# Patient Record
Sex: FEMALE | Race: WHITE | NOT HISPANIC OR LATINO | Employment: UNEMPLOYED | URBAN - METROPOLITAN AREA
[De-identification: names, ages, dates, MRNs, and addresses within clinical notes are randomized per-mention and may not be internally consistent; named-entity substitution may affect disease eponyms.]

---

## 2017-06-19 ENCOUNTER — TRANSFERRED RECORDS (OUTPATIENT)
Dept: HEALTH INFORMATION MANAGEMENT | Facility: CLINIC | Age: 52
End: 2017-06-19

## 2018-09-13 ENCOUNTER — TRANSFERRED RECORDS (OUTPATIENT)
Dept: HEALTH INFORMATION MANAGEMENT | Facility: CLINIC | Age: 53
End: 2018-09-13

## 2018-09-19 ENCOUNTER — TRANSFERRED RECORDS (OUTPATIENT)
Dept: HEALTH INFORMATION MANAGEMENT | Facility: CLINIC | Age: 53
End: 2018-09-19

## 2018-09-24 ENCOUNTER — TRANSFERRED RECORDS (OUTPATIENT)
Dept: HEALTH INFORMATION MANAGEMENT | Facility: CLINIC | Age: 53
End: 2018-09-24

## 2018-09-25 NOTE — TELEPHONE ENCOUNTER
Date of appointment: 18 @ 11:00AM   Diagnosis/reason for appointment: Invasive Ductal Carcinoma, Dane Grade 1, ER/IL+  Referring provider/facility: Self Referred  Who called: Patient / OK'd per ashok from Dr. Oviedo    Recent Studies  Imagin14 Mammo, 3/1/16 Mammo, 3/3/16 Dx & Stereo Mammo, 17 Mammo, 18 Mammo, 18  Breast @ [facility] Imaging Dept  Pathology: 18 - [facility]  Labs: 18 ER/IL, HER2 FISH    Records requested from: [facility] Pathology Dept  Records received from:

## 2018-09-26 NOTE — TELEPHONE ENCOUNTER
6 images pushed to PACS from [employer] and is now resolved/viewable per Marty @ [facility] imaging dept.

## 2018-09-26 NOTE — TELEPHONE ENCOUNTER
Med recs received from [facility] (23 pages) and sent to scanning via [employer] @ 4:14PM

## 2018-09-26 NOTE — TELEPHONE ENCOUNTER
Imaging reports received from [facility] (8 pages) and sent to scanning via [employer] @ 7:54AM

## 2018-09-27 NOTE — TELEPHONE ENCOUNTER
Path slides and report received from [facility] and sent to [facility] path dept.

## 2018-09-28 ENCOUNTER — ONCOLOGY VISIT (OUTPATIENT)
Dept: ONCOLOGY | Facility: CLINIC | Age: 53
End: 2018-09-28
Attending: SURGERY
Payer: COMMERCIAL

## 2018-09-28 ENCOUNTER — TRANSFERRED RECORDS (OUTPATIENT)
Dept: HEALTH INFORMATION MANAGEMENT | Facility: CLINIC | Age: 53
End: 2018-09-28

## 2018-09-28 ENCOUNTER — PRE VISIT (OUTPATIENT)
Dept: ONCOLOGY | Facility: CLINIC | Age: 53
End: 2018-09-28

## 2018-09-28 VITALS
OXYGEN SATURATION: 98 % | HEART RATE: 93 BPM | HEIGHT: 64 IN | BODY MASS INDEX: 29.19 KG/M2 | DIASTOLIC BLOOD PRESSURE: 80 MMHG | TEMPERATURE: 97.4 F | SYSTOLIC BLOOD PRESSURE: 120 MMHG | WEIGHT: 171 LBS | RESPIRATION RATE: 16 BRPM

## 2018-09-28 DIAGNOSIS — C50.411 MALIGNANT NEOPLASM OF UPPER-OUTER QUADRANT OF RIGHT BREAST IN FEMALE, ESTROGEN RECEPTOR POSITIVE (H): Primary | ICD-10-CM

## 2018-09-28 DIAGNOSIS — Z17.0 MALIGNANT NEOPLASM OF UPPER-OUTER QUADRANT OF RIGHT BREAST IN FEMALE, ESTROGEN RECEPTOR POSITIVE (H): Primary | ICD-10-CM

## 2018-09-28 RX ORDER — LEVOTHYROXINE SODIUM 100 UG/1
100 TABLET ORAL EVERY MORNING
COMMUNITY
Start: 2018-08-10 | End: 2022-10-24

## 2018-09-28 RX ORDER — FLUTICASONE PROPIONATE 50 MCG
SPRAY, SUSPENSION (ML) NASAL
COMMUNITY
Start: 2015-11-02

## 2018-09-28 RX ORDER — IBUPROFEN 200 MG
200 TABLET ORAL
COMMUNITY

## 2018-09-28 RX ORDER — ALBUTEROL SULFATE 90 UG/1
2-4 AEROSOL, METERED RESPIRATORY (INHALATION)
COMMUNITY
Start: 2017-04-07

## 2018-09-28 RX ORDER — DIPHENHYDRAMINE HCL 25 MG
TABLET ORAL
COMMUNITY
End: 2019-05-01

## 2018-09-28 RX ORDER — HYDROXYZINE HYDROCHLORIDE 10 MG/5ML
4 SYRUP ORAL
COMMUNITY

## 2018-09-28 ASSESSMENT — PAIN SCALES - GENERAL: PAINLEVEL: NO PAIN (0)

## 2018-09-28 NOTE — LETTER
2018      RE: Gabriella Villarreal  [address]  [place] [address]     Sep 28, 2018    MIKAL CHAPIN FREDRICK   [facility] [address]  [place] [facility] [id number]    RE: Gabriella Villarreal  (: 1965)    Dear Dr. Mikal CHAPIN Fredrick:    Your patient was seen for evaluation in my office.  Please find a copy of my notes for your record and review.  If you have any further questions, please feel free to contact my office.   Thank you for your kind referral.    Sincerely,   Kalina Oviedo MD MSc formerly [facility] FACS    ---     NEW CONSULTATION  Sep 28, 2018    Gabriella Villarreal is a 53 year old woman who presents with a right  breast complaint.  She was self-referred.    HPI:    She noted no masses in either breast, axilla, or neck. She denies any nipple discharge or nipple inversion.    Imaging showed a 1.2 x 1.2 x 0.8 cm mass at 9:00 in the RIGHT breast.    A biopsy was performed and a clip was placed.  It showed invasive ductal carcinoma, grade 2, ER+ WI+ HER2/sirena equivocal.      She has a bilateral breast MRI ordered that will take place this evening.    BREAST-SPECIFIC HISTORY:  Prior breast biopsies: Yes, left breast in 2016 - benign  Prior breast surgeries: No  Prior radiation history: No  Hormone replacement therapy: No  Bra size: 36D  Dominant hand: Left    GYN HISTORY:    Age at 1st pregnancy: 31  Age at menarche: 14  Breastfeeding history: Yes  Menopausal? Perimenopausal - currently on OCP; L oophorectomy for benign cyst ()    FAMILY HISTORY:  Breast ca: No  Ovarian ca: Yes, paternal cousin (dx 35), maternal aunt (dx 64)  Pancreatic ca: No  Gastric ca: No  Melanoma: No  Colon ca: No  Other cancer: Yes, MGF w lung ca (smoker)    Past Medical History:   Diagnosis Date     Chronic cough      Hypothyroidism        Past Surgical History:   Procedure Laterality Date     CHOLECYSTECTOMY       L salpingooophorectomy       No GA issues    Current Outpatient Prescriptions   Medication Sig Dispense Refill      "albuterol (PROAIR HFA/PROVENTIL HFA/VENTOLIN HFA) 108 (90 Base) MCG/ACT inhaler Inhale 2-4 puffs into the lungs       fluticasone (FLONASE) 50 MCG/ACT spray INSTILL 2 SPRAYS INTO BOTH NOSTRILS DAILY.       chlorpheniramine (CHLOR-TRIMETON) 4 MG tablet        diphenhydrAMINE (BENADRYL) 25 MG tablet        ibuprofen (ADVIL/MOTRIN) 200 MG tablet Take 200 mg by mouth       levothyroxine (SYNTHROID/LEVOTHROID) 100 MCG tablet            No Known Allergies     SOCIAL HISTORY:  Smokes: No  EtOH: Yes, 2 drinks per week  Illicit drugs: No    She works as a  for [employer].    ROS:  Back pain: No  Headache: Yes - mild attributes to stress, not nocturnal, spontaneously resolves  Abdominal pain: No  Unexpected weight loss: No  Easy bruising/bleeding: No    /80 (BP Location: Right arm, Patient Position: Chair, Cuff Size: Adult Large)  Pulse 93  Temp 97.4  F (36.3  C) (Oral)  Resp 16  Ht 1.626 m (5' 4\")  Wt 77.6 kg (171 lb)  SpO2 98%  BMI 29.35 kg/m2   Physical Exam   Constitutional: She is well-developed, well-nourished, and in no distress.   Pulmonary/Chest: Effort normal. No respiratory distress. Right breast exhibits no inverted nipple, no mass, no nipple discharge, no skin change and no tenderness. Left breast exhibits no inverted nipple, no mass, no nipple discharge, no skin change and no tenderness.       Patient was examined in both upright and supine positions.   Lymphadenopathy:     She has no cervical adenopathy.        Right cervical: No superficial cervical, no deep cervical and no posterior cervical adenopathy present.       Left cervical: No superficial cervical, no deep cervical and no posterior cervical adenopathy present.     She has no axillary adenopathy.        Right axillary: No pectoral and no lateral adenopathy present.        Left axillary: No pectoral and no lateral adenopathy present.       Right: No supraclavicular adenopathy present.        Left: No supraclavicular " adenopathy present.   No lymphedema in bilateral upper extremities.    Skin: Skin is warm and dry.        INVESTIGATIONS:    Diagnostic Mammogram & Ultrasound from [facility] (9/19/2018) showed:  Right full-field digital diagnostic mammogram performed. There are scattered areas of fibroglandular density.  Additional mammographic views including spot compression in the craniocaudal and mediolateral oblique projections were obtained. The additional images confirm presence of a spiculated density at the 9:00 position middle depth with associated architectural distortion.    Targeted right breast ultrasound was performed by the ultrasonographer and by the radiologist.  In the right breat at the 9:00 position, there is an irregularly marginated hypoechoic solid area 1.2 x 1.2 x 0.8 cm.  This corresponds to the density on mammogram and is suspicious for malignancy. No lymphadenopathy is seen.  BI-RADS 4    Screening Mammogram (9/13/2018) showed:  FINDINGS:  The breasts have scattered areas of fibroglandular density. There is an asymmetry in the right breast at the 9:00 position at middle depth. There are benign appearing calcifications.  BI-RADS 0    I personally reviewed her images with our in-house breast radiologist today. The spiculated mass measures slightly larger than the mass seen on ultrasound. In addition, on the ultrasound images, there appears to be additional hypoechoic areas immediately adjacent to the cancer, totaling an area of 3.2 cm.    Biopsy from [facility] (9/19/2018) showed:  Breast, right 9:00, zone 2, needle core biopsy  Invasive ductal carcionma, grade 1  ER positive (>75%)  RI positive (>75%)  HER2/sirena equivocal (2+ on IHC)    ASSESSMENT:  Gabriella Villarreal is a 53 year old woman with right breast cancer.    I reviewed the imaging, diagnosis, staging, and management of breast cancer with Gabriella Villarreal and her , Raghu.      It is not entirely clear the T stage of her tumor given  "the imaging review performed today.  We discussed that she has a Stage I breast cancer; the MRI tonight will further determine the size of the primary tumor (1.2 cm vs 3 cm).  We discussed that if the tumor was >2.5 cm, then she would be eligible for the I-SPY2 clinical trial here.    The cancer was HER2/sirena equivocal on IHC; the FISH has been ordered at the outside facility and the result is pending.  We reviewed that if she had a HER2/sirena amplified breast cancer, then she would be a candidate for systemic therapy (chemotherapy + targeted therapy). We discussed the advantages of neoadjuvant systemic therapy in this setting.  Gabriella Villarreal will phone her local facility to follow up on her HER2/sirena result.  If the cancer is HER2/sirena non-amplified, then the decision regarding adjuvant chemotherapy will be made after surgery.    The mainstay of treatment for resectable breast cancer is surgical resection, in the form of either breast conservation (segmental mastectomy plus radiation) or mastectomy.  We reviewed that the two strategies are equivalent in terms of overall survival.  The advantages and disadvantages of each were discussed.   Gabriella Villarreal IS a candidate for breast conservation therapy.  We discussed that this involves two necessary components: the lumpectomy (or \"segmental mastectomy\"), and 6 weeks of whole breast radiation therapy.  We discussed that the overall survival after breast conservation therapy is identical to mastectomy and that local recurrence rates are significantly higher if segmental mastectomy was performed without subsequent radiation.  We also discussed the significance of clear margins and that a subsequent procedure may be necessary to achieve this.    Because the lesion is not palpable, a wire-localized approach would be taken for breast conservation.  She would present on the day of surgery for an image-guided wire placement, followed by a surgical excision in the operating " room.  The risks of a wire-localized segmental mastectomy were discussed with the patient and family, including the risks of bleeding, wound infection, wound dehiscence, and post-operative contour change to the breast.      Alternatively, we also discussed the various types of mastectomy, including total, skin-sparing, and nipple-sparing mastectomy.  We reviewed that the nipple-sparing technique is cosmetic; sensation and contractility will likely be lost.  Gabriella Villarreal is not an ideal candidate for nipple-sparing mastectomy due to relative large size of her breasts.  The risks of a mastectomy were discussed with the patient and family, including the risks of bleeding, wound infection, wound dehiscence, skin flap/nipple necrosis, and seroma formation.    The option of having immediate versus delayed reconstruction was also discussed.   We reviewed that the advantages of immediate reconstruction includes superior cosmetics, as the skin is preserved.  However, the major disadvantage is increased postoperative risks, including skin flap ischemia and expander infection, which can potentially delay adjuvant oncologic treatments which may be needed post-surgically. Gabriella Villarreal is leaning towards breast conservation.      In addition to the surgical management of the breast, a sentinel lymph node biopsy is recommended for ryan staging of the axilla.  This is performed with the combination of the radioactive colloid and lymphazurin. The risks of a sentinel lymph node biopsy were discussed with the patient and family, including the risks of lymphedema (5-10%), bleeding, wound infection, wound dehiscence, seroma formation, and paresthesias. There is also a small risk of anaphylaxis with lymphazurin injection as part of the procedure. There is an approximately 10% false negative rate associated with sentinel lymph node biopsy as published in the literature.  The findings of the sentinel lymph node biopsy may result  in the need for further surgery (i.e. Axillary lymph node dissection) or radiation. There is a 5-10% chance of patients whose sentinel lymph nodes do not map despite dual tracer (radiocolloid and lymphazurin). Should this be the case, we discussed that I would proceed with an axillary lymph node dissection at the index procedure.  The higher risks of an axillary lymph node dissection were also reviewed, including lymphedema (20-30%), bleeding, wound infection, wound dehiscence, seroma formation, nerve injury, limited arm range of motion and paresthesias. We discussed that a drain would be placed intra-operatively should an axillary lymph node dissection be performed.     In addition, she has a significant family history of ovarian cancer.  I have recommended genetic counseling +/- testing.  The natural history of BRCA mutations and breast cancer were discussed with the patient. Should a deleterious mutation be identified, she would no longer be a good candidate for breast conservation.  We also reviewed the risk reduction benefits of a prophylactic mastectomy in this situation.     Finally, we discussed that she has an ER-positive breast cancer, and oral contraceptive pills are contraindicated. She understands and will stop taking the OCP.    All of the above was discussed with Gabriella Villarreal and all questions were answered.  She elected to proceed with genetic counseling +/- testing.  She currently prefers breast conservation, but will likely opt for bilateral mastectomy if her genetic testing demonstrates a deleterious BRCA mutation.    Total time spent with the patient was 90 minutes, of which more than half was counseling.     PLAN:  1. Breast MRI tonight to clarify T stage  2. If HER2 amplified, neoadjuvant systemic therapy  3. If cancer >2.5 cm and HER2 non-amplified, referral to medical oncology for possible I-SPY2  4. Genetic counseling +/- testing  5. Patient currently prefers breast conservation (RIGHT  wire-localized segmental mastectomy, RIGHT axillary sentinel lymph node biopsy, possible axillary lymph node dissection)  6. Stop oral contraceptive pills    Kalina Oviedo MD MSc FRCSC FACS    Division of Surgical Oncology  [facility]

## 2018-09-28 NOTE — Clinical Note
2018       RE: Gabriella Villarreal  [address] [facility] [address]     Dear Colleague,    Thank you for referring your patient, Gabriella Villarreal, to the [facility] at [facility]. Please see a copy of my visit note below.    NEW CONSULTATION  Sep 28, 2018    Gabriella Villarreal is a 53 year old woman who presents with a right  breast complaint.  She was self-referred.    HPI:    She noted no masses in either breast, axilla, or neck. She denies any nipple discharge or nipple inversion.    Imaging showed a 1.2 x 1.2 x 0.8 cm mass at 9:00 in the RIGHT breast.    A biopsy was performed and a clip was placed.  It showed invasive ductal carcinoma, grade 2, ER+ CT+ HER2/sirena equivocal.      She has a bilateral breast MRI ordered that will take place this evening.    BREAST-SPECIFIC HISTORY:  Prior breast biopsies: Yes, left breast in 2016 - benign  Prior breast surgeries: No  Prior radiation history: No  Hormone replacement therapy: No  Bra size: 36D  Dominant hand: Left    GYN HISTORY:    Age at 1st pregnancy: 31  Age at menarche: 14  Breastfeeding history: Yes  Menopausal? Perimenopausal - currently on OCP; L oophorectomy for benign cyst ()    FAMILY HISTORY:  Breast ca: No  Ovarian ca: Yes, paternal cousin (dx 35), maternal aunt (dx 64)  Pancreatic ca: No  Gastric ca: No  Melanoma: No  Colon ca: No  Other cancer: Yes, MGF w lung ca (smoker)    Past Medical History:   Diagnosis Date     Chronic cough      Hypothyroidism        Past Surgical History:   Procedure Laterality Date     CHOLECYSTECTOMY       L salpingooophorectomy       No GA issues    Current Outpatient Prescriptions   Medication Sig Dispense Refill     albuterol (PROAIR HFA/PROVENTIL HFA/VENTOLIN HFA) 108 (90 Base) MCG/ACT inhaler Inhale 2-4 puffs into the lungs       fluticasone (FLONASE) 50 MCG/ACT spray INSTILL 2 SPRAYS INTO BOTH NOSTRILS DAILY.       chlorpheniramine  "(CHLOR-TRIMETON) 4 MG tablet        diphenhydrAMINE (BENADRYL) 25 MG tablet        ibuprofen (ADVIL/MOTRIN) 200 MG tablet Take 200 mg by mouth       levothyroxine (SYNTHROID/LEVOTHROID) 100 MCG tablet            No Known Allergies     SOCIAL HISTORY:  Smokes: No  EtOH: Yes, 2 drinks per week  Illicit drugs: No    She works as a  for [employer].    ROS:  Back pain: No  Headache: Yes - mild attributes to stress, not nocturnal, spontaneously resolves  Abdominal pain: No  Unexpected weight loss: No  Easy bruising/bleeding: No    /80 (BP Location: Right arm, Patient Position: Chair, Cuff Size: Adult Large)  Pulse 93  Temp 97.4  F (36.3  C) (Oral)  Resp 16  Ht 1.626 m (5' 4\")  Wt 77.6 kg (171 lb)  SpO2 98%  BMI 29.35 kg/m2   Physical Exam   Constitutional: She is well-developed, well-nourished, and in no distress.   Pulmonary/Chest: Effort normal. No respiratory distress. Right breast exhibits no inverted nipple, no mass, no nipple discharge, no skin change and no tenderness. Left breast exhibits no inverted nipple, no mass, no nipple discharge, no skin change and no tenderness.       Patient was examined in both upright and supine positions.   Lymphadenopathy:     She has no cervical adenopathy.        Right cervical: No superficial cervical, no deep cervical and no posterior cervical adenopathy present.       Left cervical: No superficial cervical, no deep cervical and no posterior cervical adenopathy present.     She has no axillary adenopathy.        Right axillary: No pectoral and no lateral adenopathy present.        Left axillary: No pectoral and no lateral adenopathy present.       Right: No supraclavicular adenopathy present.        Left: No supraclavicular adenopathy present.   No lymphedema in bilateral upper extremities.    Skin: Skin is warm and dry.        INVESTIGATIONS:    Diagnostic Mammogram & Ultrasound from [facility] (9/19/2018) showed:  Right full-field digital " diagnostic mammogram performed. There are scattered areas of fibroglandular density.  Additional mammographic views including spot compression in the craniocaudal and mediolateral oblique projections were obtained. The additional images confirm presence of a spiculated density at the 9:00 position middle depth with associated architectural distortion.    Targeted right breast ultrasound was performed by the ultrasonographer and by the radiologist.  In the right breat at the 9:00 position, there is an irregularly marginated hypoechoic solid area 1.2 x 1.2 x 0.8 cm.  This corresponds to the density on mammogram and is suspicious for malignancy. No lymphadenopathy is seen.  BI-RADS 4    Screening Mammogram (9/13/2018) showed:  FINDINGS:  The breasts have scattered areas of fibroglandular density. There is an asymmetry in the right breast at the 9:00 position at middle depth. There are benign appearing calcifications.  BI-RADS 0    I personally reviewed her images with our in-house breast radiologist today. The spiculated mass measures slightly larger than the mass seen on ultrasound. In addition, on the ultrasound images, there appears to be additional hypoechoic areas immediately adjacent to the cancer, totaling an area of 3.2 cm.    Biopsy from [facility] (9/19/2018) showed:  Breast, right 9:00, zone 2, needle core biopsy  Invasive ductal carcionma, grade 1  ER positive (>75%)  MN positive (>75%)  HER2/sirena equivocal (2+ on IHC)    ASSESSMENT:  Gabriella Villarreal is a 53 year old woman with right breast cancer.    I reviewed the imaging, diagnosis, staging, and management of breast cancer with Gabriella Villarreal and her , Raghu.      It is not entirely clear the T stage of her tumor given the imaging review performed today.  We discussed that she has a Stage I breast cancer; the MRI tonight will further determine the size of the primary tumor (1.2 cm vs 3 cm).  We discussed that if the tumor was >2.5 cm, then  "she would be eligible for the I-SPY2 clinical trial here.    The cancer was HER2/sirena equivocal on IHC; the FISH has been ordered at the outside facility and the result is pending.  We reviewed that if she had a HER2/sirena amplified breast cancer, then she would be a candidate for systemic therapy (chemotherapy + targeted therapy). We discussed the advantages of neoadjuvant systemic therapy in this setting.  Gabriella Villarreal will phone her local facility to follow up on her HER2/sirena result.  If the cancer is HER2/sirena non-amplified, then the decision regarding adjuvant chemotherapy will be made after surgery.    The mainstay of treatment for resectable breast cancer is surgical resection, in the form of either breast conservation (segmental mastectomy plus radiation) or mastectomy.  We reviewed that the two strategies are equivalent in terms of overall survival.  The advantages and disadvantages of each were discussed.   Gabriella Villarreal IS a candidate for breast conservation therapy.  We discussed that this involves two necessary components: the lumpectomy (or \"segmental mastectomy\"), and 6 weeks of whole breast radiation therapy.  We discussed that the overall survival after breast conservation therapy is identical to mastectomy and that local recurrence rates are significantly higher if segmental mastectomy was performed without subsequent radiation.  We also discussed the significance of clear margins and that a subsequent procedure may be necessary to achieve this.    Because the lesion is not palpable, a wire-localized approach would be taken for breast conservation.  She would present on the day of surgery for an image-guided wire placement, followed by a surgical excision in the operating room.  The risks of a wire-localized segmental mastectomy were discussed with the patient and family, including the risks of bleeding, wound infection, wound dehiscence, and post-operative contour change to the breast.  "     Alternatively, we also discussed the various types of mastectomy, including total, skin-sparing, and nipple-sparing mastectomy.  We reviewed that the nipple-sparing technique is cosmetic; sensation and contractility will likely be lost.  Gabriella Villarreal is not an ideal candidate for nipple-sparing mastectomy due to relative large size of her breasts.  The risks of a mastectomy were discussed with the patient and family, including the risks of bleeding, wound infection, wound dehiscence, skin flap/nipple necrosis, and seroma formation.    The option of having immediate versus delayed reconstruction was also discussed.   We reviewed that the advantages of immediate reconstruction includes superior cosmetics, as the skin is preserved.  However, the major disadvantage is increased postoperative risks, including skin flap ischemia and expander infection, which can potentially delay adjuvant oncologic treatments which may be needed post-surgically. Gabriella Villarreal is leaning towards breast conservation.      In addition to the surgical management of the breast, a sentinel lymph node biopsy is recommended for ryan staging of the axilla.  This is performed with the combination of the radioactive colloid and lymphazurin. The risks of a sentinel lymph node biopsy were discussed with the patient and family, including the risks of lymphedema (5-10%), bleeding, wound infection, wound dehiscence, seroma formation, and paresthesias. There is also a small risk of anaphylaxis with lymphazurin injection as part of the procedure. There is an approximately 10% false negative rate associated with sentinel lymph node biopsy as published in the literature.  The findings of the sentinel lymph node biopsy may result in the need for further surgery (i.e. Axillary lymph node dissection) or radiation. There is a 5-10% chance of patients whose sentinel lymph nodes do not map despite dual tracer (radiocolloid and lymphazurin). Should this  be the case, we discussed that I would proceed with an axillary lymph node dissection at the index procedure.  The higher risks of an axillary lymph node dissection were also reviewed, including lymphedema (20-30%), bleeding, wound infection, wound dehiscence, seroma formation, nerve injury, limited arm range of motion and paresthesias. We discussed that a drain would be placed intra-operatively should an axillary lymph node dissection be performed.     In addition, she has a significant family history of ovarian cancer.  I have recommended genetic counseling +/- testing.  The natural history of BRCA mutations and breast cancer were discussed with the patient. Should a deleterious mutation be identified, she would no longer be a good candidate for breast conservation.  We also reviewed the risk reduction benefits of a prophylactic mastectomy in this situation.     Finally, we discussed that she has an ER-positive breast cancer, and oral contraceptive pills are contraindicated. She understands and will stop taking the OCP.    All of the above was discussed with Gabriella Villarreal and all questions were answered.  She elected to proceed with genetic counseling +/- testing.  She currently prefers breast conservation, but will likely opt for bilateral mastectomy if her genetic testing demonstrates a deleterious BRCA mutation.    Total time spent with the patient was 90 minutes, of which more than half was counseling.     PLAN:  1. Breast MRI tonight to clarify T stage  2. If HER2 amplified, neoadjuvant systemic therapy  3. If cancer >2.5 cm and HER2 non-amplified, referral to medical oncology for possible I-SPY2  4. Genetic counseling +/- testing  5. Patient currently prefers breast conservation (RIGHT wire-localized segmental mastectomy, RIGHT axillary sentinel lymph node biopsy, possible axillary lymph node dissection)  6. Stop oral contraceptive pills    Kalina Oviedo MD MSc [facility] FACS    Division of  Surgical Oncology  [facility]     Again, thank you for allowing me to participate in the care of your patient.      Sincerely,    Kalina Oviedo MD

## 2018-09-28 NOTE — NURSING NOTE
"Oncology Rooming Note    September 28, 2018 10:59 AM   Gabriella Villarreal is a 53 year old female who presents for:    Chief Complaint   Patient presents with     Oncology Clinic Visit     New - breast ca      Initial Vitals: /80 (BP Location: Right arm, Patient Position: Chair, Cuff Size: Adult Large)  Pulse 93  Temp 97.4  F (36.3  C) (Oral)  Resp 16  Ht 1.626 m (5' 4\")  Wt 77.6 kg (171 lb)  SpO2 98%  BMI 29.35 kg/m2 Estimated body mass index is 29.35 kg/(m^2) as calculated from the following:    Height as of this encounter: 1.626 m (5' 4\").    Weight as of this encounter: 77.6 kg (171 lb). Body surface area is 1.87 meters squared.  No Pain (0) Comment: Data Unavailable   No LMP recorded.  Allergies reviewed: Yes  Medications reviewed: Yes    Medications: no refills   Pharmacy name entered into EPIC: Data Unavailable    Clinical concerns: no new concerns      6 minutes for nursing intake (face to face time)     Pallavi Rodríguez CMA            "

## 2018-10-01 PROCEDURE — 00000346 ZZHCL STATISTIC REVIEW OUTSIDE SLIDES TC 88321: Performed by: SURGERY

## 2018-10-03 ENCOUNTER — HEALTH MAINTENANCE LETTER (OUTPATIENT)
Age: 53
End: 2018-10-03

## 2018-10-04 ENCOUNTER — TELEPHONE (OUTPATIENT)
Dept: ONCOLOGY | Facility: CLINIC | Age: 53
End: 2018-10-04

## 2018-10-04 DIAGNOSIS — C50.411 MALIGNANT NEOPLASM OF UPPER-OUTER QUADRANT OF RIGHT BREAST IN FEMALE, ESTROGEN RECEPTOR POSITIVE (H): ICD-10-CM

## 2018-10-04 DIAGNOSIS — Z17.0 MALIGNANT NEOPLASM OF UPPER-OUTER QUADRANT OF RIGHT BREAST IN FEMALE, ESTROGEN RECEPTOR POSITIVE (H): ICD-10-CM

## 2018-10-04 DIAGNOSIS — C50.911 MALIGNANT NEOPLASM OF RIGHT BREAST (H): Primary | ICD-10-CM

## 2018-10-04 RX ORDER — CEFAZOLIN SODIUM 2 G/50ML
2 SOLUTION INTRAVENOUS
Status: CANCELLED | OUTPATIENT
Start: 2018-10-04

## 2018-10-04 RX ORDER — CEFAZOLIN SODIUM 1 G/50ML
1 INJECTION, SOLUTION INTRAVENOUS SEE ADMIN INSTRUCTIONS
Status: CANCELLED | OUTPATIENT
Start: 2018-10-04

## 2018-10-04 RX ORDER — ACETAMINOPHEN 325 MG/1
975 TABLET ORAL ONCE
Status: CANCELLED | OUTPATIENT
Start: 2018-10-04 | End: 2018-10-04

## 2018-10-04 NOTE — TELEPHONE ENCOUNTER
Late entry note.  Spoke with patient this morning at her request.    Reviewed her breast MRI finding with her. Specifically, there is a second, slightly anterior and inferior mass measuring 1.5 cm that is distinct from the primary tumor.  Also reviewed that she is HER2 negative.  Both of these findings suggest that we should proceed with surgery first (rather than systemic therapy).  She expressed preference for breast conservation, as long as her genetic testing is negative.   She is meeting with genetics tomorrow. We will plan for breast conservation surgery just after 2 weeks from tomorrow, to allow for the results to come back.  If she tests positive for BRCA 1 or 2, then she would like to proceed with bilateral mastectomy.    All questions were answered and she was appreciative of the call.    Kalina Oviedo MD MSc [facility] FACS    Division of Surgical Oncology  [facility]

## 2018-10-05 ENCOUNTER — OFFICE VISIT (OUTPATIENT)
Dept: ONCOLOGY | Facility: CLINIC | Age: 53
End: 2018-10-05
Attending: GENETIC COUNSELOR, MS
Payer: COMMERCIAL

## 2018-10-05 DIAGNOSIS — Z17.0 MALIGNANT NEOPLASM OF UPPER-OUTER QUADRANT OF RIGHT BREAST IN FEMALE, ESTROGEN RECEPTOR POSITIVE (H): Primary | ICD-10-CM

## 2018-10-05 DIAGNOSIS — Z80.42 FAMILY HISTORY OF PROSTATE CANCER: ICD-10-CM

## 2018-10-05 DIAGNOSIS — C50.411 MALIGNANT NEOPLASM OF UPPER-OUTER QUADRANT OF RIGHT BREAST IN FEMALE, ESTROGEN RECEPTOR POSITIVE (H): ICD-10-CM

## 2018-10-05 DIAGNOSIS — C50.411 MALIGNANT NEOPLASM OF UPPER-OUTER QUADRANT OF RIGHT BREAST IN FEMALE, ESTROGEN RECEPTOR POSITIVE (H): Primary | ICD-10-CM

## 2018-10-05 DIAGNOSIS — Z80.41 FAMILY HISTORY OF MALIGNANT NEOPLASM OF OVARY: ICD-10-CM

## 2018-10-05 DIAGNOSIS — Z17.0 MALIGNANT NEOPLASM OF UPPER-OUTER QUADRANT OF RIGHT BREAST IN FEMALE, ESTROGEN RECEPTOR POSITIVE (H): ICD-10-CM

## 2018-10-05 LAB — MISCELLANEOUS TEST: NORMAL

## 2018-10-05 PROCEDURE — 36415 COLL VENOUS BLD VENIPUNCTURE: CPT

## 2018-10-05 PROCEDURE — 96040 ZZH GENETIC COUNSELING, EACH 30 MINUTES: CPT | Mod: ZF | Performed by: GENETIC COUNSELOR, MS

## 2018-10-05 NOTE — PROGRESS NOTES
10/5/2018    Referring Provider: Kalina Oviedo MD    Presenting Information:   I met with Gabriella Villarreal today for genetic counseling at the Cancer Risk Management Program at the [facility] to discuss her recent breast cancer diagnosis and family history of ovarian cancer.  She is here today to review this history and available genetic testing options.    Personal History:  Gabriella is a 53 year old female.  She was diagnosed with invasive ductal carcinoma of her right breast at age 53 (ER/AR positive and Her2 negative); breast conservation surgery is planned in two weeks, however the results from genetic testing will help with surgical decision making (such as bilateral mastectomy).      Gabriella has a history of left salpingo-oophorectomy due to a benign cyst, and has her right ovary, fallopian tube as well as uterus in place.  She reports being perimenopause.  She reports one prior breast biopsy which was benign.   She does not regularly do any other cancer screening at this time.  Gabriella reported no tobacco use, and no concerns regarding alcohol use or environmental exposures.    Family History: (Please see scanned pedigree for detailed family history information)    One maternal aunt was diagnosed with ovarian cancer in her 60's and passed away at age 65    Her father was diagnosed with prostate cancer in his 70's and is now 75    One paternal uncle had a history of either anal or rectal cancer at age 61 and passed away at age 65    One paternal first cousin was diagnosed with ovarian cancer at age 30 and passed away at 35.  She reportedly completed genetic testing approximately 8 years ago which was negative for the BRCA1/2 genes.     Her maternal ethnicity is [place]. Her paternal ethnicity is Polish/Tongan.  There is no known Ashkenazi Mormon ancestry on either side of her family.     Discussion:    Gabriella's recent breast cancer diagnosis and family history of ovarian cancer is suggestive of a  hereditary cancer syndrome.    We reviewed the features of sporadic, familial, and hereditary cancers.  Based on her personal and family history, Gabriella meets current National Comprehensive Cancer Network (NCCN) criteria for genetic testing of BRCA1/2.       We discussed the natural history and genetics of Hereditary Breast and Ovarian Cancer syndrome caused by mutations in the BRCA1/2 genes.  Due to the family history of ovarian cancer, we also discussed the natural history and genetics of Aleman syndrome due to mismatch repair gene mutations (MLH1, MSH2, MSH6, PMS2, EPCAM). We discussed that there are additional genes that could cause increased risk for breast and ovarian cancer.  As many of these genes present with overlapping features in a family and accurate cancer risk cannot always be established based upon the pedigree analysis alone, it would be reasonable for Gabriella to consider panel genetic testing to analyze multiple genes at once.    A detailed handout regarding hereditary breast and gynecologic cancers and the information we discussed was provided to Gabriella at the end of our appointment today and can be found in the after visit summary.  Topics included: inheritance pattern, cancer risks, cancer screening recommendations, and also risks, benefits and limitations of testing.    We reviewed genetic testing options for hereditary breast, gynecologic and cancers: actionable high/moderate risk breast and gynecologic cancer risk custom panel (CustomNext-Cancer, 19 genes, a combination of GynPlus and BRCAplus + NBN, STK11, and NF1), expanded high and moderate risk breast and gynecologic panel testing (OvaNext, 25 genes), and expanded genetic testing for hereditary breast, gynecologic, prostate and gastrointestinal cancers (CancerNext, 34 genes).  Gabriella expressed an interest in learning as much information as possible from the testing. She opted for the CancerNext panel.   Genetic testing is available  for 34 genes associated with hereditary cancer: CancerNext (APC, MERRILL, BARD1, BRCA1, BRCA2, BRIP1, BMPR1A, CDH1, CDK4, CDKN2A, CHEK2, DICER1, EPCAM, GREM1, HOXB13, MLH1, MRE11A, MSH2, MSH6, MUTYH, NBN, NF1, PALB2, PMS2, POLD1, POLE, PTEN, RAD50, RAD51C, RAD51D, SMAD4, SMARCA4, STK11, and TP53).  We discussed that many of the genes in the CancerNext panel have known risks and published management guidelines.  Some genes are associated with specific hereditary cancer syndromes: Hereditary Breast and Ovarian Cancer syndrome (BRCA1, BRCA2), Aleman syndrome (MLH1, MSH2, MSH6, PMS2, EPCAM), Familial Adenomatous Polyposis syndrome (APC), Hereditary Gastric Cancer syndrome (CDH1), Familial Atypical Multiple Mole Melanoma syndrome (CDK4, CDKN2A), Juvenile Polyposis syndrome (BMPR1A, SMAD4), Cowden syndrome (PTEN), Li Fraumeni syndrome (TP53), Peutz-Jeghers syndrome (STK11), MUTYH Associated Polyposis syndrome (MUTYH), and Neurofibromatosis type 1 (NF1).   The MERRILL, BRIP1, CHEK2, GREM1, NBN , PALB2, POLD1, POLE, RAD51C, and RAD51D genes are associated with increased cancer risk and have published management guidelines for certain cancers.    The remaining genes (BARD1, DICER1, HOXB13, MRE11A, RAD50, and SMARCA4) are associated with increased cancer risk and may allow us to make medical recommendations when mutations are identified.    Consent was obtained and genetic testing for CancerNext was sent to [facility]. Results for the following 8 genes (MERRILL, BRCA1, BRCA2, CDH1, CHEK2, PALB2, PTEN, TP53) will be reported first within 1-2 weeks for treatment decision making.  Results for the remaining genes will be available in 3-4 weeks.    Gabriella was provided with a CancerNext handout, which lists the included genes and associated cancer risks, from [facility].    Medical Management: For Gabriella, we reviewed that the information from genetic testing may determine:    surgery to treat Gabriella's active cancer  diagnosis (i.e. lumpectomy versus bilateral mastectomy, ),    additional cancer screening for which Gabriella may qualify (i.e. mammogram and breast MRI, more frequent colonoscopies, more frequent dermatologic exams, etc.),    options for risk reducing surgeries Gabriella could consider (i.e. bilateral mastectomy, surgery to remove the ovaries and/or uterus, etc.),      And possible targeted chemotherapies for Gabriella's active cancer if indicated, or if she were to develop certain cancers in the future (i.e. immunotherapy for individuals with Aleman syndrome, PARP inhibitors, etc.).     These recommendations and possible targeted chemotherapies will be discussed in detail once genetic testing is completed.     Plan:  1) Today Gabriella elected to proceed with the CancerNext gene panel offered through [facility].  2) This information should be available in 3-4 weeks.  Results for the following 8 genes (MERRILL, BRCA1, BRCA2, CDH1, CHEK2, PALB2, PTEN, and TP53) will be reported within 1-2 weeks for surgical decision making.   3)  I will call Gabriella to discuss the results of her testing once completed      Face to face time: 45 minutes    Rhea Zarate MS, [facility]  Licensed Genetic Counselor  [phone]

## 2018-10-05 NOTE — LETTER
10/5/2018       RE: Gabriella Villarreal  [address] [facility] [place] [address]     Dear Colleague,    Thank you for referring your patient, Gabriella Villarreal, to the [facility]. Please see a copy of my visit note below.    10/5/2018    Referring Provider: Kalina Oviedo MD    Presenting Information:   I met with Gabriella Villarreal today for genetic counseling at the Cancer Risk Management Program at the [facility] to discuss her recent breast cancer diagnosis and family history of ovarian cancer.  She is here today to review this history and available genetic testing options.    Personal History:  Gabriella is a 53 year old female.  She was diagnosed with invasive ductal carcinoma of her right breast at age 53 (ER/SD positive and Her2 negative); breast conservation surgery is planned in two weeks, however the results from genetic testing will help with surgical decision making (such as bilateral mastectomy).      Gabriella has a history of left salpingo-oophorectomy due to a benign cyst, and has her right ovary, fallopian tube as well as uterus in place.  She reports being perimenopause.  She reports one prior breast biopsy which was benign.   She does not regularly do any other cancer screening at this time.  Gabriella reported no tobacco use, and no concerns regarding alcohol use or environmental exposures.    Family History: (Please see scanned pedigree for detailed family history information)    One maternal aunt was diagnosed with ovarian cancer in her 60's and passed away at age 65    Her father was diagnosed with prostate cancer in his 70's and is now 75    One paternal uncle had a history of either anal or rectal cancer at age 61 and passed away at age 65    One paternal first cousin was diagnosed with ovarian cancer at age 30 and passed away at 35.  She reportedly completed genetic testing approximately 8 years ago which was negative for the BRCA1/2 genes.     Her maternal ethnicity  is [place]. Her paternal ethnicity is [place].  There is no known Ashkenazi Islam ancestry on either side of her family.     Discussion:    Gabriella's recent breast cancer diagnosis and family history of ovarian cancer is suggestive of a hereditary cancer syndrome.    We reviewed the features of sporadic, familial, and hereditary cancers.  Based on her personal and family history, Gabriella meets current National Comprehensive Cancer Network (NCCN) criteria for genetic testing of BRCA1/2.       We discussed the natural history and genetics of Hereditary Breast and Ovarian Cancer syndrome caused by mutations in the BRCA1/2 genes.  Due to the family history of ovarian cancer, we also discussed the natural history and genetics of Aleman syndrome due to mismatch repair gene mutations (MLH1, MSH2, MSH6, PMS2, EPCAM). We discussed that there are additional genes that could cause increased risk for breast and ovarian cancer.  As many of these genes present with overlapping features in a family and accurate cancer risk cannot always be established based upon the pedigree analysis alone, it would be reasonable for Gabriella to consider panel genetic testing to analyze multiple genes at once.    A detailed handout regarding hereditary breast and gynecologic cancers and the information we discussed was provided to Gabriella at the end of our appointment today and can be found in the after visit summary.  Topics included: inheritance pattern, cancer risks, cancer screening recommendations, and also risks, benefits and limitations of testing.    We reviewed genetic testing options for hereditary breast, gynecologic and cancers: actionable high/moderate risk breast and gynecologic cancer risk custom panel (CustomNext-Cancer, 19 genes, a combination of GynPlus and BRCAplus + NBN, STK11, and NF1), expanded high and moderate risk breast and gynecologic panel testing (OvaNext, 25 genes), and expanded genetic testing for hereditary  breast, gynecologic, prostate and gastrointestinal cancers (CancerNext, 34 genes).  Gabriella expressed an interest in learning as much information as possible from the testing. She opted for the CancerNext panel.   Genetic testing is available for 34 genes associated with hereditary cancer: CancerNext (APC, MERRILL, BARD1, BRCA1, BRCA2, BRIP1, BMPR1A, CDH1, CDK4, CDKN2A, CHEK2, DICER1, EPCAM, GREM1, HOXB13, MLH1, MRE11A, MSH2, MSH6, MUTYH, NBN, NF1, PALB2, PMS2, POLD1, POLE, PTEN, RAD50, RAD51C, RAD51D, SMAD4, SMARCA4, STK11, and TP53).  We discussed that many of the genes in the CancerNext panel have known risks and published management guidelines.  Some genes are associated with specific hereditary cancer syndromes: Hereditary Breast and Ovarian Cancer syndrome (BRCA1, BRCA2), Aleman syndrome (MLH1, MSH2, MSH6, PMS2, EPCAM), Familial Adenomatous Polyposis syndrome (APC), Hereditary Gastric Cancer syndrome (CDH1), Familial Atypical Multiple Mole Melanoma syndrome (CDK4, CDKN2A), Juvenile Polyposis syndrome (BMPR1A, SMAD4), Cowden syndrome (PTEN), Li Fraumeni syndrome (TP53), Peutz-Jeghers syndrome (STK11), MUTYH Associated Polyposis syndrome (MUTYH), and Neurofibromatosis type 1 (NF1).   The MERRILL, BRIP1, CHEK2, GREM1, NBN , PALB2, POLD1, POLE, RAD51C, and RAD51D genes are associated with increased cancer risk and have published management guidelines for certain cancers.    The remaining genes (BARD1, DICER1, HOXB13, MRE11A, RAD50, and SMARCA4) are associated with increased cancer risk and may allow us to make medical recommendations when mutations are identified.    Consent was obtained and genetic testing for CancerNext was sent to [facility]. Results for the following 8 genes (MERRILL, BRCA1, BRCA2, CDH1, CHEK2, PALB2, PTEN, TP53) will be reported first within 1-2 weeks for treatment decision making.  Results for the remaining genes will be available in 3-4 weeks.    Gabriella was provided with a CancerNext  handout, which lists the included genes and associated cancer risks, from [employer].    Medical Management: For Gabriella, we reviewed that the information from genetic testing may determine:    surgery to treat Gabriella's active cancer diagnosis (i.e. lumpectomy versus bilateral mastectomy, ),    additional cancer screening for which Gabriella may qualify (i.e. mammogram and breast MRI, more frequent colonoscopies, more frequent dermatologic exams, etc.),    options for risk reducing surgeries Gabriella could consider (i.e. bilateral mastectomy, surgery to remove the ovaries and/or uterus, etc.),      And possible targeted chemotherapies for Isiss active cancer if indicated, or if she were to develop certain cancers in the future (i.e. immunotherapy for individuals with Aleman syndrome, PARP inhibitors, etc.).     These recommendations and possible targeted chemotherapies will be discussed in detail once genetic testing is completed.     Plan:  1) Today Gabriella elected to proceed with the CancerNext gene panel offered through [employer].  2) This information should be available in 3-4 weeks.  Results for the following 8 genes (MERRILL, BRCA1, BRCA2, CDH1, CHEK2, PALB2, PTEN, and TP53) will be reported within 1-2 weeks for surgical decision making.   3)  I will call Gabriella to discuss the results of her testing once completed      Face to face time: 45 minutes    Rhea Zarate MS, [facility]  Licensed Genetic Counselor  [phone]

## 2018-10-05 NOTE — PATIENT INSTRUCTIONS
Assessing Cancer Risk  Only about 5-10% of cancers are thought to be due to an inherited cancer susceptibility gene.    These families often have:    Several people with the same or related types of cancer    Cancers diagnosed at a young age (before age 50)    Individuals with more than one primary cancer    Multiple generations of the family affected with cancer    Some people may be candidates for genetic testing of more than one gene.  For these families, genetic testing using a cancer panel may be offered.  These panels will test different genes known to increase the risk for breast, ovarian, uterine, and/or other cancers. All of the genes discussed below have published clinical management guidelines for individuals who are found to carry a mutation. The purpose of this handout is to serve as a brief summary of the genes analyzed by the panels used to inquire about hereditary breast and gynecologic cancer:  MERRILL, BRCA1, BRCA2, BRIP1, CDH1, CHEK2, MLH1, MSH2, MSH6, PMS2, EPCAM, PTEN, PALB2, RAD51C, RAD51D, and TP53.  ______________________________________________________________________________  Hereditary Breast and Ovarian Cancer Syndrome   (BRCA1 and BRCA2)  A single mutation in one of the copies of BRCA1 or BRCA2 increases the risk for breast and ovarian cancer, among others.  The risk for pancreatic cancer and melanoma may also be slightly increased in some families.  The chart below shows the chance that someone with a BRCA mutation would develop cancer in his or her lifetime1,2,3,4.        A person s ethnic background is also important to consider, as individuals of Ashkenazi Yazidi ancestry have a higher chance of having a BRCA gene mutation.  There are three BRCA mutations that occur more frequently in this population.    Aleman Syndrome   (MLH1, MSH2, MSH6, PMS2, and EPCAM)  Currently five genes are known to cause Aleman Syndrome: MLH1, MSH2, MSH6, PMS2, and EPCAM.  A single mutation in one of the  Aleman Syndrome genes increases the risk for colon, endometrial, ovarian, and stomach cancers.  Other cancers that occur less commonly in Aleman Syndrome include urinary tract, skin, and brain cancers.  The chart below shows the chance that a person with Aleman syndrome would develop cancer in his or her lifetime5.      *Cancer risk varies depending on Aleman syndrome gene found    Cowden Syndrome   (PTEN)  Cowden syndrome is a hereditary condition that increases the risk for breast, thyroid, endometrial, colon, and kidney cancer.  Cowden syndrome is caused by a mutation in the PTEN gene.  A single mutation in one of the copies of PTEN causes Cowden syndrome and increases cancer risk.  The chart below shows the chance that someone with a PTEN mutation would develop cancer in their lifetime6,7.  Other benign features seen in some individuals with Cowden syndrome include benign skin lesions (facial papules, keratoses, lipomas), learning disability, autism, thyroid nodules, colon polyps, and larger head size.      *One recent study found breast cancer risk to be increased to 85%    Li-Fraumeni Syndrome   (TP53)  Li-Fraumeni Syndrome (LFS) is a cancer predisposition syndrome caused by a mutation in the TP53 gene. A single mutation in one of the copies of TP53 increases the risk for multiple cancers. Individuals with LFS are at an increased risk for developing cancer at a young age. The lifetime risk for development of a LFS-associated cancer is 50% by age 30 and 90% by age 60.   Core Cancers: Sarcomas, Breast, Brain, Lung, Leukemias/Lymphomas, Adrenocortical carcinomas  Other Cancers: Gastrointestinal, Thyroid, Skin, Genitourinary    Hereditary Diffuse Gastric Cancer   (CDH1)  Currently, one gene is known to cause hereditary diffuse gastric cancer (HDGC): CDH1.  Individuals with HDGC are at increased risk for diffuse gastric cancer and lobular breast cancer. Of people diagnosed with HDGC, 30-50% have a mutation in the CDH1  gene.  This suggests there are likely other genes that may cause HDGC that have not been identified yet.      Lifetime Cancer Risks    General Population HDGC    Diffuse Gastric  <1% ~80%   Breast 12% 39-52%         Additional Genes  MERRILL  MERRILL is a moderate-risk breast cancer gene. Women who have a mutation in MERRILL can have between a 2-4 fold increased risk for breast cancer compared to the general population8. MERRILL mutations have also been associated with increased risk for pancreatic cancer, however an estimate of this cancer risk is not well understood9. Individuals who inherit two MERRILL mutations have a condition called ataxia-telangiectasia (AT).  This rare autosomal recessive condition affects the nervous system and immune system, and is associated with progressive cerebellar ataxia beginning in childhood.  Individuals with ataxia-telangiectasia often have a weakened immune system and have an increased risk for childhood cancers.    PALB2  Mutations in PALB2 have been shown to increase the risk of breast cancer up to 33-58% in some families; where individuals fall within this risk range is dependent upon family [id number]. PALB2 mutations have also been associated with increased risk for pancreatic cancer, although this risk has not been quantified yet.  Individuals who inherit two PALB2 mutations--one from their mother and one from their father--have a condition called Fanconi Anemia.  This rare autosomal recessive condition is associated with short stature, developmental delay, bone marrow failure, and increased risk for childhood cancers.    CHEK2   CHEK2 is a moderate-risk breast cancer gene.  Women who have a mutation in CHEK2 have around a 2-fold increased risk for breast cancer compared to the general population, and this risk may be higher depending upon family history.11,12,13 Mutations in CHEK2 have also been shown to increase the risk of a number of other cancers, including colon and prostate, however  these cancer risks are currently not well understood.    BRIP1, RAD51C and RAD51D  Mutations in BRIP1, RAD51C, and RAD51D have been shown to increase the risk of ovarian cancer and possibly female breast cancer as well14,15 .       Lifetime Cancer Risk    General Population BRIP1 RAD51C RAD51D   Ovarian 1-2% ~5-8% ~5-9% ~7-15%           Inheritance  All of the cancer syndromes reviewed above are inherited in an autosomal dominant pattern.  This means that if a parent has a mutation, each of his or her children will have a 50% chance of inheriting that same mutation.  Therefore, each child--male or female--would have a 50% chance of being at increased risk for developing cancer.      Image obtained from Genetics Home Reference, 2013     Mutations in some genes can occur de abdirizak, which means that a person s mutation occurred for the first time in them and was not inherited from a parent.  Now that they have the mutation, however, it can be passed on to future generations.    Genetic Testing  Genetic testing involves a blood test and will look at the genetic information in the MERRILL, BRCA1, BRCA2, BRIP1, CDH1, CHEK2, MLH1, MSH2, MSH6, PMS2, EPCAM, PTEN, PALB2, RAD51C, RAD51D, and TP53 genes for any harmful mutations that are associated with increased cancer risk.  If possible, it is recommended that the person(s) who has had cancer be tested before other family members.  That person will give us the most useful information about whether or not a specific gene is associated with the cancer in the family.    Results  There are three possible results of genetic testing:    Positive--a harmful mutation was identified in one or more of the genes    Negative--no mutation was identified in any of the genes on this panel    Variant of unknown significance--a variation in one of the genes was identified, but it is unclear how this impacts cancer risk in the family    Advantages and Disadvantages   There are advantages and  disadvantages to genetic testing.    Advantages    May clarify your cancer risk    Can help you make medical decisions    May explain the cancers in your family    May give useful information to your family members (if you share your results)    Disadvantages    Possible negative emotional impact of learning about inherited cancer risk    Uncertainty in interpreting a negative test result in some situations    Possible genetic discrimination concerns (see below)    Genetic Information Nondiscrimination Act (LASHAWN)  LASHAWN is a federal law that protects individuals from health insurance or employment discrimination based on a genetic test result alone.  Although rare, there are currently no legal discrimination protections in terms of life insurance, long term care, or disability insurances.  Visit the [employer] website to learn more.    Reducing Cancer Risk  All of the genes described above have nationally recognized cancer screening guidelines that would be recommended for individuals who test positive.  In addition to increased cancer screening, surgeries may be offered or recommended to reduce cancer risk.  Recommendations are based upon an individual s genetic test result as well as their personal and family history of cancer.    Questions to Think About Regarding Genetic Testing:    What effect will the test result have on me and my relationship with my family members if I have an inherited gene mutation?  If I don t have a gene mutation?    Should I share my test results, and how will my family react to this news, which may also affect them?    Are my children ready to learn new information that may one day affect their own health?    Hereditary Cancer Resources    FORCE: Facing Our Risk of Cancer Empowered facingourrisk.org   Bright Pink bebrightpink.org   Li-Fraumeni Syndrome Association lfsassociation.org   PTEN World PTENworld.com   No stomach for cancer, Inc.  nostomachforcancer.org   Stomach cancer relief network Scrnet.org   Collaborative Group of the Americas on Inherited Colorectal Cancer (CGA) cgaicc.com    Cancer Care cancercare.org   American Cancer Society (ACS) cancer.org   National Cancer [place] (NCI) cancer.gov     Please call us if you have any questions or concerns.   Cancer Risk Management Program [phone] ([phone])  ? Vera Lopez, MS, [facility]  [phone]  ? Denae Sandhu, MS, [facility]  [phone]  ? Rhea Zarate, MS, [facility]  [phone]  ? Isak Baez, MS, [facility]  [phone]  ? Kiesha Federico, MS, [facility] [phone]    References  1. Isabel CHAPIN, Uma PDP, Edson S, Dain SOLANO, Nunu JE, Naomi JL, Dyllan N, Aubree H, Erik O, Pineda A, Norma B, Delroy P, Uriel S, Helder DM, Randy N, Claudio E, Mally H, Quan E, Lubinski J, Gronwald J, Donny B, Tulinius H, Thorlacius S, Eerola H, Pablo H, Chiquis K, Levon OP. Average risks of breast and ovarian cancer associated with BRCA1 or BRCA2 mutations detected in case series unselected for family history: a combined analysis of 222 studies. Am J Hum Teri. 2003;72:1117-30.  2. Matidle N, Karen M, Linda G.  BRCA1 and BRCA2 Hereditary Breast and Ovarian Cancer. Gene Reviews online. 2013.  3. Reginald YC, Dolly S, Madalyn G, Lopez S. Breast cancer risk among male BRCA1 and BRCA2 mutation carriers. J Natl Cancer Inst. 2007;99:1811-4.  4. Carl BULLOCK, Ramon I, Marlo J, Chris E, Cee ER, Bharath F. Risk of breast cancer in male BRCA2 carriers. J Med Teri. 2010;47:710-1.  5. National Comprehensive Cancer Network. Clinical practice guidelines in oncology, colorectal cancer screening. Available online (registration required). 2015.  6. Mendel HAWLEY, Dick J, Radha J, Sandy LA, Delroy MS, Eng C. Lifetime cancer risks in individuals with germline PTEN mutations. Clin Cancer Res. 2012;18:400-7.  7. Lio SINGH. Cowden Syndrome: A Critical Review of the Clinical Literature. J Teri .  2009:18:13-27.  8. Noel A, Jose D, Dallas S, Tania P, Brandon T, Bobby M, Jeet B, Stoney H, Sierra R, Linus K, Humble L, Carl DG, Helder D, Al DF, Chari MR, The Breast Cancer Susceptibility Collaboration () & Cody SMART. MERRILL mutations that cause ataxia-telangiectasia are breast cancer susceptibility alleles. Nature Genetics. 2006;38:873-875  9. Fadi N , Edmundo Y, Cinthia J, Victoria L, Ashley GM , Paul ML, Gallinger S, Reid AG, Syngal S, Jaydon ML, Nicolle J , Elena R, Bobby SZ, Jasper JR, Rasheed VE, Tracy M, Vopatria B, Azul N, Matt RH, Torin KW, and Jayce AP. MERRILL mutations in patients with hereditary pancreatic cancer. Cancer Discover. 2012;2:41-46  10. Isabel DEMPSEY, et al. Breast-Cancer Risk in Families with Mutations in PALB2. NEJM. 2014; 371(6):497-506.  11. CHEK2 Breast Cancer Case-Control Consortium. CHEK2*1100delC and susceptibility to breast cancer: A collaborative analysis involving 10,860 breast cancer cases and 9,065 controls from 10 studies. Am J Hum Teri, 74 (2004), pp. 3500-2639  12. Cristhian T, Ingrid S, Kim K, et al. Spectrum of Mutations in BRCA1, BRCA2, CHEK2, and TP53 in Families at High Risk of Breast Cancer. MANJINDER. 2006;295(12):0841-2108.   13. Laurent C, Zonia D, Francine A, et al. Risk of breast cancer in women with a CHEK2 mutation with and without a family history of breast cancer. J Clin Oncol. 2011;29:7255-4703.  14. Kenrick CASTELAN, Michael E, Minnie SJ, et al. Contribution of germline mutations in the RAD51B, RAD51C, and RAD51D genes to ovarian cancer in the population. J Clin Oncol. 2015;33(26):2823-9878. Doi:10.1200/JCO.2015.61.2408.  15. Viveinne T, Gabriel HURLEY, Kayla P, et al. Mutations in BRIP1 confer high risk of ovarian cancer. Mary Grace Teri. 2011;43(11):5385-0636. doi:10.1038/ng.955.

## 2018-10-05 NOTE — MR AVS SNAPSHOT
After Visit Summary   10/5/2018    Gabriella Villarreal    MRN: [id number]           Patient Information     Date Of Birth          1965        Visit Information        Provider Department      10/5/2018 10:15 AM Rhea Zarate GC;  2 [address] CONSULT [facility]        Today's Diagnoses     Malignant neoplasm of upper-outer quadrant of right breast in female, estrogen receptor positive (H)    -  1    Family history of malignant neoplasm of ovary        Family history of prostate cancer          Care Instructions        Assessing Cancer Risk  Only about 5-10% of cancers are thought to be due to an inherited cancer susceptibility gene.    These families often have:    Several people with the same or related types of cancer    Cancers diagnosed at a young age (before age 50)    Individuals with more than one primary cancer    Multiple generations of the family affected with cancer    Some people may be candidates for genetic testing of more than one gene.  For these families, genetic testing using a cancer panel may be offered.  These panels will test different genes known to increase the risk for breast, ovarian, uterine, and/or other cancers. All of the genes discussed below have published clinical management guidelines for individuals who are found to carry a mutation. The purpose of this handout is to serve as a brief summary of the genes analyzed by the panels used to inquire about hereditary breast and gynecologic cancer:  MERRILL, BRCA1, BRCA2, BRIP1, CDH1, CHEK2, MLH1, MSH2, MSH6, PMS2, EPCAM, PTEN, PALB2, RAD51C, RAD51D, and TP53.  ______________________________________________________________________________  Hereditary Breast and Ovarian Cancer Syndrome   (BRCA1 and BRCA2)  A single mutation in one of the copies of BRCA1 or BRCA2 increases the risk for breast and ovarian cancer, among others.  The risk for pancreatic cancer and melanoma may also be slightly increased in some  families.  The chart below shows the chance that someone with a BRCA mutation would develop cancer in his or her lifetime1,2,3,4.        A person s ethnic background is also important to consider, as individuals of Ashkenazi Shinto ancestry have a higher chance of having a BRCA gene mutation.  There are three BRCA mutations that occur more frequently in this population.    Aleman Syndrome   (MLH1, MSH2, MSH6, PMS2, and EPCAM)  Currently five genes are known to cause Aleman Syndrome: MLH1, MSH2, MSH6, PMS2, and EPCAM.  A single mutation in one of the Aleman Syndrome genes increases the risk for colon, endometrial, ovarian, and stomach cancers.  Other cancers that occur less commonly in Aleman Syndrome include urinary tract, skin, and brain cancers.  The chart below shows the chance that a person with Aleman syndrome would develop cancer in his or her lifetime5.      *Cancer risk varies depending on Aleman syndrome gene found    Cowden Syndrome   (PTEN)  Cowden syndrome is a hereditary condition that increases the risk for breast, thyroid, endometrial, colon, and kidney cancer.  Cowden syndrome is caused by a mutation in the PTEN gene.  A single mutation in one of the copies of PTEN causes Cowden syndrome and increases cancer risk.  The chart below shows the chance that someone with a PTEN mutation would develop cancer in their lifetime6,7.  Other benign features seen in some individuals with Cowden syndrome include benign skin lesions (facial papules, keratoses, lipomas), learning disability, autism, thyroid nodules, colon polyps, and larger head size.      *One recent study found breast cancer risk to be increased to 85%    Li-Fraumeni Syndrome   (TP53)  Li-Fraumeni Syndrome (LFS) is a cancer predisposition syndrome caused by a mutation in the TP53 gene. A single mutation in one of the copies of TP53 increases the risk for multiple cancers. Individuals with LFS are at an increased risk for developing cancer at a young  age. The lifetime risk for development of a LFS-associated cancer is 50% by age 30 and 90% by age 60.   Core Cancers: Sarcomas, Breast, Brain, Lung, Leukemias/Lymphomas, Adrenocortical carcinomas  Other Cancers: Gastrointestinal, Thyroid, Skin, Genitourinary    Hereditary Diffuse Gastric Cancer   (CDH1)  Currently, one gene is known to cause hereditary diffuse gastric cancer (HDGC): CDH1.  Individuals with HDGC are at increased risk for diffuse gastric cancer and lobular breast cancer. Of people diagnosed with HDGC, 30-50% have a mutation in the CDH1 gene.  This suggests there are likely other genes that may cause HDGC that have not been identified yet.      Lifetime Cancer Risks    General Population HDGC    Diffuse Gastric  <1% ~80%   Breast 12% 39-52%         Additional Genes  MERRILL  MERRILL is a moderate-risk breast cancer gene. Women who have a mutation in MERRILL can have between a 2-4 fold increased risk for breast cancer compared to the general population8. MERRILL mutations have also been associated with increased risk for pancreatic cancer, however an estimate of this cancer risk is not well understood9. Individuals who inherit two MERRILL mutations have a condition called ataxia-telangiectasia (AT).  This rare autosomal recessive condition affects the nervous system and immune system, and is associated with progressive cerebellar ataxia beginning in childhood.  Individuals with ataxia-telangiectasia often have a weakened immune system and have an increased risk for childhood cancers.    PALB2  Mutations in PALB2 have been shown to increase the risk of breast cancer up to 33-58% in some families; where individuals fall within this risk range is dependent upon family [email or web address]. PALB2 mutations have also been associated with increased risk for pancreatic cancer, although this risk has not been quantified yet.  Individuals who inherit two PALB2 mutations--one from their mother and one from their father--have a condition called  Fanconi Anemia.  This rare autosomal recessive condition is associated with short stature, developmental delay, bone marrow failure, and increased risk for childhood cancers.    CHEK2   CHEK2 is a moderate-risk breast cancer gene.  Women who have a mutation in CHEK2 have around a 2-fold increased risk for breast cancer compared to the general population, and this risk may be higher depending upon family history.11,12,13 Mutations in CHEK2 have also been shown to increase the risk of a number of other cancers, including colon and prostate, however these cancer risks are currently not well understood.    BRIP1, RAD51C and RAD51D  Mutations in BRIP1, RAD51C, and RAD51D have been shown to increase the risk of ovarian cancer and possibly female breast cancer as well14,15 .       Lifetime Cancer Risk    General Population BRIP1 RAD51C RAD51D   Ovarian 1-2% ~5-8% ~5-9% ~7-15%           Inheritance  All of the cancer syndromes reviewed above are inherited in an autosomal dominant pattern.  This means that if a parent has a mutation, each of his or her children will have a 50% chance of inheriting that same mutation.  Therefore, each child--male or female--would have a 50% chance of being at increased risk for developing cancer.      Image obtained from Genetics Home Reference, 2013     Mutations in some genes can occur de abdirizak, which means that a person s mutation occurred for the first time in them and was not inherited from a parent.  Now that they have the mutation, however, it can be passed on to future generations.    Genetic Testing  Genetic testing involves a blood test and will look at the genetic information in the MERRILL, BRCA1, BRCA2, BRIP1, CDH1, CHEK2, MLH1, MSH2, MSH6, PMS2, EPCAM, PTEN, PALB2, RAD51C, RAD51D, and TP53 genes for any harmful mutations that are associated with increased cancer risk.  If possible, it is recommended that the person(s) who has had cancer be tested before other family members.  That  person will give us the most useful information about whether or not a specific gene is associated with the cancer in the family.    Results  There are three possible results of genetic testing:    Positive--a harmful mutation was identified in one or more of the genes    Negative--no mutation was identified in any of the genes on this panel    Variant of unknown significance--a variation in one of the genes was identified, but it is unclear how this impacts cancer risk in the family    Advantages and Disadvantages   There are advantages and disadvantages to genetic testing.    Advantages    May clarify your cancer risk    Can help you make medical decisions    May explain the cancers in your family    May give useful information to your family members (if you share your results)    Disadvantages    Possible negative emotional impact of learning about inherited cancer risk    Uncertainty in interpreting a negative test result in some situations    Possible genetic discrimination concerns (see below)    Genetic Information Nondiscrimination Act (LASHAWN)  LSAHAWN is a federal law that protects individuals from health insurance or employment discrimination based on a genetic test result alone.  Although rare, there are currently no legal discrimination protections in terms of life insurance, long term care, or disability insurances.  Visit the National [employer] [place] website to learn more.    Reducing Cancer Risk  All of the genes described above have nationally recognized cancer screening guidelines that would be recommended for individuals who test positive.  In addition to increased cancer screening, surgeries may be offered or recommended to reduce cancer risk.  Recommendations are based upon an individual s genetic test result as well as their personal and family history of cancer.    Questions to Think About Regarding Genetic Testing:    What effect will the test result have on me and my relationship  with my family members if I have an inherited gene mutation?  If I don t have a gene mutation?    Should I share my test results, and how will my family react to this news, which may also affect them?    Are my children ready to learn new information that may one day affect their own health?    Hereditary Cancer Resources    FORCE: Facing Our Risk of Cancer Empowered facingourrisk.org   Bright Pink bebrightpink.org   Li-Fraumeni Syndrome Association lfsassociation.org   PTEN World PTENworld.com   No stomach for cancer, Inc. nostomachforcancer.org   Stomach cancer relief network Scrnet.org   Collaborative Group of the Americas on Inherited Colorectal Cancer (CGA) cgaicc.com    Cancer Care cancercare.org   American Cancer Society (ACS) cancer.org   National Cancer [place] (NCI) cancer.gov     Please call us if you have any questions or concerns.   Cancer Risk Management Program [phone] ([phone])  ? Vera Lopez, MS, [facility]  [phone]  ? Denae Sandhu, MS, [facility]  [phone]  ? Rhea Zarate, MS, [facility]  [phone]  ? Isak Baez, MS, [facility]  [phone]  ? Kiesha Caballero, MS, [facility] [phone]    References  1. Isabel CHAPIN, Uma PDP, Edson S, Dain SOLANO, Nunu JE, Naomi JL, Dyllan N, Aubree H, Erik O, Pineda A, Norma B, Delroy P, Uriel S, Helder DM, Randy N, Claudio E, Mally H, Quan E, Fritz J, Espinoza J, Donny B, Lin H, Thorlacius S, Eerola H, Pablo H, Chiquis K, Levon OP. Average risks of breast and ovarian cancer associated with BRCA1 or BRCA2 mutations detected in case series unselected for family history: a combined analysis of 222 studies. Am J Hum Teri. 2003;72:1117-30.  2. Matilde SMART, Karen M, Linda G.  BRCA1 and BRCA2 Hereditary Breast and Ovarian Cancer. Gene Reviews online. 2013.  3. Reginald YC, Dolly S, Madalyn G, Lopez S. Breast cancer risk among male BRCA1 and BRCA2 mutation carriers. J Natl Cancer Inst. 2007;99:1811-4.  4. Carl BULLOCK, Ramon MORGAN, Marlo  J, Chris E, Cee ER, Bharath F. Risk of breast cancer in male BRCA2 carriers. J Med Teri. 2010;47:710-1.  5. National Comprehensive Cancer Network. Clinical practice guidelines in oncology, colorectal cancer screening. Available online (registration required). 2015.  6. Mendel MH, Dick J, Radha J, aSndy LA, Delroy MS, Laureano C. Lifetime cancer risks in individuals with germline PTEN mutations. Clin Cancer Res. 2012;18:400-7.  7. Lio SINGH. Cowden Syndrome: A Critical Review of the Clinical Literature. J Teri . 2009:18:13-27.  8. Noel A, Jose D, Dallas S, Tania P, Brandon T, Bobby M, Jeet B, Stoney H, Sierra R, Linus K, Humble L, Carl DG, Helder D, Al DF, Chari MR, The Breast Cancer Susceptibility Collaboration (UK) & Cody SMART. MERRILL mutations that cause ataxia-telangiectasia are breast cancer susceptibility alleles. Nature Genetics. 2006;38:873-875  9. Fadi N , Edmundo Y, Cinthia J, Victoria L, Ashley GM , Paul ML, Gallinger S, Reid AG, Syngal S, Jaydon ML, Nicolle J , Elena R, Bobby SZ, Jasper JR, Rasheed VE, Tracy M, Vogelstein B, Azul N, Matt RH, Torin KW, and Eduardo AP. MERRILL mutations in patients with hereditary pancreatic cancer. Cancer Discover. 2012;2:41-46  10. Isabel DEMPSEY, et al. Breast-Cancer Risk in Families with Mutations in PALB2. NEJM. 2014; 371(6):497-506.  11. CHEK2 Breast Cancer Case-Control Consortium. CHEK2*1100delC and susceptibility to breast cancer: A collaborative analysis involving 10,860 breast cancer cases and 9,065 controls from 10 studies. Am J Hum Teri, 74 (2004), pp. 7236-9131  12. Cristhian T, Ingrid S, Kim K, et al. Spectrum of Mutations in BRCA1, BRCA2, CHEK2, and TP53 in Families at High Risk of Breast Cancer. MANJINDER. 2006;295(12):5123-4090.   13. Laurent C, Zonia D, Francine CHAPIN, et al. Risk of breast cancer in women with a CHEK2 mutation with and without a family history of breast cancer. J Clin Oncol.  2011;29:4892-6991.  14. Kenrick H, Michael E, Minnie SJ, et al. Contribution of germline mutations in the RAD51B, RAD51C, and RAD51D genes to ovarian cancer in the population. J Clin Oncol. 2015;33(26):7017-8635. Doi:10.1200/JCO.2015.61.2408.  15. Vivienne T, Gabriel DF, Kayla P, et al. Mutations in BRIP1 confer high risk of ovarian cancer. Mary Grace Teri. 2011;43(11):3759-3883. doi:10.1038/ng.955.            Follow-ups after your visit        Your next 10 appointments already scheduled     Oct 05, 2018 11:30 AM T   giddy Lab Draw with  [facility] LAB DRAW   [facility] Lab Draw ([facility] [place])    [address] [place] [address]  [address] [place] [address] [id number] [phone]              Future tests that were ordered for you today     Open Future Orders        Priority Expected Expires Ordered    [facility]: Laboratory Miscellaneous Order Routine  10/5/2019 10/5/2018    MA Breast Wire Placement 1st Lesion Righ Routine  10/5/2019 10/4/2018    MA Breast Specimen Right Routine  10/5/2019 10/4/2018    NM Lymphoscintigraphy Injection only Routine  10/5/2019 10/4/2018    US Sentinal Node Injection/ID Right Routine  10/5/2019 10/4/2018            Who to contact     If you have questions or need follow up information about today's clinic visit or your schedule please contact [facility] directly at [phone].  Normal or non-critical lab and imaging results will be communicated to you by MyChart, letter or phone within 4 business days after the clinic has received the results. If you do not hear from us within 7 days, please contact the clinic through MyChart or phone. If you have a critical or abnormal lab result, we will notify you by phone as soon as possible.  Submit refill requests through [employer] or call your pharmacy and they will forward the refill request to us. Please allow 3 business days for your refill to be completed.          Additional Information About  Your Visit        [employer] Information     [employer] gives you secure access to your electronic health record. If you see a primary care provider, you can also send messages to your care team and make appointments. If you have questions, please call your primary care clinic.  If you do not have a primary care provider, please call [phone] and they will assist you.        Care EveryWhere ID     This is your Care EveryWhere ID. This could be used by other organizations to access your [facility] medical records  [id number]         Blood Pressure from Last 3 Encounters:   09/28/18 120/80    Weight from Last 3 Encounters:   09/28/18 77.6 kg (171 lb)               Primary Care Provider Office Phone # Fax #    Anna CHAPIN Renan [phone]       [place] [id number] [facility] [address]        Equal Access to Services     [facility] PELON : Hadii abbie hess hadasho Sojames, waaxda luqadaha, qaybta kaalmada adesofiayada, clyed tidwell . So [facility] [phone].    ATENCIÓN: Si habla español, tiene a luna disposición servicios gratuitos de asistencia lingüística. Pastora al [phone].    We comply with applicable federal civil rights laws and [place] laws. We do not discriminate on the basis of race, color, national origin, age, disability, sex, sexual orientation, or gender identity.            Thank you!     Thank you for choosing [facility]  for your care. Our goal is always to provide you with excellent care. Hearing back from our patients is one way we can continue to improve our services. Please take a few minutes to complete the written survey that you may receive in the mail after your visit with us. Thank you!             Your Updated Medication List - Protect others around you: Learn how to safely use, store and throw away your medicines at www.disposemymeds.org.          This list is accurate as of 10/5/18 11:13 AM.  Always use your most recent med  list.                   Brand Name Dispense Instructions for use Diagnosis    albuterol 108 (90 Base) MCG/ACT inhaler    PROAIR HFA/PROVENTIL HFA/VENTOLIN HFA     Inhale 2-4 puffs into the lungs        chlorpheniramine 4 MG tablet    CHLOR-TRIMETON          diphenhydrAMINE 25 MG tablet    BENADRYL          fluticasone 50 MCG/ACT spray    FLONASE     INSTILL 2 SPRAYS INTO BOTH NOSTRILS DAILY.        ibuprofen 200 MG tablet    ADVIL/MOTRIN     Take 200 mg by mouth        levothyroxine 100 MCG tablet    SYNTHROID/LEVOTHROID

## 2018-10-05 NOTE — NURSING NOTE
Chief Complaint   Patient presents with     Lab Only      genetics labs drawn by CMA.     Fariba Mccracken, AMY

## 2018-10-05 NOTE — LETTER
Cancer Risk Management  Program Locations    [facility]  [facility]  Mailing Address  Cancer Risk Management Program  [facility]  [address]  [place] [address]    New patient appointments  [phone]  October 9, 2018    Gabriella Villarreal  [address] [place] [address]      Dear Gabriella,    It was a pleasure meeting with you at the [facility] on 10/5/2018.  Here is a copy of the progress note from your recent genetic counseling visit to the Cancer Risk Management Program.  If you have any additional questions, please feel free to call.    Referring Provider: Kalina Oviedo MD    Presenting Information:   I met with Gabriella Villarreal today for genetic counseling at the Cancer Risk Management Program at the [facility] to discuss her recent breast cancer diagnosis and family history of ovarian cancer.  She is here today to review this history and available genetic testing options.    Personal History:  Gabriella is a 53 year old female.  She was diagnosed with invasive ductal carcinoma of her right breast at age 53 (ER/MT positive and Her2 negative); breast conservation surgery is planned in two weeks, however the results from genetic testing will help with surgical decision making (such as bilateral mastectomy).      Gabriella has a history of left salpingo-oophorectomy due to a benign cyst, and has her right ovary, fallopian tube as well as uterus in place.  She reports being perimenopause.  She reports one prior breast biopsy which was benign.   She does not regularly do any other cancer screening at this time.  Gabriella reported no tobacco use, and no concerns regarding alcohol use or environmental exposures.    Family History: (Please see scanned pedigree for detailed family history  information)    One maternal aunt was diagnosed with ovarian cancer in her 60's and passed away at age 65    Her father was diagnosed with prostate cancer in his 70's and is now 75    One paternal uncle had a history of either anal or rectal cancer at age 61 and passed away at age 65    One paternal first cousin was diagnosed with ovarian cancer at age 30 and passed away at 35.  She reportedly completed genetic testing approximately 8 years ago which was negative for the BRCA1/2 genes.     Her maternal ethnicity is [place]. Her paternal ethnicity is Polish/Wallisian.  There is no known Ashkenazi Episcopal ancestry on either side of her family.     Discussion:    Gabriella's recent breast cancer diagnosis and family history of ovarian cancer is suggestive of a hereditary cancer syndrome.    We reviewed the features of sporadic, familial, and hereditary cancers.  Based on her personal and family history, Gabriella meets current National Comprehensive Cancer Network (NCCN) criteria for genetic testing of BRCA1/2.       We discussed the natural history and genetics of Hereditary Breast and Ovarian Cancer syndrome caused by mutations in the BRCA1/2 genes.  Due to the family history of ovarian cancer, we also discussed the natural history and genetics of Aleman syndrome due to mismatch repair gene mutations (MLH1, MSH2, MSH6, PMS2, EPCAM). We discussed that there are additional genes that could cause increased risk for breast and ovarian cancer.  As many of these genes present with overlapping features in a family and accurate cancer risk cannot always be established based upon the pedigree analysis alone, it would be reasonable for Gabriella to consider panel genetic testing to analyze multiple genes at once.    A detailed handout regarding hereditary breast and gynecologic cancers and the information we discussed was provided to Gabriella at the end of our appointment today and can be found in the after visit summary.  Topics  included: inheritance pattern, cancer risks, cancer screening recommendations, and also risks, benefits and limitations of testing.    We reviewed genetic testing options for hereditary breast, gynecologic and cancers: actionable high/moderate risk breast and gynecologic cancer risk custom panel (CustomNext-Cancer, 19 genes, a combination of GynPlus and BRCAplus + NBN, STK11, and NF1), expanded high and moderate risk breast and gynecologic panel testing (OvaNext, 25 genes), and expanded genetic testing for hereditary breast, gynecologic, prostate and gastrointestinal cancers (CancerNext, 34 genes).  Gabriella expressed an interest in learning as much information as possible from the testing. She opted for the CancerNext panel.   Genetic testing is available for 34 genes associated with hereditary cancer: CancerNext (APC, MERRILL, BARD1, BRCA1, BRCA2, BRIP1, BMPR1A, CDH1, CDK4, CDKN2A, CHEK2, DICER1, EPCAM, GREM1, HOXB13, MLH1, MRE11A, MSH2, MSH6, MUTYH, NBN, NF1, PALB2, PMS2, POLD1, POLE, PTEN, RAD50, RAD51C, RAD51D, SMAD4, SMARCA4, STK11, and TP53).  We discussed that many of the genes in the CancerNext panel have known risks and published management guidelines.  Some genes are associated with specific hereditary cancer syndromes: Hereditary Breast and Ovarian Cancer syndrome (BRCA1, BRCA2), Aleman syndrome (MLH1, MSH2, MSH6, PMS2, EPCAM), Familial Adenomatous Polyposis syndrome (APC), Hereditary Gastric Cancer syndrome (CDH1), Familial Atypical Multiple Mole Melanoma syndrome (CDK4, CDKN2A), Juvenile Polyposis syndrome (BMPR1A, SMAD4), Cowden syndrome (PTEN), Li Fraumeni syndrome (TP53), Peutz-Jeghers syndrome (STK11), MUTYH Associated Polyposis syndrome (MUTYH), and Neurofibromatosis type 1 (NF1).   The MERRILL, BRIP1, CHEK2, GREM1, NBN , PALB2, POLD1, POLE, RAD51C, and RAD51D genes are associated with increased cancer risk and have published management guidelines for certain cancers.    The remaining genes (BARD1, DICER1,  HOXB13, MRE11A, RAD50, and SMARCA4) are associated with increased cancer risk and may allow us to make medical recommendations when mutations are identified.    Consent was obtained and genetic testing for CancerNext was sent to [facility]. Results for the following 8 genes (MERRILL, BRCA1, BRCA2, CDH1, CHEK2, PALB2, PTEN, TP53) will be reported first within 1-2 weeks for treatment decision making.  Results for the remaining genes will be available in 3-4 weeks.    Gabriella was provided with a CancerNext handout, which lists the included genes and associated cancer risks, from [facility].    Medical Management: For Gabriella, we reviewed that the information from genetic testing may determine:    surgery to treat Gabriella's active cancer diagnosis (i.e. lumpectomy versus bilateral mastectomy, ),    additional cancer screening for which Gabriella may qualify (i.e. mammogram and breast MRI, more frequent colonoscopies, more frequent dermatologic exams, etc.),    options for risk reducing surgeries Gabriella could consider (i.e. bilateral mastectomy, surgery to remove the ovaries and/or uterus, etc.),      And possible targeted chemotherapies for Isiss active cancer if indicated, or if she were to develop certain cancers in the future (i.e. immunotherapy for individuals with Aleman syndrome, PARP inhibitors, etc.).     These recommendations and possible targeted chemotherapies will be discussed in detail once genetic testing is completed.     Plan:  1) Today Gabriella elected to proceed with the CancerNext gene panel offered through [facility].  2) This information should be available in 3-4 weeks.  Results for the following 8 genes (MERRILL, BRCA1, BRCA2, CDH1, CHEK2, PALB2, PTEN, and TP53) will be reported within 1-2 weeks for surgical decision making.   3)  I will call Gabriella to discuss the results of her testing once completed      Rhea Zarate MS, [facility]  Licensed Genetic  Counselor  [phone]

## 2018-10-12 ENCOUNTER — TELEPHONE (OUTPATIENT)
Dept: SURGERY | Facility: CLINIC | Age: 53
End: 2018-10-12

## 2018-10-12 LAB — COPATH REPORT: NORMAL

## 2018-10-12 NOTE — TELEPHONE ENCOUNTER
Patient is scheduled for surgery with Dr. Oviedo    Spoke or left message with: Spoke to patient    Date of Surgery: 10/24/18 @ 11:50 a.m.    Location: [facility]    Informed patient they will need an adult      Pre-op with surgeon (if applicable):    H&P: Scheduled with PAC on 10/19/18 @ 4:45 p.m.    Additional imaging/appointments: wire loc. @ 10:00 a.m.    Surgery packet:      Additional comments: Patient is to arrive on 5 th floor [facility] @ 8:30 a.m. They will bring down to 2 nd floor for wire loc. Then back up to 5 th floor for procedure.

## 2018-10-16 ENCOUNTER — TELEPHONE (OUTPATIENT)
Dept: ONCOLOGY | Facility: CLINIC | Age: 53
End: 2018-10-16

## 2018-10-16 NOTE — LETTER
Cancer Risk Management  Program [facility]  [facility]  [facility]  Mailing Address  Cancer Risk Management Program  [facility]  [address]  [place] [address]    New patient appointments  [phone]  October 23, 2018    Gabriella Villarreal  [address] [place] [address]      Dear Gabriella,    It was a pleasure speaking with you on the phone on 10/16/2018.  Here is a copy of the progress note from our discussion.  If you have any additional questions, please feel free to call.    Referring Provider: Kalina Oviedo MD    Presenting Information:  I spoke to Gabriella by phone today to discuss her genetic testing results. Her blood was drawn on 10/5/2018. CancerNext panel testing was ordered  from [facility]. This testing was done because of Gabriella's recent breast cancer diagnosis and family history of ovarian and prostate cancer.  Results for all 34 genes included in the CancerNext panel are available.      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  Medical management decisions should NOT be made based on this test result alone.    Of note, Gabriella tested negative for mutations in the following genes: APC, MRERILL, BARD1, BRCA1, BRCA2, BRIP1, BMPR1A, CDH1, CDK4, CDKN2A, CHEK2, DICER1, EPCAM, HOXB13, GREM1, MLH1, MRE11A, MSH2, MSH6, MUTYH, NBN, NF1, PALB2, PMS2, POLD1, POLE, PTEN, RAD50, RAD51C, RAD51D, SMAD4, SMARCA4, STK11, and TP53 genes.    No mutations were found in any of the 34 genes analyzed.  This test involved sequencing and deletion/duplication analysis of all genes with  the exception of EPCAM and GREM1 (deletions only).  We reviewed the autosomal dominant inheritance of these genes.  Gabriella cannot pass on a mutation in any of these genes to her children based on this test result.  Mutations in these genes do not skip generations.      Interpretation:  We discussed several different interpretations of this inconclusive test result.  It is not clear if these specific variants are associated with increased cancer risk.  1. These variant may be benign changes that do not increase cancer risk.  2. One or both variants may be harmful mutations associated with an increased risk for certain cancers:    Harmful mutations in BRIP1 can cause increased risk for ovarian cancer and possibly breast cancer.  Studies have shown that the risk for ovarian cancer is around 9% (compared to 1-2% in the general population) for individuals with a pathogenic BRIP1 mutation.  The exact risks for breast cancer are not well defined at this time.  If individuals are found to have a mutation in the BRIP1 gene, we would discuss risk reduction surgery that can prevent the development of ovarian cancer.     PALB2 mutations increase the lifetime risk of breast cancer to approximately 33-58% over a woman s lifetime, compared to 12% in the general population.  PALB2 mutations have also been associated with an increased risk for pancreatic and possibly ovarian cancer.  If individuals are found to have a mutation in the PALB2 gene, we would recommend increased breast cancer screening at a younger age.    In rare situations in which both parents have a harmful mutation in the PALB2 or BRIP1 gene, their children each have a 25% risk for Fanconi Anemia (FA).  Fanconi anemia is a rare condition with onset in childhood.  Fanconi anemia often results in physical abnormalities, problems with growth, bone marrow failure, and increased risk for cancer.    If this variant is later classified as harmful, Gabriella would be  considered a carrier for FA.  For individuals of childbearing age with these mutations, genetic counseling and genetic testing may be advised for their partners.    The lab is working to determine if this variant is harmful or benign, and they will contact me if it is reclassified.  If these variants are determined to be a benign changes, there may be a different gene or combination of genes and environment that are associated with the cancers in Gabriella and/or her relatives.  It is also important to consider that one of her close relatives, such as her father and/or relatives diagnosed with ovarian cancer, may have had a mutation in one of the genes tested and she did not inherit it.      Inheritance:  We reviewed the autosomal dominant inheritance of these variants in the PALB2 and BRIP1 genes.  We discussed that Gabriella has a 50% chance to pass each variant to each of her children.   Likewise, her sister also has a 50% risk of having the same variant(s).  Because it is unclear what, if any, risk is associated with this variant, clinical genetic testing is not recommended for relatives.  Of note, Gabriella cannot pass on a currently identifiable mutation in any of these 34 genes to her children based on this test result.  Mutations in these genes do not skip generations.     Family Studies:  The laboratory may offer additional research based testing for specific relatives in order to help reclassify this variant.    Screening:  Based on this inconclusive test result, it is important for Gabriella and her relatives to refer back to the family history for appropriate cancer screening.      Gabriella s close female relatives remain at increased risk for breast cancer given their family history.  Breast cancer screening is generally recommended to begin approximately 10 years younger than the earliest age of breast cancer diagnosis in the family, or at age 40, whichever comes first.  In this family, screening may begin  at age 40.  Breast screening options should be discussed with an individual's primary care provider and a genetic counselor, to determine what screening is appropriate, or if additional screening (such as breast MRI) is necessary based on personal/family history factors.      Close relatives may remain at slightly increased risk for ovarian cancer due to the family history.  Available ovarian cancer screening may include pelvic exams, CA-125 blood tests, and transvaginal ultrasounds, though there are significant limitations of this screening.  As such, this screening is not typically recommended.  That being said, close female relatives of her relatives with ovarian cancer should discuss this screening and the signs and symptoms of ovarian cancer with their primary OB/GYN provider, as they may have individualized recommendations.    Other population cancer screening, such as recommendations by the American Cancer Society and the National Comprehensive Cancer Network (NCCN), are also appropriate for Gabriella and her family.  These screening recommendations may change if there are changes to Gabriella's personal and/or family history.  Final screening recommendations should be made by each individual's managing physician.      Additional Testing Considerations:  Although Gabriella's genetic testing result was inconclusive, other relatives may still carry a gene mutation associated with an increased risk for cancer. Genetic counseling is recommended for close maternal and paternal family members to discuss genetic testing options and recommended screening due to the family history of ovarian cancer.      Summary:  We do not have an explanation for Gabriella's personal or family history of cancer.  Because of that, it is important that she continue with cancer screening based on her personal and family history as discussed above.    Genetic testing is rapidly advancing, and new cancer susceptibility genes will most likely  be identified in the future.  Therefore, I encouraged Gabriella to contact me annually or if there are changes in her personal or family history.  This may change how we assess her cancer risk, screening, and the testing we would offer.    Plan:  1.  A copy of her test results will be mailed to Gabriella.    2. She plans to follow-up with her oncology care team, and has surgery scheduled for next week for her breast cancer treatment.  3. She should contact me annually, or sooner if family history changes.  4. I will contact Gabriella if the lab informs me that this VUS has been reclassified.  This may change screening and testing recommendations for relatives.    If Gabriella has any further questions, I encouraged her to contact me at [phone].    Rhea Zarate MS, [facility]  Licensed Genetic Counselor  [phone]

## 2018-10-16 NOTE — Clinical Note
Please print and send a copy of this letter and the patient's genetic testing report to the patient.    Please enclose test report: SEND OUTS MISCELLANEOUS Send outs misc test [[id number]] (Order [id number])

## 2018-10-16 NOTE — TELEPHONE ENCOUNTER
"10/16/2018    Referring Provider: Kalina Oviedo MD    Presenting Information:  I spoke to Gabriella by phone today to discuss her genetic testing results. Her blood was drawn on 10/5/2018. CancerNext panel testing was ordered  from [facility]. This testing was done because of Gabriella's recent breast cancer diagnosis and family history of ovarian and prostate cancer.  Results for all 34 genes included in the CancerNext panel are available.      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  Medical management decisions should NOT be made based on this test result alone.    Of note, Gabriella tested negative for mutations in the following genes: APC, MERRILL, BARD1, BRCA1, BRCA2, BRIP1, BMPR1A, CDH1, CDK4, CDKN2A, CHEK2, DICER1, EPCAM, HOXB13, GREM1, MLH1, MRE11A, MSH2, MSH6, MUTYH, NBN, NF1, PALB2, PMS2, POLD1, POLE, PTEN, RAD50, RAD51C, RAD51D, SMAD4, SMARCA4, STK11, and TP53 genes.    No mutations were found in any of the 34 genes analyzed.  This test involved sequencing and deletion/duplication analysis of all genes with the exception of EPCAM and GREM1 (deletions only).  We reviewed the autosomal dominant inheritance of these genes.  Gabriella cannot pass on a mutation in any of these genes to her children based on this test result.  Mutations in these genes do not skip generations.      A copy of the test report can be found in the Laboratory tab, dated 10/5/2018, and named \"[facility]\". The report is scanned in as a linked document.     Interpretation:  We discussed several different interpretations of this inconclusive test result.  It is not clear if these specific variants are associated with increased cancer risk.  1. These variant may be benign changes that do not increase cancer " risk.  2. One or both variants may be harmful mutations associated with an increased risk for certain cancers:    Harmful mutations in BRIP1 can cause increased risk for ovarian cancer and possibly breast cancer.  Studies have shown that the risk for ovarian cancer is around 9% (compared to 1-2% in the general population) for individuals with a pathogenic BRIP1 mutation.  The exact risks for breast cancer are not well defined at this time.  If individuals are found to have a mutation in the BRIP1 gene, we would discuss risk reduction surgery that can prevent the development of ovarian cancer.     PALB2 mutations increase the lifetime risk of breast cancer to approximately 33-58% over a woman s lifetime, compared to 12% in the general population.  PALB2 mutations have also been associated with an increased risk for pancreatic and possibly ovarian cancer.  If individuals are found to have a mutation in the PALB2 gene, we would recommend increased breast cancer screening at a younger age.    In rare situations in which both parents have a harmful mutation in the PALB2 or BRIP1 gene, their children each have a 25% risk for Fanconi Anemia (FA).  Fanconi anemia is a rare condition with onset in childhood.  Fanconi anemia often results in physical abnormalities, problems with growth, bone marrow failure, and increased risk for cancer.    If this variant is later classified as harmful, Gabriella would be considered a carrier for FA.  For individuals of childbearing age with PALB2 or BRIP1 mutations, genetic counseling and genetic testing may be advised for their partners.    The lab is working to determine if this variant is harmful or benign, and they will contact me if it is reclassified.  If these variants are determined to be a benign changes, there may be a different gene or combination of genes and environment that are associated with the cancers in Gabriella and/or her relatives.  It is also important to consider that  one of her close relatives, such as her father and/or relatives diagnosed with ovarian cancer, may have had a mutation in one of the genes tested and she did not inherit it.      Inheritance:  We reviewed the autosomal dominant inheritance of these variants in the PALB2 and BRIP1 genes.  We discussed that Gabriella has a 50% chance to pass each variant to each of her children.   Likewise, her sister also has a 50% risk of having the same variant(s).  Because it is unclear what, if any, risk is associated with this variant, clinical genetic testing is not recommended for relatives.  Of note, Gabriella cannot pass on a currently identifiable mutation in any of these 34 genes to her children based on this test result.  Mutations in these genes do not skip generations.     Family Studies:  The laboratory may offer additional research based testing for specific relatives in order to help reclassify this variant.    Screening:  Based on this inconclusive test result, it is important for Gabriella and her relatives to refer back to the family history for appropriate cancer screening.      Gabriella s close female relatives remain at increased risk for breast cancer given their family history.  Breast cancer screening is generally recommended to begin approximately 10 years younger than the earliest age of breast cancer diagnosis in the family, or at age 40, whichever comes first.  In this family, screening may begin at age 40.  Breast screening options should be discussed with an individual's primary care provider and a genetic counselor, to determine what screening is appropriate, or if additional screening (such as breast MRI) is necessary based on personal/family history factors.      Close relatives may remain at slightly increased risk for ovarian cancer due to the family history.  Available ovarian cancer screening may include pelvic exams, CA-125 blood tests, and transvaginal ultrasounds, though there are significant  limitations of this screening.  As such, this screening is not typically recommended.  That being said, close female relatives of her relatives with ovarian cancer should discuss this screening and the signs and symptoms of ovarian cancer with their primary OB/GYN provider, as they may have individualized recommendations.    Other population cancer screening, such as recommendations by the American Cancer Society and the National Comprehensive Cancer Network (NCCN), are also appropriate for Gabriella and her family.  These screening recommendations may change if there are changes to Gabriella's personal and/or family history.  Final screening recommendations should be made by each individual's managing physician.      Additional Testing Considerations:  Although Gabriella's genetic testing result was inconclusive, other relatives may still carry a gene mutation associated with an increased risk for cancer. Genetic counseling is recommended for close maternal and paternal family members to discuss genetic testing options and recommended screening due to the family history of ovarian cancer.      Summary:  We do not have an explanation for Gabriella's personal or family history of cancer.  Because of that, it is important that she continue with cancer screening based on her personal and family history as discussed above.    Genetic testing is rapidly advancing, and new cancer susceptibility genes will most likely be identified in the future.  Therefore, I encouraged Gabriella to contact me annually or if there are changes in her personal or family history.  This may change how we assess her cancer risk, screening, and the testing we would offer.    Plan:  1.  A copy of her test results will be mailed to Gabriella.    2. She plans to follow-up with her oncology care team, and has surgery scheduled for next week for her breast cancer treatment.  3. She should contact me annually, or sooner if family history changes.  4. I  will contact Gabriella if the lab informs me that this VUS has been reclassified.  This may change screening and testing recommendations for relatives.    If Gabriella has any further questions, I encouraged her to contact me at [phone].      Rhea Zarate MS, [facility]  Licensed Genetic Counselor  [phone]

## 2018-10-19 ENCOUNTER — ANESTHESIA EVENT (OUTPATIENT)
Dept: SURGERY | Facility: AMBULATORY SURGERY CENTER | Age: 53
End: 2018-10-19

## 2018-10-19 ENCOUNTER — OFFICE VISIT (OUTPATIENT)
Dept: SURGERY | Facility: CLINIC | Age: 53
End: 2018-10-19
Payer: COMMERCIAL

## 2018-10-19 VITALS
OXYGEN SATURATION: 99 % | TEMPERATURE: 99 F | HEIGHT: 64 IN | SYSTOLIC BLOOD PRESSURE: 132 MMHG | DIASTOLIC BLOOD PRESSURE: 82 MMHG | WEIGHT: 181.6 LBS | BODY MASS INDEX: 31 KG/M2 | HEART RATE: 81 BPM

## 2018-10-19 DIAGNOSIS — Z01.818 PREOP EXAMINATION: Primary | ICD-10-CM

## 2018-10-19 DIAGNOSIS — C50.911 MALIGNANT NEOPLASM OF RIGHT FEMALE BREAST, UNSPECIFIED ESTROGEN RECEPTOR STATUS, UNSPECIFIED SITE OF BREAST (H): ICD-10-CM

## 2018-10-19 DIAGNOSIS — Z01.818 PREOP EXAMINATION: ICD-10-CM

## 2018-10-19 LAB
ANION GAP SERPL CALCULATED.3IONS-SCNC: 7 MMOL/L (ref 3–14)
BUN SERPL-MCNC: 10 MG/DL (ref 7–30)
CALCIUM SERPL-MCNC: 9.1 MG/DL (ref 8.5–10.1)
CHLORIDE SERPL-SCNC: 106 MMOL/L (ref 94–109)
CO2 SERPL-SCNC: 26 MMOL/L (ref 20–32)
CREAT SERPL-MCNC: 0.86 MG/DL (ref 0.52–1.04)
ERYTHROCYTE [DISTWIDTH] IN BLOOD BY AUTOMATED COUNT: 12.2 % (ref 10–15)
GFR SERPL CREATININE-BSD FRML MDRD: 70 ML/MIN/1.7M2
GLUCOSE SERPL-MCNC: 84 MG/DL (ref 70–99)
HCT VFR BLD AUTO: 39.4 % (ref 35–47)
HGB BLD-MCNC: 13.3 G/DL (ref 11.7–15.7)
MCH RBC QN AUTO: 32.6 PG (ref 26.5–33)
MCHC RBC AUTO-ENTMCNC: 33.8 G/DL (ref 31.5–36.5)
MCV RBC AUTO: 97 FL (ref 78–100)
PLATELET # BLD AUTO: 210 10E9/L (ref 150–450)
POTASSIUM SERPL-SCNC: 3.6 MMOL/L (ref 3.4–5.3)
RBC # BLD AUTO: 4.08 10E12/L (ref 3.8–5.2)
SODIUM SERPL-SCNC: 138 MMOL/L (ref 133–144)
WBC # BLD AUTO: 6.8 10E9/L (ref 4–11)

## 2018-10-19 ASSESSMENT — LIFESTYLE VARIABLES: TOBACCO_USE: 0

## 2018-10-19 NOTE — ANESTHESIA PREPROCEDURE EVALUATION
Anesthesia Evaluation     . Pt has had prior anesthetic. Type: General    No history of anesthetic complications          ROS/MED HX    ENT/Pulmonary:     (+)allergic rhinitis, , . Other pulmonary disease chronic cough.   (-) tobacco use   Neurologic:  - neg neurologic ROS     Cardiovascular:  - neg cardiovascular ROS   (+) ----. : . . . :. . No previous cardiac testing       METS/Exercise Tolerance:  >4 METS   Hematologic:  - neg hematologic  ROS       Musculoskeletal:  - neg musculoskeletal ROS       GI/Hepatic:     (+) GERD Other,       Renal/Genitourinary:  - ROS Renal section negative       Endo:     (+) thyroid problem hypothyroidism, .      Psychiatric:  - neg psychiatric ROS       Infectious Disease:  - neg infectious disease ROS       Malignancy:   (+) Malignancy History of Breast  Breast CA Active status post Surgery.         Other:    (+) No chance of pregnancy no H/O Chronic Pain,                   Physical Exam  Normal systems: cardiovascular, pulmonary and dental    Airway   Mallampati: II  TM distance: >3 FB  Neck ROM: full    Dental     Cardiovascular   Rhythm and rate: regular and normal      Pulmonary    breath sounds clear to auscultation               PAC Discussion and Assessment    ASA Classification: 2  Case is suitable for: ASC  Anesthetic techniques and relevant risks discussed: GA with regional block for post-op pain control  Invasive monitoring and risk discussed:   Types:   Possibility and Risk of blood transfusion discussed:   NPO instructions given:   Additional anesthetic preparation and risks discussed:   Needs early admission to pre-op area:   Other:     PAC Resident/NP Anesthesia Assessment:  Gabriella Villarreal is a 53 year old female scheduled for a Right Wire Localized Segmental Mastectomy (Lumpectomy), Right Osborne Lymph Node Biopsy Right Combined,Possible Axillary Lymph Node Dissection on 10/24/2018 by Dr. Oviedo in treatment of breast cancer.  PAC referral for risk assessment and  optimization for anesthesia with comorbid conditions of: allergic rhinitis, chronic cough, GERD and hypothyroidism.     Pre-operative considerations:  1.  Cardiac:  Functional status- METS >4.  She walks 30-60 minutes regularly for exercise.    She has no known cardiac conditions.   Intermediate risk surgery with 0.4% risk of major adverse cardiac event.   2.  Pulm:  Airway feasible.  SIVA risk: low.  She has a chronic cough that she uses albuterol for prn.  She reports that her asthma testing was negative.  3.  GI:  Risk of PONV score = 3.  If > 2, anti-emetic intervention recommended.  She uses ranitidine just prn for GERD symptoms.    4. Other:  She reports that she tends to get anxious and faint feeling with procedures like blood draws, IV sticks, etc but does pretty well if the person doing the task is gentle, goes slowly and explains the task as it is being completed.      VTE risk: 1.8%    Patient is optimized and is acceptable candidate for the proposed procedure.  No further diagnostic evaluation is needed.       **For further details of assessment, testing, and physical exam please see H and P completed on same date.          Elif Jordan DNP, RN, APRN      Reviewed and Signed by [facility] Mid-Level Provider/Resident  Mid-Level Provider/Resident: Elif Jordan DNP, RN, APRN  Date: 10/19/2018  Time: 1732    Attending Anesthesiologist Anesthesia Assessment:  53 year old for wire loc lumpectomy and LN biopsy in management of breast cancer. Active without cardiac or pulmonary disease.    Patient/case discussed with HATTIE. No need to see patient. Patient is appropriate for the planned procedure without further work-up or medical management.      Reviewed and Signed by PAC Anesthesiologist  Anesthesiologist: jose  Date: 10/19/2018  Time:   Pass/Fail: Pass  Disposition:     PAC Pharmacist Assessment:        Pharmacist:   Date:   Time:      Anesthesia Plan      History & Physical Review  History and physical  reviewed and following examination; no interval change.    ASA Status:  2 .    NPO Status:  > 8 hours    Plan for General, LMA and Peripheral Nerve Block with Intravenous induction. Maintenance will be TIVA.    PONV prophylaxis:  Ondansetron (or other 5HT-3) and Other (See comment) (Propofol infusion)      - Standard ASA monitors  - Abx per surgical team          Postoperative Care  Postoperative pain management:  IV analgesics.      Consents  Anesthetic plan, risks, benefits and alternatives discussed with:  Patient.  Use of blood products discussed: No .   .        Yousif Browne MD  Anesthesiology Resident CA3, PGY4                    .

## 2018-10-19 NOTE — MR AVS SNAPSHOT
After Visit Summary   10/19/2018    Gabriella Villarreal    MRN: [id number]           Patient Information     Date Of Birth          1965        Visit Information        Provider Department      10/19/2018 5:00 PM Elif Jordan APRN CNP [facility]        Care Instructions    Preparing for Your Surgery      Name:  Gabriella Villarreal   MRN:  [id number]   :  1965   Today's Date:  10/19/2018     Arriving for surgery:  Surgery date:  10/24/2018  Arrival time:  8:30 am  Please come to:     [facility]  [address] [place] [id number]     Parking is available in front of the [facility] building from 5:30AM to 8:00PM.  -  Proceed to the 5th floor to check into the Ambulatory Surgery Center.              >> There will be patient concierges on the 1st and 5th floor, for assistance or an escort, if you would like.              >> Please call [phone] with any questions.    What can I eat or drink?  -  You may have solid food or milk products until 8 hours prior to your surgery. (midnight)  -  You may have water, apple juice or 7up/Sprite until 2 hours prior to your surgery. ( until 8:30 am arrival time)    Which medicines can I take?        Stop Aspirin, vitamins and supplements one week prior to surgery.      Hold Ibuprofen and Naproxen for 24 hours prior to surgery.       -  Please take these medications the day of surgery:     Levothyroxine      Flonase (fluticansone)     Inhalers as usual and bring to surgery center            How do I prepare myself?  -  Take two showers: one the night before surgery; and one the morning of surgery.         Use Scrubcare or Hibiclens to wash from neck down.  You may use your own  shampoo and conditioner. No other hair products.   -  Do NOT use lotion, powder, deodorant, or antiperspirant the day of your surgery.  -  Do NOT wear any makeup, fingernail polish or jewelry.  -  Do not bring your own medications to the hospital, except for inhalers and eye drops.  -  Bring your ID and insurance card.    Questions or Concerns:    -If you are scheduled at the Ambulatory Surgery Center please call [phone].    -For questions after surgery please call your surgeons office.                     Follow-ups after your visit        Your next 10 appointments already scheduled     Oct 19, 2018  5:00 PM CDT   (Arrive by 4:45 PM)   [facility] ([facility])    [address] [facility] [address] [facility]  4th Floor  [facility] [id number] [phone]            Oct 24, 2018  9:30 AM CDT   NM SENTINEL NODE INJECTION BILATERAL with [email or web address]   [facility] [place], Nuclear Medicine ([facility] [place])    [address] [facility] [id number] [phone]           How do I prepare for my exam? (Food and drink instructions) For Adults:  No Food and Drink Restrictions.  For children: Young children may need medicine to help them relax (called sedation). We will tell you in advance if your child needs this medicine. If so, he or she cannot eat or drink before this test. You will need to arrive about 45 minutes early.  *If your child will not be sedated, he or she can eat and drink as normal.  How do I prepare for my exam? (Other instructions) You may take your normal medicines, unless your doctor tells you not to.  What should I wear: Please wear comfortable clothes.  How long does the exam take: Please allow 90 minutes for this exam.  What should I bring: Please bring a list of your medicines (including vitamins, minerals and over-the-counter drugs). Leave your valuables at home.  Do I need a :  No  is needed.  What do I need to tell my doctor: If you are feeding or may be pregnant, tell us before the exam.  What should I do after the exam: No restrictions, You  may resume normal activities. The radioactive fluid will leave your body when you urinate (use the toilet).  *If your child was sedated, we will bring him or her to the recovery room. It may take up to 90 minutes before your child is ready to go home. We will give you clear guidelines about how to care for your child at home. Be sure to ask any questions you may have.  What is this test:  For these tests, we will inject a small amount of radioactive fluid into your arm or hand (about the amount of radiation you would get in an X-ray). If you are having a GFR, we will test your blood to measure the radioactivity. This tells us how well your kidneys are filtering (cleaning) your blood. If you are having a renogram (kidney scan), we take pictures of the kidneys to see how quickly they can remove the radioactive fluid from your body. This tells us how well your kidneys are working. The fluid helps the kidneys show up on our video screen.  Other information about my exam (For children): We may place a catheter (tube) in the bladder. This tube will drain urine from the body. A parent or other adult may stay with the child in the exam room.  Who should I call with questions: Please call your Imaging Department at your exam site with any questions. Directions, parking instructions, and other information is available on our website, [email or web address].            Oct 24, 2018 10:00 AM CDT   US BREAST WIRE PLACEMENT RIGHT with  Breast Rad, [email or web address],  BREAST NURSE   [facility] ([facility])    [address] [place] [address], 2nd Floor  [facility] [address] [phone]           How do I prepare for my exam? (Food and drink instructions) No Food and Drink Restrictions.  How do I prepare for my exam? (Other instructions) IF YOUR DOCTOR ALSO PRESCRIBED SEDATION DURING THE EXAM (medicine to help you relax): You will receive separate instructions about driving, eating, and additional  tests that may be necessary prior to your exam day.  What should I wear: Wear comfortable clothes.  How long does the exam take: Aspiration (no sedation treatment takes about an hour.  For paracentesis, thoracentesis or sedation plan to spend at least three hours at the hospital.  What should I bring: Bring a list of your medicines, including vitamins, minerals and over-the-counter drugs. It is safest to leave personal items at home.  Do I need a :  No  is needed.  What do I need to tell my doctor: Tell your doctor in advance: * If you are or may be pregnant. * If you are taking Coumadin (or any other blood thinners) 5 days prior to the exam for any special instructions. * If you are diabetic to determine if your insulin needs have to be adjusted for the exam.  What should I do after the exam: Take it easy the rest of the day. You can return to normal activities the next day.  What is this test: This test uses a long, thin needle or tube to remove tissue, fluid, or other cells from your body. Pictures from an ultrasound will guide the needle to the right place. (Ultrasound uses sound waves to create pictures of the body on a video screen. You will not feel the sound waves.)  * A biopsy removes tissue or other cells from the body; we send the tissue or cells to a lab for testing. * Paracentesis removes fluid from the belly (abdomen) to relieve pressure, to test the fluid or both. * Thoracentesis removes fluid from the sac around the lungs to relieve pressure, to test the fluid or both. * Aspiration removes fluid from any part of the body, then the fluid is tested for disease or infection.  Who should I call with questions: If you have any questions, please call the Imaging Department where you will have your exam. Directions, parking instructions, and other information is available on our website, [email or web address].            Oct 24, 2018 10:30 AM CDT   US SENTINAL NODE INJECTION/ID RIGHT with   Breast Rad, [email or web address]   [facility] ([facility])    [address] [place] [address], [address] [place] [id number] [phone]           How do I prepare for my exam? (Food and drink instructions) No Food and Drink Restrictions.  How do I prepare for my exam? (Other instructions) You do not need to do anything special to prepare for your exam.  What should I wear: Wear comfortable clothes.  How long does the exam take: Most ultrasounds take 30 to 60 minutes.  What should I bring: Bring a list of your medicines, including vitamins, minerals and over-the-counter drugs. It is safest to leave personal items at home.  Do I need a :  No  is needed.  What do I need to tell my doctor: Tell your doctor about any allergies you may have.  What should I do after the exam: No restrictions, You may resume normal activities.  What is this test: An ultrasound uses sound waves to make pictures of the body. Sound waves do not cause pain. The only discomfort may be the pressure of the wand against your skin or full bladder.  Who should I call with questions: If you have any questions, please call the Imaging Department where you will have your exam. Directions, parking instructions, and other information is available on our website, [email or web address].            Oct 24, 2018 11:10 AM CDT   MA POST PROCEDURE RIGHT with [email or web address]   [facility] ([facility])    [address] [place] [address], [address] [place] [id number] [phone]           How do I prepare for my exam? (Food and drink instructions) No Food and Drink Restrictions.  How do I prepare for my exam? (Other instructions) Do not use any powder, lotion or deodorant under your arms or on your breast. If you do, we will ask you to remove it before your exam.  What should I wear: Wear comfortable, two-piece clothing.  How long does the exam take: Most scans will take 15 minutes.   "What should I bring: Bring any previous mammograms from other facilities or have them mailed to the breast center.  Do I need a :  No  is needed.  What do I need to tell my doctor: If you have any allergies, tell your care team.  What should I do after the exam: No restrictions, You may resume normal activities.  What is this test: This test is an x-ray of the breast to look for breast disease. The breast is pressed between two plates to flatten and spread the tissue. An X-ray is taken of the breast from different angles.  Who should I call with questions: If you have any questions, please call the Imaging Department where you will have your exam. Directions, parking instructions, and other information is available on our website, [email or web address].  Other information about my exam Three-dimensional (3D) mammograms are available at [facility] locations in [facility], [place], and [place]. [facility] locations include [place] and [facility] Center in [place].  Benefits of 3D mammograms include: * Improved rate of cancer detection * Decreases your chance of having to go back for more tests, which means fewer: * \"False-positive\" results (This means that there is an abnormal area but it isn't cancer.) * Invasive testing procedures, such as a biopsy or surgery * Can provide clearer images of the breast if you have dense breast tissue.  *3D mammography is an optional exam that anyone can have with a 2D mammogram. It doesn't replace or take the place of a 2D mammogram. 2D mammograms remain an effective screening test for all women.  Not all insurance companies cover the cost of a 3D mammogram. Check with your insurance.            Oct 24, 2018   Procedure with Kalina Oviedo MD   [facility] ([facility] [place])    [address] [facility]  5th [facility] [address] [phone]           " Located in the [facility] and [facility] Center at [address] [place] [address], [facility] [address].   parking is very convenient and highly recommended.  is a $6 flat rate fee.  Both  and self parkers should enter the main arrival plaza from [facility]; parking attendants will direct you based on your parking preference.            Oct 24, 2018  2:05 PM CDT   MA BREAST SPECIMEN RIGHT with [email or web address]   [facility] ([facility] [place])    [address] [place] [address], 2nd Floor  [facility] [address] [phone]           How do I prepare for my exam? (Food and drink instructions) No Food and Drink Restrictions.  How do I prepare for my exam? (Other instructions) Do not use any powder, lotion or deodorant under your arms or on your breast. If you do, we will ask you to remove it before your exam.  What should I wear: Wear comfortable, two-piece clothing.  How long does the exam take: Most scans will take 15 minutes.  What should I bring: Bring any previous mammograms from other facilities or have them mailed to the breast center.  Do I need a :  No  is needed.  What do I need to tell my doctor: If you have any allergies, tell your care team.  What should I do after the exam: No restrictions, You may resume normal activities.  What is this test: This test is an x-ray of the breast to look for breast disease. The breast is pressed between two plates to flatten and spread the tissue. An X-ray is taken of the breast from different angles.  Who should I call with questions: If you have any questions, please call the Imaging Department where you will have your exam. Directions, parking instructions, and other information is available on our website, [email or web address].  Other information about my exam Three-dimensional (3D) mammograms are available at [facility] locations in [facility], [place], and [place]. [facility]  "include [place] and the [facility] and [facility] [place] in [place].  Benefits of 3D mammograms include: * Improved rate of cancer detection * Decreases your chance of having to go back for more tests, which means fewer: * \"False-positive\" results (This means that there is an abnormal area but it isn't cancer.) * Invasive testing procedures, such as a biopsy or surgery * Can provide clearer images of the breast if you have dense breast tissue.  *3D mammography is an optional exam that anyone can have with a 2D mammogram. It doesn't replace or take the place of a 2D mammogram. 2D mammograms remain an effective screening test for all women.  Not all insurance companies cover the cost of a 3D mammogram. Check with your insurance.              Who to contact     Please call your clinic at [phone] to:    Ask questions about your health    Make or cancel appointments    Discuss your medicines    Learn about your test results    Speak to your doctor            Additional Information About Your Visit        [employer] Information     [employer] gives you secure access to your electronic health record. If you see a primary care provider, you can also send messages to your care team and make appointments. If you have questions, please call your primary care clinic.  If you do not have a primary care provider, please call [phone] and they will assist you.      [employer] is an electronic gateway that provides easy, online access to your medical records. With [employer], you can request a clinic appointment, read your test results, renew a prescription or communicate with your care team.     To access your existing account, please contact your [facility] or call [phone] for assistance.        Care EveryWhere ID     This is your Care EveryWhere ID. This could be used by other organizations to access your [place] medical records  [id number]        Your Vitals Were     Pulse Temperature " "Height Pulse Oximetry BMI (Body Mass Index)       81 99  F (37.2  C) (Oral) 1.626 m (5' 4\") 99% 31.17 kg/m2        Blood Pressure from Last 3 Encounters:   10/19/18 132/82   09/28/18 120/80    Weight from Last 3 Encounters:   10/19/18 82.4 kg (181 lb 9.6 oz)   09/28/18 77.6 kg (171 lb)              Today, you had the following     No orders found for display       Primary Care Provider Office Phone # Fax #    Anna CHAPIN Renan [phone]       [facility] [address] [facility] [address]        Equal Access to Services     LUIGI BIRD : Aiden Banks, wakelle werner, qamaria cta kaalmada harley, clyde tidwell . So [facility] [phone].    ATENCIÓN: Si habla español, tiene a luna disposición servicios gratuitos de asistencia lingüística. Llame al [phone].    We comply with applicable federal civil rights laws and [place] laws. We do not discriminate on the basis of race, color, national origin, age, disability, sex, sexual orientation, or gender identity.            Thank you!     Thank you for choosing [facility]  for your care. Our goal is always to provide you with excellent care. Hearing back from our patients is one way we can continue to improve our services. Please take a few minutes to complete the written survey that you may receive in the mail after your visit with us. Thank you!             Your Updated Medication List - Protect others around you: Learn how to safely use, store and throw away your medicines at www.disposemymeds.org.          This list is accurate as of 10/19/18  4:51 PM.  Always use your most recent med list.                   Brand Name Dispense Instructions for use Diagnosis    albuterol 108 (90 Base) MCG/ACT inhaler    PROAIR HFA/PROVENTIL HFA/VENTOLIN HFA     Inhale 2-4 puffs into the lungs        chlorpheniramine 4 MG tablet    CHLOR-TRIMETON     Take 4 mg by mouth        diphenhydrAMINE " 25 MG tablet    BENADRYL          fluticasone 50 MCG/ACT spray    FLONASE     INSTILL 2 SPRAYS INTO BOTH NOSTRILS DAILY.        ibuprofen 200 MG tablet    ADVIL/MOTRIN     Take 200 mg by mouth        levothyroxine 100 MCG tablet    SYNTHROID/LEVOTHROID     Take 100 mcg by mouth every morning

## 2018-10-19 NOTE — H&P
Pre-Operative H & P     CC:  Preoperative exam to assess for increased cardiopulmonary risk while undergoing surgery and anesthesia.    Date of Encounter: 10/19/2018  Primary Care Physician:  Anna Urrutia  Associated diagnosis: right breast cancer    HPI  Gabriella Villarreal is a 53 year old female who presents for pre-operative H & P in preparation for a Right Wire Localized Segmental Mastectomy (Lumpectomy), Right [place] Lymph Node Biopsy Right Combined, Possible Axillary Lymph Node Dissection with Dr. Oviedo on 10/24/18 at [facility].     Gabriella Villarreal is a 53 year old female allergic rhinitis, chronic cough, GERD and hypothyroidism that has a recent diagnosis of right breast cancer.  She had a mammogram in September that showed a suspicious lesion.  She had no concerning symptoms prior.  A biopsy was completed and showed invasive ductal carcinoma.  She was subsequently referred to Dr. Oviedo for surgical consultation.  The above listed surgery was recommended for treatment.      History is obtained from the patient and the medical record.     Past Medical History  Past Medical History:   Diagnosis Date     Allergic rhinitis      Chronic cough      GERD (gastroesophageal reflux disease)      Hypothyroidism        Past Surgical History  Past Surgical History:   Procedure Laterality Date     CHOLECYSTECTOMY       L salpingooophorectomy  2015       Hx of Blood transfusions/reactions: none     Hx of abnormal bleeding or anti-platelet use: none    Menstrual history: Patient's last menstrual period was 10/01/2018.:     Steroid use in the last year: none    Personal or FH with difficulty with Anesthesia:  none    Prior to Admission Medications  Current Outpatient Prescriptions   Medication Sig Dispense Refill     albuterol (PROAIR HFA/PROVENTIL HFA/VENTOLIN HFA) 108 (90 Base) MCG/ACT inhaler Inhale 2-4 puffs into the lungs       chlorpheniramine (CHLOR-TRIMETON) 4 MG tablet Take 4 mg by mouth         diphenhydrAMINE (BENADRYL) 25 MG tablet        fluticasone (FLONASE) 50 MCG/ACT spray INSTILL 2 SPRAYS INTO BOTH NOSTRILS DAILY.       ibuprofen (ADVIL/MOTRIN) 200 MG tablet Take 200 mg by mouth       levothyroxine (SYNTHROID/LEVOTHROID) 100 MCG tablet Take 100 mcg by mouth every morning        ranitidine (ZANTAC) 150 MG tablet Take 1 tablet (150 mg) by mouth 2 times daily 60 tablet 1       Allergies  No Known Allergies    Social History  Social History     Social History     Marital status:      Spouse name: N/A     Number of children: 3     Years of education: N/A     Occupational History     self-employed      Social History Main Topics     Smoking status: Never Smoker     Smokeless tobacco: Never Used     Alcohol use Yes      Comment: occasional     Drug use: No     Sexual activity: Not on file     Other Topics Concern     Not on file     Social History Narrative       Family History  Family History   Problem Relation Age of Onset     Myocardial Infarction Mother      Coronary Artery Disease Mother      Arrhythmia Father      Prostate Cancer Father      Alcoholism Father      Alcoholism Sister      Diabetes Maternal Grandmother      No Known Problems Maternal Grandfather      No Known Problems Paternal Grandmother      HEART DISEASE Paternal Grandfather                  ROS/MED HX  The complete review of systems is negative other than noted in the HPI or here.   ENT/Pulmonary:     (+)allergic rhinitis, , . Other pulmonary disease chronic cough.   (-) tobacco use   Neurologic:  - neg neurologic ROS     Cardiovascular:  - neg cardiovascular ROS   (+) ----. : . . . :. . No previous cardiac testing       METS/Exercise Tolerance:  >4 METS   Hematologic:  - neg hematologic  ROS       Musculoskeletal:  - neg musculoskeletal ROS       GI/Hepatic:     (+) GERD Other,       Renal/Genitourinary:  - ROS Renal section negative       Endo:     (+) thyroid problem hypothyroidism, .      Psychiatric:  - neg psychiatric  "ROS       Infectious Disease:  - neg infectious disease ROS       Malignancy:   (+) Malignancy History of Breast  Breast CA Active status post Surgery.         Other:    (+) No chance of pregnancy no H/O Chronic Pain,               Temp: 99  F (37.2  C) Temp src: Oral BP: 132/82 Pulse: 81     SpO2: 99 %         181 lbs 9.6 oz  5' 4\"   Body mass index is 31.17 kg/(m^2).       Physical Exam  Constitutional: Awake, alert, cooperative, no apparent distress, and appears stated age.  Eyes: Pupils equal, round and reactive to light, extra ocular muscles intact, sclera clear, conjunctiva normal.  HENT: Normocephalic, oral pharynx with moist mucus membranes, good dentition. No goiter appreciated.   Respiratory: Clear to auscultation bilaterally, no crackles or wheezing.  Cardiovascular: Regular rate and rhythm, normal S1 and S2, and no murmur noted.  Carotids +2, no bruits. No edema. Palpable pulses to radial  DP and PT arteries.   GI: Normal bowel sounds, soft, non-distended, non-tender, no masses palpated, no hepatosplenomegaly.  Lymph/Hematologic: No cervical lymphadenopathy and no supraclavicular lymphadenopathy.  Genitourinary:  deferred  Skin: Warm and dry.  No rashes at anticipated surgical site.   Musculoskeletal: Full ROM of neck. There is no redness, warmth, or swelling of the exposed joints. Gross motor strength is normal.    Neurologic: Awake, alert, oriented to name, place and time. Cranial nerves II-XII are grossly intact. Gait is normal.   Neuropsychiatric: Calm, cooperative. Normal affect.     Labs: (personally reviewed)   Component      Latest Ref Rng & Units 10/19/2018   Sodium      133 - 144 mmol/L 138   Potassium      3.4 - 5.3 mmol/L 3.6   Chloride      94 - 109 mmol/L 106   Carbon Dioxide      20 - 32 mmol/L 26   Anion Gap      3 - 14 mmol/L 7   Glucose      70 - 99 mg/dL 84   Urea Nitrogen      7 - 30 mg/dL 10   Creatinine      0.52 - 1.04 mg/dL 0.86   GFR Estimate      >60 mL/min/1.7m2 70   GFR " Estimate If Black      >60 mL/min/1.7m2 84   Calcium      8.5 - 10.1 mg/dL 9.1   WBC      4.0 - 11.0 10e9/L 6.8   RBC Count      3.8 - 5.2 10e12/L 4.08   Hemoglobin      11.7 - 15.7 g/dL 13.3   Hematocrit      35.0 - 47.0 % 39.4   MCV      78 - 100 fl 97   MCH      26.5 - 33.0 pg 32.6   MCHC      31.5 - 36.5 g/dL 33.8   RDW      10.0 - 15.0 % 12.2   Platelet Count      150 - 450 10e9/L 210         Outside records reviewed from: Care Everywhere        ASSESSMENT and PLAN  Gabriella Villarreal is a 53 year old female scheduled for a Right Wire Localized Segmental Mastectomy (Lumpectomy), Right [place] Lymph Node Biopsy Right Combined,Possible Axillary Lymph Node Dissection on 10/24/2018 by Dr. Oviedo in treatment of breast cancer.  PAC referral for risk assessment and optimization for anesthesia with comorbid conditions of: allergic rhinitis, chronic cough, GERD and hypothyroidism.     Pre-operative considerations:  1.  Cardiac:  Functional status- METS >4.  She walks 30-60 minutes regularly for exercise.    She has no known cardiac conditions.   Intermediate risk surgery with 0.4% risk of major adverse cardiac event.   2.  Pulm:  Airway feasible.  SIVA risk: low.  She has a chronic cough that she uses albuterol for prn.  She reports that her asthma testing was negative.  3.  GI:  Risk of PONV score = 3.  If > 2, anti-emetic intervention recommended.  She uses ranitidine just prn for GERD symptoms.    4. Other:  She reports that she tends to get anxious and faint feeling with procedures like blood draws, IV sticks, etc but does pretty well if the person doing the task is gentle, goes slowly and explains the task as it is being completed.      VTE risk: 1.8%    Patient is optimized and is acceptable candidate for the proposed procedure.  No further diagnostic evaluation is needed.                 Elif Jordan DNP, RN, APRN  Preoperative Assessment Center  [facility]  Phone:  [phone]  Fax: [phone]

## 2018-10-24 ENCOUNTER — SURGERY (OUTPATIENT)
Age: 53
End: 2018-10-24

## 2018-10-24 ENCOUNTER — RADIANT APPOINTMENT (OUTPATIENT)
Dept: MAMMOGRAPHY | Facility: CLINIC | Age: 53
End: 2018-10-24
Attending: SURGERY
Payer: COMMERCIAL

## 2018-10-24 ENCOUNTER — HOSPITAL ENCOUNTER (OUTPATIENT)
Facility: AMBULATORY SURGERY CENTER | Age: 53
End: 2018-10-24
Attending: SURGERY
Payer: COMMERCIAL

## 2018-10-24 ENCOUNTER — ANESTHESIA (OUTPATIENT)
Dept: SURGERY | Facility: AMBULATORY SURGERY CENTER | Age: 53
End: 2018-10-24

## 2018-10-24 ENCOUNTER — TRANSFERRED RECORDS (OUTPATIENT)
Dept: HEALTH INFORMATION MANAGEMENT | Facility: CLINIC | Age: 53
End: 2018-10-24

## 2018-10-24 ENCOUNTER — HOSPITAL ENCOUNTER (OUTPATIENT)
Dept: NUCLEAR MEDICINE | Facility: CLINIC | Age: 53
Setting detail: NUCLEAR MEDICINE
Discharge: HOME | End: 2018-10-24
Attending: SURGERY | Admitting: SURGERY
Payer: COMMERCIAL

## 2018-10-24 VITALS
SYSTOLIC BLOOD PRESSURE: 125 MMHG | HEART RATE: 95 BPM | TEMPERATURE: 99.6 F | BODY MASS INDEX: 31.05 KG/M2 | DIASTOLIC BLOOD PRESSURE: 81 MMHG | OXYGEN SATURATION: 95 % | WEIGHT: 181.9 LBS | HEIGHT: 64 IN | RESPIRATION RATE: 16 BRPM

## 2018-10-24 DIAGNOSIS — C50.411 MALIGNANT NEOPLASM OF UPPER-OUTER QUADRANT OF RIGHT BREAST IN FEMALE, ESTROGEN RECEPTOR POSITIVE (H): ICD-10-CM

## 2018-10-24 DIAGNOSIS — Z17.0 MALIGNANT NEOPLASM OF UPPER-OUTER QUADRANT OF RIGHT BREAST IN FEMALE, ESTROGEN RECEPTOR POSITIVE (H): ICD-10-CM

## 2018-10-24 DIAGNOSIS — C50.511 MALIGNANT NEOPLASM OF LOWER-OUTER QUADRANT OF RIGHT BREAST OF FEMALE, ESTROGEN RECEPTOR POSITIVE (H): Primary | ICD-10-CM

## 2018-10-24 DIAGNOSIS — Z17.0 MALIGNANT NEOPLASM OF LOWER-OUTER QUADRANT OF RIGHT BREAST OF FEMALE, ESTROGEN RECEPTOR POSITIVE (H): Primary | ICD-10-CM

## 2018-10-24 LAB
HCG UR QL: NEGATIVE
INTERNAL QC OK POCT: YES

## 2018-10-24 PROCEDURE — 38792 RA TRACER ID OF SENTINL NODE: CPT

## 2018-10-24 RX ORDER — FENTANYL CITRATE 50 UG/ML
25-50 INJECTION, SOLUTION INTRAMUSCULAR; INTRAVENOUS
Status: DISCONTINUED | OUTPATIENT
Start: 2018-10-24 | End: 2018-10-24 | Stop reason: HOSPADM

## 2018-10-24 RX ORDER — PROPOFOL 10 MG/ML
INJECTION, EMULSION INTRAVENOUS PRN
Status: DISCONTINUED | OUTPATIENT
Start: 2018-10-24 | End: 2018-10-24

## 2018-10-24 RX ORDER — FLUMAZENIL 0.1 MG/ML
0.2 INJECTION, SOLUTION INTRAVENOUS
Status: DISCONTINUED | OUTPATIENT
Start: 2018-10-24 | End: 2018-10-24 | Stop reason: HOSPADM

## 2018-10-24 RX ORDER — DEXAMETHASONE SODIUM PHOSPHATE 4 MG/ML
INJECTION, SOLUTION INTRA-ARTICULAR; INTRALESIONAL; INTRAMUSCULAR; INTRAVENOUS; SOFT TISSUE PRN
Status: DISCONTINUED | OUTPATIENT
Start: 2018-10-24 | End: 2018-10-24

## 2018-10-24 RX ORDER — OXYCODONE HYDROCHLORIDE 5 MG/1
5 TABLET ORAL EVERY 4 HOURS PRN
Status: DISCONTINUED | OUTPATIENT
Start: 2018-10-24 | End: 2018-10-25 | Stop reason: HOSPADM

## 2018-10-24 RX ORDER — SODIUM CHLORIDE, SODIUM LACTATE, POTASSIUM CHLORIDE, CALCIUM CHLORIDE 600; 310; 30; 20 MG/100ML; MG/100ML; MG/100ML; MG/100ML
INJECTION, SOLUTION INTRAVENOUS CONTINUOUS
Status: DISCONTINUED | OUTPATIENT
Start: 2018-10-24 | End: 2018-10-25 | Stop reason: HOSPADM

## 2018-10-24 RX ORDER — CEFAZOLIN SODIUM 1 G/50ML
1 SOLUTION INTRAVENOUS SEE ADMIN INSTRUCTIONS
Status: DISCONTINUED | OUTPATIENT
Start: 2018-10-24 | End: 2018-10-24 | Stop reason: HOSPADM

## 2018-10-24 RX ORDER — LIDOCAINE 40 MG/G
CREAM TOPICAL
Status: DISCONTINUED | OUTPATIENT
Start: 2018-10-24 | End: 2018-10-24 | Stop reason: HOSPADM

## 2018-10-24 RX ORDER — GABAPENTIN 300 MG/1
300 CAPSULE ORAL ONCE
Status: COMPLETED | OUTPATIENT
Start: 2018-10-24 | End: 2018-10-24

## 2018-10-24 RX ORDER — BUPIVACAINE HYDROCHLORIDE 2.5 MG/ML
INJECTION, SOLUTION EPIDURAL; INFILTRATION; INTRACAUDAL PRN
Status: DISCONTINUED | OUTPATIENT
Start: 2018-10-24 | End: 2018-10-24

## 2018-10-24 RX ORDER — FENTANYL CITRATE 50 UG/ML
INJECTION, SOLUTION INTRAMUSCULAR; INTRAVENOUS PRN
Status: DISCONTINUED | OUTPATIENT
Start: 2018-10-24 | End: 2018-10-24

## 2018-10-24 RX ORDER — OXYCODONE HYDROCHLORIDE 5 MG/1
5-10 TABLET ORAL EVERY 4 HOURS PRN
Qty: 6 TABLET | Refills: 0 | Status: SHIPPED | OUTPATIENT
Start: 2018-10-24 | End: 2019-01-28

## 2018-10-24 RX ORDER — AMOXICILLIN 250 MG
1-2 CAPSULE ORAL 2 TIMES DAILY
Qty: 30 TABLET | Refills: 0 | Status: SHIPPED | OUTPATIENT
Start: 2018-10-24 | End: 2019-01-28

## 2018-10-24 RX ORDER — MEPERIDINE HYDROCHLORIDE 25 MG/ML
12.5 INJECTION INTRAMUSCULAR; INTRAVENOUS; SUBCUTANEOUS
Status: DISCONTINUED | OUTPATIENT
Start: 2018-10-24 | End: 2018-10-25 | Stop reason: HOSPADM

## 2018-10-24 RX ORDER — SODIUM CHLORIDE, SODIUM LACTATE, POTASSIUM CHLORIDE, CALCIUM CHLORIDE 600; 310; 30; 20 MG/100ML; MG/100ML; MG/100ML; MG/100ML
INJECTION, SOLUTION INTRAVENOUS CONTINUOUS
Status: DISCONTINUED | OUTPATIENT
Start: 2018-10-24 | End: 2018-10-24 | Stop reason: HOSPADM

## 2018-10-24 RX ORDER — NALOXONE HYDROCHLORIDE 0.4 MG/ML
.1-.4 INJECTION, SOLUTION INTRAMUSCULAR; INTRAVENOUS; SUBCUTANEOUS
Status: DISCONTINUED | OUTPATIENT
Start: 2018-10-24 | End: 2018-10-24 | Stop reason: HOSPADM

## 2018-10-24 RX ORDER — ONDANSETRON 2 MG/ML
INJECTION INTRAMUSCULAR; INTRAVENOUS PRN
Status: DISCONTINUED | OUTPATIENT
Start: 2018-10-24 | End: 2018-10-24

## 2018-10-24 RX ORDER — ISOSULFAN BLUE 50 MG/5ML
INJECTION, SOLUTION SUBCUTANEOUS PRN
Status: DISCONTINUED | OUTPATIENT
Start: 2018-10-24 | End: 2018-10-24 | Stop reason: HOSPADM

## 2018-10-24 RX ORDER — GLYCOPYRROLATE 0.2 MG/ML
INJECTION, SOLUTION INTRAMUSCULAR; INTRAVENOUS PRN
Status: DISCONTINUED | OUTPATIENT
Start: 2018-10-24 | End: 2018-10-24

## 2018-10-24 RX ORDER — NALOXONE HYDROCHLORIDE 0.4 MG/ML
.1-.4 INJECTION, SOLUTION INTRAMUSCULAR; INTRAVENOUS; SUBCUTANEOUS
Status: DISCONTINUED | OUTPATIENT
Start: 2018-10-24 | End: 2018-10-25 | Stop reason: HOSPADM

## 2018-10-24 RX ORDER — BUPIVACAINE HYDROCHLORIDE 2.5 MG/ML
10 INJECTION, SOLUTION EPIDURAL; INFILTRATION; INTRACAUDAL ONCE
Status: COMPLETED | OUTPATIENT
Start: 2018-10-24 | End: 2018-10-24

## 2018-10-24 RX ORDER — ONDANSETRON 4 MG/1
4 TABLET, ORALLY DISINTEGRATING ORAL EVERY 30 MIN PRN
Status: DISCONTINUED | OUTPATIENT
Start: 2018-10-24 | End: 2018-10-25 | Stop reason: HOSPADM

## 2018-10-24 RX ORDER — ONDANSETRON 2 MG/ML
4 INJECTION INTRAMUSCULAR; INTRAVENOUS EVERY 30 MIN PRN
Status: DISCONTINUED | OUTPATIENT
Start: 2018-10-24 | End: 2018-10-25 | Stop reason: HOSPADM

## 2018-10-24 RX ORDER — PROPOFOL 10 MG/ML
INJECTION, EMULSION INTRAVENOUS CONTINUOUS PRN
Status: DISCONTINUED | OUTPATIENT
Start: 2018-10-24 | End: 2018-10-24

## 2018-10-24 RX ORDER — ACETAMINOPHEN 325 MG/1
975 TABLET ORAL ONCE
Status: DISCONTINUED | OUTPATIENT
Start: 2018-10-24 | End: 2018-10-24 | Stop reason: HOSPADM

## 2018-10-24 RX ORDER — ACETAMINOPHEN 325 MG/1
975 TABLET ORAL ONCE
Status: COMPLETED | OUTPATIENT
Start: 2018-10-24 | End: 2018-10-24

## 2018-10-24 RX ORDER — CEFAZOLIN SODIUM 2 G/50ML
2 SOLUTION INTRAVENOUS
Status: COMPLETED | OUTPATIENT
Start: 2018-10-24 | End: 2018-10-24

## 2018-10-24 RX ADMIN — FENTANYL CITRATE 50 MCG: 50 INJECTION, SOLUTION INTRAMUSCULAR; INTRAVENOUS at 13:10

## 2018-10-24 RX ADMIN — BUPIVACAINE HYDROCHLORIDE 25 MG: 2.5 INJECTION, SOLUTION EPIDURAL; INFILTRATION; INTRACAUDAL at 11:29

## 2018-10-24 RX ADMIN — ONDANSETRON 4 MG: 2 INJECTION INTRAMUSCULAR; INTRAVENOUS at 13:50

## 2018-10-24 RX ADMIN — FENTANYL CITRATE 50 MCG: 50 INJECTION, SOLUTION INTRAMUSCULAR; INTRAVENOUS at 12:26

## 2018-10-24 RX ADMIN — PROPOFOL 150 MCG/KG/MIN: 10 INJECTION, EMULSION INTRAVENOUS at 12:11

## 2018-10-24 RX ADMIN — PROPOFOL 200 MG: 10 INJECTION, EMULSION INTRAVENOUS at 12:11

## 2018-10-24 RX ADMIN — GLYCOPYRROLATE 0.2 MG: 0.2 INJECTION, SOLUTION INTRAMUSCULAR; INTRAVENOUS at 12:09

## 2018-10-24 RX ADMIN — PROPOFOL: 10 INJECTION, EMULSION INTRAVENOUS at 12:51

## 2018-10-24 RX ADMIN — DEXAMETHASONE SODIUM PHOSPHATE 4 MG: 4 INJECTION, SOLUTION INTRA-ARTICULAR; INTRALESIONAL; INTRAMUSCULAR; INTRAVENOUS; SOFT TISSUE at 12:22

## 2018-10-24 RX ADMIN — GABAPENTIN 300 MG: 300 CAPSULE ORAL at 09:41

## 2018-10-24 RX ADMIN — FENTANYL CITRATE 50 MCG: 50 INJECTION, SOLUTION INTRAMUSCULAR; INTRAVENOUS at 12:40

## 2018-10-24 RX ADMIN — OXYCODONE HYDROCHLORIDE 5 MG: 5 TABLET ORAL at 15:22

## 2018-10-24 RX ADMIN — ISOSULFAN BLUE 2 ML: 50 INJECTION, SOLUTION SUBCUTANEOUS at 12:28

## 2018-10-24 RX ADMIN — FENTANYL CITRATE 50 MCG: 50 INJECTION, SOLUTION INTRAMUSCULAR; INTRAVENOUS at 11:57

## 2018-10-24 RX ADMIN — FENTANYL CITRATE 50 MCG: 50 INJECTION, SOLUTION INTRAMUSCULAR; INTRAVENOUS at 14:10

## 2018-10-24 RX ADMIN — CEFAZOLIN SODIUM 2 G: 2 SOLUTION INTRAVENOUS at 12:06

## 2018-10-24 RX ADMIN — SODIUM CHLORIDE, SODIUM LACTATE, POTASSIUM CHLORIDE, CALCIUM CHLORIDE: 600; 310; 30; 20 INJECTION, SOLUTION INTRAVENOUS at 12:06

## 2018-10-24 RX ADMIN — ACETAMINOPHEN 975 MG: 325 TABLET ORAL at 09:41

## 2018-10-24 RX ADMIN — BUPIVACAINE HYDROCHLORIDE 10 ML: 2.5 INJECTION, SOLUTION EPIDURAL; INFILTRATION; INTRACAUDAL at 12:01

## 2018-10-24 NOTE — ANESTHESIA PROCEDURE NOTES
Peripheral Nerve Block Procedure Note    Staff:     Anesthesiologist:  LASHELL DE LEÓN    Resident/CRNA:  AIDEN CARRERA    Block performed by resident/CRNA in the presence of a teaching physician    Location: Pre-op  Procedure Start/Stop TImes:      10/24/2018 12:00 PM     10/24/2018 12:10 PM    patient identified, IV checked, site marked, risks and benefits discussed, informed consent, monitors and equipment checked, pre-op evaluation, at physician/surgeon's request and post-op pain management      Correct Patient: Yes      Correct Position: Yes      Correct Site: Yes      Correct Procedure: Yes      Correct Laterality:  Yes    Site Marked:  Yes  Procedure details:     Procedure:  Pectoralis    ASA:  2    Laterality:  Right    Position:  Sitting    Sterile Prep: chloraprep      Needle:  Insulated    Needle gauge:  21    Needle length (mm):  110    Ultrasound: Yes      Ultrasound used to identify targeted nerve, plexus, or vascular structure and placed a needle adjacent to it      Permanent Image entered into patiient's record      Abnormal pain on injection: No      Blood Aspirated: No      Paresthesias:  No    Bleeding at site: No      Test dose negative for signs of intravascular injection: Yes      Bolus via:  Needle    Infusion Method:  Single Shot    Complications:  None  Assessment/Narrative:     Injection made incrementally with aspirations every (mL):  5

## 2018-10-24 NOTE — ANESTHESIA POSTPROCEDURE EVALUATION
"Anesthesia POST Procedure Evaluation    Patient: Gabriella Villarreal   MRN:     [id number] Gender:   female   Age:    53 year old :      1965        Preoperative Diagnosis: Breast Cancer   Procedure(s):  Right Wire Localized Segmental Mastectomy (Lumpectomy), Right [place] Lymph Node Biopsy   Postop Comments: No value filed.       Anesthesia Type:  Not documented    Reportable Event: NO     PAIN: Uncomplicated   Sign Out status: Comfortable, Well controlled pain     PONV: No PONV   Sign Out status:  No Nausea or Vomiting     Neuro/Psych: Uneventful perioperative course   Sign Out Status: Preoperative baseline; Age appropriate mentation     Airway/Resp.: Uneventful perioperative course   Sign Out Status: Non labored breathing, age appropriate RR; Resp. Status within EXPECTED Parameters     CV: Uneventful perioperative course   Sign Out status: Appropriate BP and perfusion indices; Appropriate HR/Rhythm     Disposition:   Sign Out in:  PACU  Disposition:  Phase II; Home  Recovery Course: Uneventful  Follow-Up: Not required     Comments/Narrative:  Pt reports \"numbness\" in all her right fingers. Palms and rest of hand and arm feels normal. On exam no decrease in sensation in any of her fingers or hands. No weakness on exam. Discussed this is likely not related to block. The sensation she reports in not in any specific nerve distribution. Patient will continue to monitor and call or followup if the sensation does not resolve or worsens.     Thien Collaod MD           Last Anesthesia Record Vitals:  CRNA VITALS  10/24/2018 1421 - 10/24/2018 1521      10/24/2018             Pulse: 99    SpO2: 98 %    Resp Rate (observed): 17          Last PACU/Preop Vitals:  Vitals:    10/24/18 1500 10/24/18 1506 10/24/18 1530   BP: 119/77 130/86 125/81   Pulse:   95   Resp: 16 16 16   Temp:  37.8  C (100  F) 37.6  C (99.6  F)   SpO2: 96% 95% 95%         Electronically Signed By: Thien Collado MD, 2018, 4:01 PM  "

## 2018-10-24 NOTE — PROGRESS NOTES
SBAR Wire Localization     SITUATION:  Patient to breast imaging center for imaging guided wire localizations before breast lumpectomy or excision biopsy with sentinel node injection.    BACKGROUND:  Breast imaging cancer, breast abnormality  Ordered procedure completed: Yes  Special needs identified: Yes     ASSESSMENT:  SBAR report called to patient care unit because of unexpected event in radiology: No  Allergies and medication list reviewed prior to procedure. Yes  Skin cleansed with ChloraPrep One-Step.  Anesthesia: approximately 9ml of 0.25% Bupivicaine injection subcutaneous before wire insertion administered by the radiologist.   Gauze dressing over insertion site(s).  Post procedure mammogram completed: Yes    Patient tolerance: Patient was emotional during procedure but tolerated it well    RECOMMENDATIONS:  Patient transferred to Same Day Surgery in stable condition via wheelchair with Breast Imaging Staff.  Copy of note given to patient and instructions to hand this note to surgery staff.    Please call Breast Center at [facility] [phone] if there are any questions.

## 2018-10-24 NOTE — OP NOTE
DATE OF SURGERY: October 24, 2018     SURGEON: Kalina Oviedo MD  ASSISTANT: Jesus Blas MD PGY-7    PREOPERATIVE DIAGNOSIS: Right breast cancer, lower outer quadrant  POSTOPERATIVE DIAGNOSIS: same    PROCEDURE:   1. Right axillary sentinel lymph node mapping and biopsy  2. Right wire-localized segmental mastectomy    ANESTHESIA: General + Block    CLINICAL NOTE:  Gabriella Villarreal is a 53 year old woman with right breast cancer. Two foci of cancer were seen; two localizing wires were placed.  The risks and benefits of segmental mastectomy and sentinel lymph node biopsy were discussed with the patient and she elected to proceed with informed consent.    OPERATIVE FINDINGS:  1. One sentinel lymph node removed.  2. Specimen radiograph confirmed presence of the wires x2 and biopsy clip within the specimen.   3. The inferior margin of the main specimen felt very close; thus an additional inferior margin was removed.    OPERATIVE PROCEDURE:  The patient first presented to the Breast Center for the wire-localization and subareolar technetium-labelled sulfur colloid injection by the radiologist.  The wire-localization images were personally reviewed by me prior to the start of the procedure.   The patient was brought to the operating room and placed in the supine position with appropriate padding for all of the pressure points. A general anesthetic was administered.   A surgical safety checklist was performed to confirm the patient's identity, the site and laterality of the procedure. Perioperative antibiotics (Ancef) was provided.  VTE prophylaxis was provided with serial compression devices.  2 mL of lymphazurin was injected into the right  subareolar space and the right  breast was massaged for 5 minutes.   The right breast and axilla was then prepped and draped in the usual sterile fashion using Chloraprep.     We began with the axilla.  An incision was made just below the hair-bearing area of the right axilla.  The  clavipectoral fascia was incised to enter the axilla. The Neoprobe was used to identify the sentinel lymph nodes.  [place] lymph node #1 was blue and had [place] [id number]. The background count was 144. No other blue or palpable lymph nodes were found.  Thus no additional sentinel lymph nodes were sought.  All of the lymph nodes were sent to pathology individually.  The wound was irrigated and hemostasis was achieved.  The incision was closed in two layers with interrupted 3-0 vicryl and a running subcuticular 4-0 monocryl.     Next, A radial incision was made in the lower outer quadrant of the right breast. Superficial skin flaps were raised.  The breast tissue denoted by the localizing wires was dissected out.  The dissection was carried out down to the pre-pectoral fat. The specimen was inked and radiographed.  It was found to contain the biopsy clip and wires x2.  The specimen was then sent to pathology.  I felt the cancer was close to the inferior margin.  Thus an additional inferior margin was taken.  It was inked and sent to pathology.  The wound was irrigated and hemostasis was achieved.  The cavity now measures approximately 5 x 6 cm.  Hemoclips were placed to nicole the edges of the cavity: medial, inferior, lateral, and superior.   I then raised inferolateral superficial and prepectoral flaps to mobilize the remaining breast tissue into the cavity to fill it and avoid a divot.  The wound was irrigated and hemostasis was achieved.  The mobilized flaps were inset into the cavity and sutured in place with 3-0 vicryl. The incision was closed in two layers with interrupted 3-0 vicryl and a running subcuticular 4-0 monocryl.  Prineo was applied to both incisions.   The patient tolerated the procedure well. The sponge, needle, instrument counts were correct.  The patient was then awakened and taken to recovery in stable fashion.        I was present and scrubbed for the entire above procedure.    EBL: 20  mL    SPECIMEN(S):  1. Right axillary sentinel lymph node # 1  2. Right breast mass  3. Right breast, new inferior margin    POSTOPERATIVE PLANS:  Gabriella Villarreal will be discharged home today with wound care instructions and a prescription for analgesics.  She will follow-up with me in 2 weeks.     Kalina Oviedo MD MSc [facility] FACS    Division of Surgical Oncology  [facility]

## 2018-10-24 NOTE — IP AVS SNAPSHOT
[facility]    [address] [place] [id number]    Phone:  [phone]    Fax:  [phone]                                       After Visit Summary   10/24/2018    Gabriella Villarreal    MRN: [id number]           After Visit Summary Signature Page     I have received my discharge instructions, and my questions have been answered. I have discussed any challenges I see with this plan with the nurse or doctor.    ..........................................................................................................................................  Patient/Patient Representative Signature      ..........................................................................................................................................  Patient Representative Print Name and Relationship to Patient    ..................................................               ................................................  Date                                   Time    ..........................................................................................................................................  Reviewed by Signature/Title    ...................................................              ..............................................  Date                                               Time          22EPIC Rev 08/18

## 2018-10-24 NOTE — DISCHARGE INSTRUCTIONS
"[facility]  Home Care Following Anesthesia  For 24 hours after surgery:  1. Get plenty of rest.  A responsible adult must stay with you for at least 24 hours after you leave the surgery center.  2. Do not drive or use heavy equipment.  If you have weakness or tingling, don't drive or use heavy equipment until this feeling goes away.   3. Do not drink alcohol.   4. Avoid strenuous or risky activities.  Ask for help when climbing stairs.  5. You may feel lightheaded.  IF so, sit for a few minutes before standing.  Have someone help you get up.   6. If you have nausea (feel sick to your stomach): Drink only clear liquids such as apple juice, ginger ale, broth or 7-Up.  Rest may also help.  Be sure to drink enough fluids.  Move to a regular diet as you feel able.   7. You may have a slight fever.  Call the doctor if your fever is over 100 F (37.7 C) (taken under the tongue) or lasts longer than 24 hours.  8. You may have a dry mouth, a sore throat, muscle aches or trouble sleeping. These should go away after 24 hours.  9. Do not make important or legal decisions.   Today you received a Marcaine or bupivacaine block to numb the nerves near your surgery site.  This is a block using local anesthetic or \"numbing\" medication injected around the nerves to anesthetize or \"numb\" the area supplied by those nerves.  This block is injected into the muscle layer near your surgical site.  The medication may numb the location where you had surgery for 6-18 hours, but may last up to 24 hours.  If your surgical site is an arm or leg you should be careful with your affected limb, since it is possible to injure your limb without being aware of it due to the numbing.  Until full feeling returns, you should guard against bumping or hitting your limb, and avoid extreme hot or cold temperatures on the skin.  As the block wears off, the feeling will return as a tingling or prickly sensation near your " surgical site.  You will experience more discomfort from your incision as the feeling returns.  You may want to take a pain pill (a narcotic or Tylenol if this was prescribed by your surgeon) when you start to experience mild pain before the pain beccomes more severe.  If your pain medications do not control your pain you should notifiy your surgeon.    Tips for taking pain medications  To get the best pain relief possible, remember these points:    Take pain medications as directed, before pain becomes severe.    Pain medication can upset your stomach: taking it with food may help.    Constipation is a common side effect of pain medication. Drink plenty of  fluids.    Eat foods high in fiber. Take a stool softener if recommended by your doctor or pharmacist.    Do not drink alcohol, drive or operate machinery while taking pain medications.    Ask about other ways to control pain, such as with heat, ice or relaxation.    Tylenol/Acetaminophen Consumption  To help encourage the safe use of acetaminophen, the makers of TYLENOL  have lowered the maximum daily dose for single-ingredient Extra Strength TYLENOL  (acetaminophen) products sold in the [place] from 8 pills per day (4,000 mg) to 6 pills per day (3,000 mg). The dosing interval has also changed from 2 pills every 4-6 hours to 2 pills every 6 hours.    If you feel your pain relief is insufficient, you may take Tylenol/Acetaminophen in addition to your narcotic pain medication.     Be careful not to exceed 3,000 mg of Tylenol/Acetaminophen in a 24 hour period from all sources.    If you are taking extra strength Tylenol/acetaminophen (500 mg), the maximum dose is 6 tablets in 24 hours.    If you are taking regular strength acetaminophen (325 mg), the maximum dose is 9 tablets in 24 hours.    Call a doctor for any of the followin. Signs of infection (fever, growing tenderness at the surgery site, a large amount of drainage or bleeding, severe pain, foul-smelling  "drainage, redness, swelling).  2. It has been over 8 to 10 hours since surgery and you are still not able to urinate (pass water).  3. Headache for over 24 hours.  Your doctor is:  Dr. Kalina Oviedo, Breast Center: [phone]  Or dial [phone] and ask for the resident on call for:  [facility]  For emergency care, call the:  [place] [facility]:  [phone] (TTY for hearing impaired: [phone])    Information about liposomal bupivacaine (Exparel)    What is Liposomal Bupivacaine?    Liposomal Bupivacaine is a numbing medication that can help you manage your pain after surgery.  This medication is similar to \"novacaine,\" which is often used by the dentist.  Liposomal bupivacaine is released slowly and can help control pain for up to 72 hours.    What is the purpose of Liposomal Bupivacaine?    To manage your pain after surgery    To help you sleep better, take deep breaths, walk more comfortable, and feel up to visiting with others    How is the procedure done?    Liposomal bupivacaine is a medication given by an injection.    It is usually given right before your surgery.  If this is the case, you will be awake or sedated, but you should experience minimal pain during the procedure.    For some people, the injection may be given at the very end of your surgery.  It all depends on the type of surgery and your situation.    The procedure usually takes about 5-15 minutes.  An ultrasound machine will help the anesthesiologist insert it in the right place or the surgeon will inject it under direct vision.     A needle is used to place the numbing medication under your skin.  It provides pain relief by numbing the tissue in the area where your surgeon will make the incision.    What can I expect?    You may experience numbness, tingling, or a feeling of heaviness around the area that was injected.    If you experience any of the follow symptoms IMMEDIATELY CALL THE REGIONAL ANESTHESIA PAIN " SERVICE:    Numbness or tingling occurs in areas other than around the injection site    Blurry vision    Ringing in your ears    A metallic taste in your mouth    PAGE: Dial [phone].  When prompted, enter the following 4-digit ID number:  [id number].  You will be prompted to enter your phone number; and then enter the # sign.  The clinician on call will call you back.    OR  CALL: Dial [phone].  Let the hospital  know that you are having a problem with a nerve block and that you would like to speak to the regional anesthesia pain service right away.    You should not receive any other type of numbing medication within 4 days after receiving liposomal bupivacaine unless your anesthesiologist approves.    Post Operative Instructions: Regional Anesthetic with Liposomal Bupivacaine for Chest and Abdominal Surgery  General Information:   Regional anesthesia is when local anesthetic or  numbing  medication is injected around the nerves to anesthetize or  numb  the area supplied by that set of nerves.     Types of Regional Blocks:  Transversus Abdominis Plane (TAP): A block injected beneath the covering of a muscle layer of the abdomen for abdominal surgery  Pectoral: A block injected near the breast for surgery on the breast and armpit  Paravertebral: A block injected in the back for surgery on the chest, ribs, and breast    Procedure:  The type of anesthesia your doctor used to numb your chest or abdomen will usually not wear off for 24-48 hours, but may last as long as 72 hours.     Diet:  There are no restrictions on your diet. You should drink plenty of fluids.     Discomfort:  You will have a tingling and prickly sensation in your chest or abdomen as the feeling begins to return. You can also expect some discomfort. The amount of discomfort is unpredictable, but if you have more pain than can be controlled with pain medication you should notify your physician.     Pain Medicine:   Begin taking your oral  pain pills before bedtime and during the night to avoid a sudden onset of pain as part of the block wears off.  Do not engage in drinking, driving, or hazardous occupations while taking pain medication.     Stitches:   You may have stitches or special skin closures. You doctor will inform you when to return to the office to have them removed.

## 2018-10-24 NOTE — ANESTHESIA CARE TRANSFER NOTE
Patient: Gabriella Villarreal    Procedure(s):  Right Wire Localized Segmental Mastectomy (Lumpectomy), Right [place] Lymph Node Biopsy    Diagnosis: Breast Cancer  Diagnosis Additional Information: No value filed.    Anesthesia Type:   General, LMA, Peripheral Nerve Block     Note:  Airway :Room Air  Patient transferred to:PACU  Comments: Patient awake and breathing spont. VSS. No complaints of pain or nausea. Report to RNHandoff Report: Identifed the Patient, Identified the Reponsible Provider, Reviewed the pertinent medical history, Discussed the surgical course, Reviewed Intra-OP anesthesia mangement and issues during anesthesia, Set expectations for post-procedure period and Allowed opportunity for questions and acknowledgement of understanding      Vitals: (Last set prior to Anesthesia Care Transfer)    CRNA VITALS  10/24/2018 1421 - 10/24/2018 1457      10/24/2018             Pulse: 99    SpO2: 98 %    Resp Rate (observed): 17                Electronically Signed By: LISA Sosa CRNA  October 24, 2018  2:57 PM

## 2018-10-24 NOTE — PROGRESS NOTES
Patient received right side Pectoralis nerve block  with Exparel.  Fentanyl 50mcg and Versed 2mg given. Tolerated procedure well.

## 2018-10-24 NOTE — IP AVS SNAPSHOT
MRN:[id number]                      After Visit Summary   10/24/2018    Gabriella Villarreal    MRN: [id number]           Thank you!     Thank you for choosing [facility] for your care. Our goal is always to provide you with excellent care. Hearing back from our patients is one way we can continue to improve our services. Please take a few minutes to complete the written survey that you may receive in the mail after you visit with us. Thank you!        Patient Information     Date Of Birth          1965        About your hospital stay     You were admitted on:  October 24, 2018 [facility]    You were discharged on:  October 24, 2018       Who to Call     For medical emergencies, please call 911.  For non-urgent questions about your medical care, please call your primary care provider or clinic, [phone]  For questions related to your surgery, please call your surgery clinic        Attending Provider     Provider Kalina Head MD Surgery       Primary Care Provider Office Phone # Fax #    Anna CHAPIN Renan [phone]      After Care Instructions     Discharge Instructions       Follow up appointment as instructed by Surgeon and or RN  From Dr Oviedo:    1. Patient to follow up with appointment in 2 weeks. Your pathology report will be reviewed with you in person in clinic.  2. Do not drive while taking narcotic pain medication (oxycodone).   3. May take plain Tylenol as needed for pain.   4. Avoid non-steroidal anti-inflammatory medications (Advil, Ibuprofen, Naproxen, aspirin, etc) for 5-7 days.   5. Caution with putting ice on the incision as it will be numb you will not realize it if you leave the ice for too long and can damage your skin.  6. If you develop any fever/chills, worsening pain, redness, swelling, or drainage from your wound please call the clinic (Monday through Friday 8:00am-5:00pm [phone]  "Sonya RN) or on-call surgical oncology resident (nights and weekends [phone] and ask \"I would like to page the Surgical Oncology Resident on call.\")   7. No lifting over 15 lbs and no strenuous physical activity for 2 weeks.  You may gently stretch your arm/shoulders and reach overhead.  8. Keep dressing clean and dry. Okay to shower in 2 days. Your sutures are dissolvable. Please refrain from submerging in a bathtub or swimming pool.  Please refrain from applying alcohol or peroxide to the incision.  The wound is covered with Prineo, which will be removed at your follow-up appointment.  9. Can wear a supportive bra for comfort.                  Your next 10 appointments already scheduled     Nov 19, 2018  1:00 PM CST   (Arrive by 12:45 PM)   New Patient Visit with Karen Berrios MD   [facility] ([facility])    [address] [place] [id number] [phone]              Further instructions from your care team       [facility]  Home Care Following Anesthesia  For 24 hours after surgery:  1. Get plenty of rest.  A responsible adult must stay with you for at least 24 hours after you leave the surgery center.  2. Do not drive or use heavy equipment.  If you have weakness or tingling, don't drive or use heavy equipment until this feeling goes away.   3. Do not drink alcohol.   4. Avoid strenuous or risky activities.  Ask for help when climbing stairs.  5. You may feel lightheaded.  IF so, sit for a few minutes before standing.  Have someone help you get up.   6. If you have nausea (feel sick to your stomach): Drink only clear liquids such as apple juice, ginger ale, broth or 7-Up.  Rest may also help.  Be sure to drink enough fluids.  Move to a regular diet as you feel able.   7. You may have a slight fever.  Call the doctor if your fever is over 100 F (37.7 C) (taken under the tongue) or lasts longer " "than 24 hours.  8. You may have a dry mouth, a sore throat, muscle aches or trouble sleeping. These should go away after 24 hours.  9. Do not make important or legal decisions.   Today you received a Marcaine or bupivacaine block to numb the nerves near your surgery site.  This is a block using local anesthetic or \"numbing\" medication injected around the nerves to anesthetize or \"numb\" the area supplied by those nerves.  This block is injected into the muscle layer near your surgical site.  The medication may numb the location where you had surgery for 6-18 hours, but may last up to 24 hours.  If your surgical site is an arm or leg you should be careful with your affected limb, since it is possible to injure your limb without being aware of it due to the numbing.  Until full feeling returns, you should guard against bumping or hitting your limb, and avoid extreme hot or cold temperatures on the skin.  As the block wears off, the feeling will return as a tingling or prickly sensation near your surgical site.  You will experience more discomfort from your incision as the feeling returns.  You may want to take a pain pill (a narcotic or Tylenol if this was prescribed by your surgeon) when you start to experience mild pain before the pain beccomes more severe.  If your pain medications do not control your pain you should notifiy your surgeon.    Tips for taking pain medications  To get the best pain relief possible, remember these points:    Take pain medications as directed, before pain becomes severe.    Pain medication can upset your stomach: taking it with food may help.    Constipation is a common side effect of pain medication. Drink plenty of  fluids.    Eat foods high in fiber. Take a stool softener if recommended by your doctor or pharmacist.    Do not drink alcohol, drive or operate machinery while taking pain medications.    Ask about other ways to control pain, such as with heat, ice or " "relaxation.    Tylenol/Acetaminophen Consumption  To help encourage the safe use of acetaminophen, the makers of TYLENOL  have lowered the maximum daily dose for single-ingredient Extra Strength TYLENOL  (acetaminophen) products sold in the [place] from 8 pills per day (4,000 mg) to 6 pills per day (3,000 mg). The dosing interval has also changed from 2 pills every 4-6 hours to 2 pills every 6 hours.    If you feel your pain relief is insufficient, you may take Tylenol/Acetaminophen in addition to your narcotic pain medication.     Be careful not to exceed 3,000 mg of Tylenol/Acetaminophen in a 24 hour period from all sources.    If you are taking extra strength Tylenol/acetaminophen (500 mg), the maximum dose is 6 tablets in 24 hours.    If you are taking regular strength acetaminophen (325 mg), the maximum dose is 9 tablets in 24 hours.    Call a doctor for any of the followin. Signs of infection (fever, growing tenderness at the surgery site, a large amount of drainage or bleeding, severe pain, foul-smelling drainage, redness, swelling).  2. It has been over 8 to 10 hours since surgery and you are still not able to urinate (pass water).  3. Headache for over 24 hours.  Your doctor is:  Dr. Kalina Oviedo, Breast Center: [phone]  Or dial [phone] and ask for the resident on call for:  [facility]  For emergency care, call the:  [facility]:  [phone] (TTY for hearing impaired: [phone])    Information about liposomal bupivacaine (Exparel)    What is Liposomal Bupivacaine?    Liposomal Bupivacaine is a numbing medication that can help you manage your pain after surgery.  This medication is similar to \"novacaine,\" which is often used by the dentist.  Liposomal bupivacaine is released slowly and can help control pain for up to 72 hours.    What is the purpose of Liposomal Bupivacaine?    To manage your pain after surgery    To help you sleep better, take deep breaths, walk more " comfortable, and feel up to visiting with others    How is the procedure done?    Liposomal bupivacaine is a medication given by an injection.    It is usually given right before your surgery.  If this is the case, you will be awake or sedated, but you should experience minimal pain during the procedure.    For some people, the injection may be given at the very end of your surgery.  It all depends on the type of surgery and your situation.    The procedure usually takes about 5-15 minutes.  An ultrasound machine will help the anesthesiologist insert it in the right place or the surgeon will inject it under direct vision.     A needle is used to place the numbing medication under your skin.  It provides pain relief by numbing the tissue in the area where your surgeon will make the incision.    What can I expect?    You may experience numbness, tingling, or a feeling of heaviness around the area that was injected.    If you experience any of the follow symptoms IMMEDIATELY CALL THE REGIONAL ANESTHESIA PAIN SERVICE:    Numbness or tingling occurs in areas other than around the injection site    Blurry vision    Ringing in your ears    A metallic taste in your mouth    PAGE: Dial [phone].  When prompted, enter the following 4-digit ID number:  [id number].  You will be prompted to enter your phone number; and then enter the # sign.  The clinician on call will call you back.    OR  CALL: Dial [phone].  Let the hospital  know that you are having a problem with a nerve block and that you would like to speak to the regional anesthesia pain service right away.    You should not receive any other type of numbing medication within 4 days after receiving liposomal bupivacaine unless your anesthesiologist approves.    Post Operative Instructions: Regional Anesthetic with Liposomal Bupivacaine for Chest and Abdominal Surgery  General Information:   Regional anesthesia is when local anesthetic or  numbing  medication  is injected around the nerves to anesthetize or  numb  the area supplied by that set of nerves.     Types of Regional Blocks:  Transversus Abdominis Plane (TAP): A block injected beneath the covering of a muscle layer of the abdomen for abdominal surgery  Pectoral: A block injected near the breast for surgery on the breast and armpit  Paravertebral: A block injected in the back for surgery on the chest, ribs, and breast    Procedure:  The type of anesthesia your doctor used to numb your chest or abdomen will usually not wear off for 24-48 hours, but may last as long as 72 hours.     Diet:  There are no restrictions on your diet. You should drink plenty of fluids.     Discomfort:  You will have a tingling and prickly sensation in your chest or abdomen as the feeling begins to return. You can also expect some discomfort. The amount of discomfort is unpredictable, but if you have more pain than can be controlled with pain medication you should notify your physician.     Pain Medicine:   Begin taking your oral pain pills before bedtime and during the night to avoid a sudden onset of pain as part of the block wears off.  Do not engage in drinking, driving, or hazardous occupations while taking pain medication.     Stitches:   You may have stitches or special skin closures. You doctor will inform you when to return to the office to have them removed.         Pending Results     Date and Time Order Name Status Description    10/24/2018 1020 MA BREAST SPECIMEN RIGHT In process     10/24/2018 1020 MA POST PROCEDURE RIGHT In process     10/24/2018 1019 US SENTINAL NODE INJECTION/ID RIGHT In process     10/24/2018 1019 US BREAST WIRE PLACEMENT EA ADDTL In process     10/24/2018 1019 US BREAST WIRE PLACEMENT , 1ST LESION, RIGHT In process             Admission Information     Date & Time Provider Department Dept. Phone    10/24/2018 Kalina Oviedo MD [facility] [phone]      Your Vitals  "Were     Blood Pressure Temperature Respirations Height Weight Last Period    125/82 98.8  F (37.1  C) (Oral) 14 1.626 m (5' 4\") 82.5 kg (181 lb 14.4 oz) 10/01/2018    Pulse Oximetry BMI (Body Mass Index)                98% 31.22 kg/m2          [facility] Information     [employer] gives you secure access to your electronic health record. If you see a primary care provider, you can also send messages to your care team and make appointments. If you have questions, please call your primary care clinic.  If you do not have a primary care provider, please call [phone] and they will assist you.      [employer] is an electronic gateway that provides easy, online access to your medical records. With [facility], you can request a clinic appointment, read your test results, renew a prescription or communicate with your care team.     To access your existing account, please contact your [facility] or call [phone] for assistance.        Care EveryWhere ID     This is your Care EveryWhere ID. This could be used by other organizations to access your [facility] medical records  [id number]        Equal Access to Services     LUIGI BIRD : Hadfly Banks, adina werner, helen souza, clyde gauthier. So [facility] [phone].    ATENCIÓN: Si habla español, tiene a luna disposición servicios gratuitos de asistencia lingüística. [facility] [phone].    We comply with applicable federal civil rights laws and [place] laws. We do not discriminate on the basis of race, color, national origin, age, disability, sex, sexual orientation, or gender identity.               Review of your medicines      START taking        Dose / Directions    oxyCODONE IR 5 MG tablet   Commonly known as:  ROXICODONE   Used for:  Malignant neoplasm of lower-outer quadrant of right breast of female, estrogen receptor positive (H)        Dose:  5-10 mg   Take 1-2 tablets (5-10 mg) " by mouth every 4 hours as needed for moderate to severe pain   Quantity:  6 tablet   Refills:  0       senna-docusate 8.6-50 MG per tablet   Commonly known as:  SENOKOT-S;PERICOLACE   Used for:  Malignant neoplasm of lower-outer quadrant of right breast of female, estrogen receptor positive (H)        Dose:  1-2 tablet   Take 1-2 tablets by mouth 2 times daily   Quantity:  30 tablet   Refills:  0         CONTINUE these medicines which have NOT CHANGED        Dose / Directions    albuterol 108 (90 Base) MCG/ACT inhaler   Commonly known as:  PROAIR HFA/PROVENTIL HFA/VENTOLIN HFA        Dose:  2-4 puff   Inhale 2-4 puffs into the lungs   Refills:  0       chlorpheniramine 4 MG tablet   Commonly known as:  CHLOR-TRIMETON        Dose:  4 mg   Take 4 mg by mouth   Refills:  0       diphenhydrAMINE 25 MG tablet   Commonly known as:  BENADRYL        Refills:  0       fluticasone 50 MCG/ACT spray   Commonly known as:  FLONASE        INSTILL 2 SPRAYS INTO BOTH NOSTRILS DAILY.   Refills:  0       ibuprofen 200 MG tablet   Commonly known as:  ADVIL/MOTRIN        Dose:  200 mg   Take 200 mg by mouth   Refills:  0       levothyroxine 100 MCG tablet   Commonly known as:  SYNTHROID/LEVOTHROID        Dose:  100 mcg   Take 100 mcg by mouth every morning   Refills:  0       ranitidine 150 MG tablet   Commonly known as:  ZANTAC        Dose:  150 mg   Take 1 tablet (150 mg) by mouth 2 times daily   Quantity:  60 tablet   Refills:  1            Where to get your medicines      These medications were sent to [address] [address]    Hours:  TRANSPLANT PHONE NUMBER [phone] Phone:  [phone]     senna-docusate 8.6-50 MG per tablet         Some of these will need a paper prescription and others can be bought over the counter. Ask your nurse if you have questions.     Bring a paper prescription for each of these medications  "    oxyCODONE IR 5 MG tablet                Protect others around you: Learn how to safely use, store and throw away your medicines at www.disposemymeds.org.        Information about your nerve block     Today you received a block to numb the nerves near your surgery site.    This is a block using local anesthetic or \"numbing\" medication injected around the nerves to anesthetize or \"numb\" the area supplied by those nerves. This block is injected into the muscle layer near your surgical site. The type of anesthesia (Exparel) your anesthesia team used to numb your abdomen may give you relief for up to 72 hours.     Diet: There are no diet restrictions, but you should drink plenty of fluids, unless you are on a fluid-restricted diet.     Activity: If your surgical site is an arm or leg you should be careful with your affected limb, since it is possible to injure your limb without being aware of it due to the numbing. Until full feeling returns, you should guard against bumping or hitting your limb, and avoid extreme hot or cold temperatures on the skin.    Pain Medication: As the block wears off, the feeling will return as a tingling or prickly sensation near your surgical site. You will experience more discomfort from your incisions as the feeling returns. You may want to take a pain pill (a narcotic or Tylenol if this was prescribed by your surgeon) when you start to experience mild pain, before the pain becomes more severe. If your pain medications do not control your pain, you should notify your surgeon. If you are taking narcotics for pain management, do not drink alcohol, drive a car, or perform hazardous activities.  If you have questions or concerns you may call your surgeon at the number provided with your discharge instructions.     Call your surgeon if you experience blurry vision, ringing in the ears or metallic taste in your mouth.         Information about OPIOIDS     PRESCRIPTION OPIOIDS: WHAT YOU NEED " TO KNOW   We gave you an opioid (narcotic) pain medicine. It is important to manage your pain, but opioids are not always the best choice. You should first try all the other options your care team gave you. Take this medicine for as short a time (and as few doses) as possible.    Some activities can increase your pain, such as bandage changes or therapy sessions. It may help to take your pain medicine 30 to 60 minutes before these activities. Reduce your stress by getting enough sleep, working on hobbies you enjoy and practicing relaxation or meditation. Talk to your care team about ways to manage your pain beyond prescription opioids.    These medicines have risks:    DO NOT drive when on new or higher doses of pain medicine. These medicines can affect your alertness and reaction times, and you could be arrested for driving under the influence (DUI). If you need to use opioids long-term, talk to your care team about driving.    DO NOT operate heavy machinery    DO NOT do any other dangerous activities while taking these medicines.    DO NOT drink any alcohol while taking these medicines.     If the opioid prescribed includes acetaminophen, DO NOT take with any other medicines that contain acetaminophen. Read all labels carefully. Look for the word  acetaminophen  or  Tylenol.  Ask your pharmacist if you have questions or are unsure.    You can get addicted to pain medicines, especially if you have a history of addiction (chemical, alcohol or substance dependence). Talk to your care team about ways to reduce this risk.    All opioids tend to cause constipation. Drink plenty of water and eat foods that have a lot of fiber, such as fruits, vegetables, prune juice, apple juice and high-fiber cereal. Take a laxative (Miralax, milk of magnesia, Colace, Senna) if you don t move your bowels at least every other day. Other side effects include upset stomach, sleepiness, dizziness, throwing up, tolerance (needing more of the  medicine to have the same effect), physical dependence and slowed breathing.    Store your pills in a secure place, locked if possible. We will not replace any lost or stolen medicine. If you don t finish your medicine, please throw away (dispose) as directed by your pharmacist. The [place] Pollution Control Agency has more information about safe disposal: https://www.pca.[facility].mn.us/living-green/managing-unwanted-medications             Medication List: This is a list of all your medications and when to take them. Check marks below indicate your daily home schedule. Keep this list as a reference.      Medications           Morning Afternoon Evening Bedtime As Needed    albuterol 108 (90 Base) MCG/ACT inhaler   Commonly known as:  PROAIR HFA/PROVENTIL HFA/VENTOLIN HFA   Inhale 2-4 puffs into the lungs                                chlorpheniramine 4 MG tablet   Commonly known as:  CHLOR-TRIMETON   Take 4 mg by mouth                                diphenhydrAMINE 25 MG tablet   Commonly known as:  BENADRYL                                fluticasone 50 MCG/ACT spray   Commonly known as:  FLONASE   INSTILL 2 SPRAYS INTO BOTH NOSTRILS DAILY.                                ibuprofen 200 MG tablet   Commonly known as:  ADVIL/MOTRIN   Take 200 mg by mouth                                levothyroxine 100 MCG tablet   Commonly known as:  SYNTHROID/LEVOTHROID   Take 100 mcg by mouth every morning                                oxyCODONE IR 5 MG tablet   Commonly known as:  ROXICODONE   Take 1-2 tablets (5-10 mg) by mouth every 4 hours as needed for moderate to severe pain                                ranitidine 150 MG tablet   Commonly known as:  ZANTAC   Take 1 tablet (150 mg) by mouth 2 times daily                                senna-docusate 8.6-50 MG per tablet   Commonly known as:  SENOKOT-S;PERICOLACE   Take 1-2 tablets by mouth 2 times daily

## 2018-10-25 NOTE — PROGRESS NOTES
Jody Quiroz,    Your test results are attached.  Your pre-surgery blood tests were within normal limits.        Elif Jordan DNP, RN, ANP-C

## 2018-10-27 ENCOUNTER — HOSPITAL ENCOUNTER (EMERGENCY)
Facility: CLINIC | Age: 53
Discharge: HOME | End: 2018-10-27
Attending: EMERGENCY MEDICINE | Admitting: EMERGENCY MEDICINE
Payer: COMMERCIAL

## 2018-10-27 VITALS
SYSTOLIC BLOOD PRESSURE: 129 MMHG | DIASTOLIC BLOOD PRESSURE: 84 MMHG | RESPIRATION RATE: 16 BRPM | OXYGEN SATURATION: 100 % | TEMPERATURE: 98.3 F | HEART RATE: 70 BPM | BODY MASS INDEX: 30.64 KG/M2 | WEIGHT: 179.5 LBS | HEIGHT: 64 IN

## 2018-10-27 DIAGNOSIS — L25.9 CONTACT DERMATITIS, UNSPECIFIED CONTACT DERMATITIS TYPE, UNSPECIFIED TRIGGER: ICD-10-CM

## 2018-10-27 PROCEDURE — 99283 EMERGENCY DEPT VISIT LOW MDM: CPT | Mod: Z6 | Performed by: EMERGENCY MEDICINE

## 2018-10-27 PROCEDURE — 99282 EMERGENCY DEPT VISIT SF MDM: CPT | Performed by: EMERGENCY MEDICINE

## 2018-10-27 ASSESSMENT — ENCOUNTER SYMPTOMS
SHORTNESS OF BREATH: 0
CHILLS: 0
FEVER: 0
COUGH: 0

## 2018-10-27 NOTE — CONSULTS
"Surgical Oncology Consult    Gabriella Villarreal MRN# [id number]   YOB: 1965 Age: 53 year old      Date of Admission:  10/27/2018        Consult for:    Consulting physician/team: ED        Assessment:    53 year old female who presents with pruritic  rash around her incisions after recent lumpectomy with SLNB. Examination and clinical history consistent with allergic reaction, less likely surgical site infection         Plan:        No indication for initiation of antibiotics    Prineo dressing removed    PRN benadryl for relief of symptoms     Advised patient to call back or come back to ER if she has worsening of her current symptoms, fever,c hills, drainage from incisions         History of Present Illness:   Gabriella Villarreal is a 53 year old female who underwent a R breast lumpectomy and SLB on 10/24 with Dr. Oviedo. She now presents with  pruritic  rash around her incisions.    As per patient, pruritis started on 10/25 and has worsened since then. She initially noticed \" whiteheads\" over her right axilla (away from her incision) on 10/25 which then progressed and now she has pruritis over her axillary and breast incision. She denies any nausea, emesis, worsening pain, burning sensation, fevers or chills, drainage from her incision. She has had shingles in the past and reports that this rash is pruritic not painful .     Past Medical History:  Past Medical History:   Diagnosis Date     Allergic rhinitis      Chronic cough      GERD (gastroesophageal reflux disease)      Hypothyroidism        Past Surgical History:  Past Surgical History:   Procedure Laterality Date     CHOLECYSTECTOMY       L salpingooophorectomy  2015     LUMPECTOMY BREAST WITH SENTINEL NODE, COMBINED Right 10/24/2018    Procedure: Right Wire Localized Segmental Mastectomy (Lumpectomy), Right [place] Lymph Node Biopsy;  Surgeon: Kalina Oviedo MD;  Location:  OR       Allergies:     Allergies   Allergen Reactions     Seasonal " Allergies        Medications:    No current facility-administered medications on file prior to encounter.   Current Outpatient Prescriptions on File Prior to Encounter:  albuterol (PROAIR HFA/PROVENTIL HFA/VENTOLIN HFA) 108 (90 Base) MCG/ACT inhaler Inhale 2-4 puffs into the lungs   chlorpheniramine (CHLOR-TRIMETON) 4 MG tablet Take 4 mg by mouth    diphenhydrAMINE (BENADRYL) 25 MG tablet    fluticasone (FLONASE) 50 MCG/ACT spray INSTILL 2 SPRAYS INTO BOTH NOSTRILS DAILY.   ibuprofen (ADVIL/MOTRIN) 200 MG tablet Take 200 mg by mouth   levothyroxine (SYNTHROID/LEVOTHROID) 100 MCG tablet Take 100 mcg by mouth every morning    oxyCODONE IR (ROXICODONE) 5 MG tablet Take 1-2 tablets (5-10 mg) by mouth every 4 hours as needed for moderate to severe pain   ranitidine (ZANTAC) 150 MG tablet Take 1 tablet (150 mg) by mouth 2 times daily   senna-docusate (SENOKOT-S;PERICOLACE) 8.6-50 MG per tablet Take 1-2 tablets by mouth 2 times daily       Social History:  Social History     Social History     Marital status:      Spouse name: N/A     Number of children: 3     Years of education: N/A     Occupational History     self-employed      Social History Main Topics     Smoking status: Never Smoker     Smokeless tobacco: Never Used     Alcohol use Yes      Comment: occasional     Drug use: No     Sexual activity: Not on file     Other Topics Concern     Not on file     Social History Narrative       Family History:  Family History   Problem Relation Age of Onset     Myocardial Infarction Mother      Coronary Artery Disease Mother      Arrhythmia Father      Prostate Cancer Father      Alcoholism Father      Alcoholism Sister      Diabetes Maternal Grandmother      No Known Problems Maternal Grandfather      No Known Problems Paternal Grandmother      HEART DISEASE Paternal Grandfather        ROS:  C: NEGATIVE for fever, chills, change in weight, nausea, vomiting, diarrhea, constipation.   E/M: NEGATIVE for changes in vision,  "hearing, voice, or swallowing. No visual irritation, epistaxis, rhinorrhea, or other ear, mouth and throat problems.  R: NEGATIVE for significant cough, SOB, difficulty breathing.  CV: NEGATIVE for chest pain, palpitations or peripheral edema   GI: NEGATIVE for abdominal pain, melena, hematochezia, heartburn, or other changes in bowel habits.  : NEGATIVE for frequency, dysuria, or hematuria   M: NEGATIVE for significant arthralgias or myalgia.  N: NEGATIVE for weakness, dizziness or paresthesias   P: NEGATIVE for changes in mood or affect  The remainder of the complete ROS was negative unless noted in the HPI.    Exam:  /84  Pulse 70  Temp 98.3  F (36.8  C) (Oral)  Resp 16  Ht 1.626 m (5' 4\")  Wt 81.4 kg (179 lb 8 oz)  LMP 10/26/2018 (Exact Date)  SpO2 100%  Breastfeeding? No  BMI 30.81 kg/m2  General:  female Alert and oriented with appropriate responses to questions, in NAD.  Resp: non labored breathing   Cardiac: regular rate and rhythm, no murmur.  Extremities: No LE edema, 5/5 strength, 2+ radial and dp pulses.   Skin: erythema around axillary and R breast incision with some small vesicular/pustular appearing rash. However the skin immediately next to incision is minimally erythematous and there is no evidence of abscess. Prineo was removed and it appears the rash is just around the outer edge of prineo dressing. Rash does not appear to be dermatomal in nature   Neuro: Cn II-XII intact, moves all extremities equally    Labs:  Most Recent CBC:   Recent Labs   Lab Test  10/19/18   1824   WBC  6.8   RBC  4.08   HGB  13.3   HCT  39.4   MCV  97   MCH  32.6   MCHC  33.8   RDW  12.2   PLT  210     Most Recent BMP:   Recent Labs   Lab Test  10/19/18   1824   NA  138   POTASSIUM  3.6   CHLORIDE  106   CO2  26   BUN  10   CR  0.86   GLC  84           Imaging:  No results found for this or any previous visit (from the past 24 hour(s)).    Assessment/ Plan: See above.     Jens Correa MD   General " Surgery Resident PGY-3   Pager: [phone]    Pt reviewed with Dr. Oviedo

## 2018-10-27 NOTE — ED AVS SNAPSHOT
[facility], [place], Emergency Department    [address] [place] [id number]    Phone:  [phone]                                       Gabriella Villarreal   MRN: [id number]    Department:  [facility], Emergency Department   Date of Visit:  10/27/2018           After Visit Summary Signature Page     I have received my discharge instructions, and my questions have been answered. I have discussed any challenges I see with this plan with the nurse or doctor.    ..........................................................................................................................................  Patient/Patient Representative Signature      ..........................................................................................................................................  Patient Representative Print Name and Relationship to Patient    ..................................................               ................................................  Date                                   Time    ..........................................................................................................................................  Reviewed by Signature/Title    ...................................................              ..............................................  Date                                               Time          22EPIC Rev 08/18

## 2018-10-27 NOTE — ED PROVIDER NOTES
[facility] ([facility])  October 27, 2018    History     Chief Complaint   Patient presents with     Post-op Problem     new rash     HPI  Gabriella Villarreal is a 53 year old female with history of recently diagnosed right breast cancer status post right breast lumpectomy (10/24/18) by Dr. Kalina Oviedo who presents to the Emergency Department with a rash in setting of recent procedure. Patient reports that she developed a rash of the right axilla which is now spreading to her breast. This is not associated with any pain. She denies any fevers or chills with this. She denies any shortness of breath or change from her chronic cough.  Patient states that her incision site was treated with a glue and a dressing.      I have reviewed the Medications, Allergies, Past Medical and Surgical History, and Social History in the [employer] system.    PAST MEDICAL HISTORY:   Past Medical History:   Diagnosis Date     Allergic rhinitis      Chronic cough      GERD (gastroesophageal reflux disease)      Hypothyroidism        PAST SURGICAL HISTORY:   Past Surgical History:   Procedure Laterality Date     CHOLECYSTECTOMY       L salpingooophorectomy  2015     LUMPECTOMY BREAST WITH SENTINEL NODE, COMBINED Right 10/24/2018    Procedure: Right Wire Localized Segmental Mastectomy (Lumpectomy), Right [place] Lymph Node Biopsy;  Surgeon: Kalina Oviedo MD;  Location:  OR       FAMILY HISTORY:   Family History   Problem Relation Age of Onset     Myocardial Infarction Mother      Coronary Artery Disease Mother      Arrhythmia Father      Prostate Cancer Father      Alcoholism Father      Alcoholism Sister      Diabetes Maternal Grandmother      No Known Problems Maternal Grandfather      No Known Problems Paternal Grandmother      HEART DISEASE Paternal Grandfather        SOCIAL HISTORY:   Social History   Substance Use Topics     Smoking status: Never Smoker     Smokeless tobacco: Never Used     Alcohol use  "Yes      Comment: occasional     No current facility-administered medications for this encounter.      Current Outpatient Prescriptions   Medication     ACETAMINOPHEN PO     albuterol (PROAIR HFA/PROVENTIL HFA/VENTOLIN HFA) 108 (90 Base) MCG/ACT inhaler     chlorpheniramine (CHLOR-TRIMETON) 4 MG tablet     diphenhydrAMINE (BENADRYL) 25 MG tablet     fluticasone (FLONASE) 50 MCG/ACT spray     ibuprofen (ADVIL/MOTRIN) 200 MG tablet     levothyroxine (SYNTHROID/LEVOTHROID) 100 MCG tablet     oxyCODONE IR (ROXICODONE) 5 MG tablet     ranitidine (ZANTAC) 150 MG tablet     senna-docusate (SENOKOT-S;PERICOLACE) 8.6-50 MG per tablet        Allergies   Allergen Reactions     Seasonal Allergies        Review of Systems   Constitutional: Negative for chills and fever.   Respiratory: Negative for cough and shortness of breath.    Skin: Positive for rash (right axilla to the breast).   All other systems reviewed and are negative.      Physical Exam   BP: 129/84  Pulse: 77  Temp: 98.3  F (36.8  C)  Resp: 18  Height: 162.6 cm (5' 4\")  Weight: 81.4 kg (179 lb 8 oz)  SpO2: 100 %      Physical Exam   Constitutional: She appears well-developed and well-nourished.   Pulmonary/Chest:       Nursing note and vitals reviewed.      ED Course     ED Course     Procedures            Critical Care time:    Seen by surgery resident prior to my evaluation.  Diane removed.         Assessments & Plan (with Medical Decision Making)   53 year old female with itz-incisional erythema and pruritus status post recent breast surgery-segmental mastectomy and sentinel node biopsy.  The rash appears most consistent with a contact dermatitis.  Patient was seen by general surgery.  Antihistamines recommended.  Patient's dressing was removed.  She will follow-up with the surgery clinic as planned.    I have reviewed the nursing notes.    I have reviewed the findings, diagnosis, plan and need for follow up with the patient.    Discharge Medication List as of " 10/27/2018 11:31 AM          Final diagnoses:   Contact dermatitis, unspecified contact dermatitis type, unspecified trigger     IPadmini, am serving as a trained medical scribe to document services personally performed by Mj Andrade MD, based on the provider's statements to me.   Mj MORGAN MD, was physically present and have reviewed and verified the accuracy of this note documented by Padmini Guerrero.    10/27/2018   [facility], [place], EMERGENCY DEPARTMENT     Mj Andrade MD  10/27/18 1301

## 2018-10-27 NOTE — DISCHARGE INSTRUCTIONS
"  Contact Dermatitis  Contact dermatitis is a skin rash caused by something that touches the skin and makes it irritated and inflamed. Your skin may be red, swollen, dry, and may be cracked. Blisters may form and ooze. The rash will itch.  Contact dermatitis can form on the face and neck, backs of hands, forearms, genitals, and lower legs.  People can get contact dermatitis from lots of sources. These include:    Plants such as poison ivy, oak, or sumac    Chemicals in hair dyes and rinses, soaps, solvents, waxes, fingernail polish, and deodorants     Jewelry or watchbands made of nickel  Contact dermatitis is not passed from person to person.  Talk with your healthcare provider about what may have caused the rash. A type of allergy testing called \"patch testing\" may be used to discover what you are allergic to. You will need to avoid the source of your rash in the future to prevent it from coming back.  Treatment is done to relieve itching and prevent the rash from coming back. The rash should go away in a few days to a few weeks.  Home care  Your healthcare provider may prescribe medicine to relieve swelling and itching. Follow all instructions when using these medicines.  General care:    Avoid anything that heats up your skin, such as hot showers or baths, or direct sunlight. This can make itching worse.    Apply cold compresses to soothe your sores to help relieve your symptoms. Do this for 30 minutes 3 to 4 times a day. You can make a cold compress by soaking a cloth in cold water. Squeeze out excess water. You can add colloidal oatmeal to the water to help reduce itching. For severe itching in a small area, apply an ice pack wrapped in a thin towel. Do this for 20 minutes 3 to 4 times a day.    You can also try wet dressings. One way to do this is to wear a wet piece of clothing under a dry one. Wear a damp shirt under a dry shirt if your upper body is affected. This can relieve itching and prevent you from " scratching the affected area.    You can also help relieve large areas of itching by taking a lukewarm bath with colloidal oatmeal added to the water.    Use hydrocortisone cream for redness and irritation, unless another medicine was prescribed. You can also use benzocaine anesthetic cream or spray. Calamine lotion can also relieve mild symptoms.    Use oral diphenhydramine to help reduce itching. You can buy this antihistamine at drug and grocery stores. It can make you sleepy, so use lower doses during the daytime. Or you can use loratadine. This is an antihistamine that will not make you sleepy. Do not use diphenhydramine if you have glaucoma or have trouble urinating due to an enlarged prostate.    If a plant causes your rash, make sure to wash your skin and the clothes you were wearing when you came into contact with the plant. This is to wash away the plant oils that gave you the rash and prevent more or worse symptoms.    Stay away from the substance or object that causes your symptoms. If you can t avoid it, wear gloves or some other type of protection.  Follow-up care  Follow up with your healthcare provider, or as advised.  When to seek medical advice  Call your healthcare provider right away if any of these occur:    Spreading of the rash to other parts of your body    Severe swelling of your face, eyelids, mouth, throat or tongue    Trouble urinating due to swelling in the genital area    Fever of 100.4 F (38 C) or higher    Redness or swelling that gets worse    Pain that gets worse    Foul-smelling fluid leaking from the skin    Yellow-brown crusts on the open blisters  Date Last Reviewed: 9/1/2016 2000-2017 [employer]. [address] [place] [address]. All rights reserved. This information is not intended as a substitute for professional medical care. Always follow your healthcare professional's instructions.    Take diphenhydramine (Benadryl) as needed.  You may also take  a nonsedating antihistamine such as Claritin or Zyrtec during the day instead of the Benadryl if you need to drive or work.  Follow-up as planned.  Return to the emergency department if fever, worse, or other concerns.

## 2018-10-27 NOTE — ED TRIAGE NOTES
Pt had lumpectomy on Wednesday and developed a red, raised rash on her breast and under her arm. She was instructed to come to the ED, once here Dr Correa is to be paged per pt.

## 2018-10-27 NOTE — ED AVS SNAPSHOT
" [facility], Emergency Department    [address] [facility] [place] [address]    Phone:  [phone]                                       Gabriella Villarreal   MRN: [id number]    Department:  [facility], Emergency Department   Date of Visit:  10/27/2018           Patient Information     Date Of Birth          1965        Your diagnoses for this visit were:     Contact dermatitis, unspecified contact dermatitis type, unspecified trigger        You were seen by Mj Andrade MD.        Discharge Instructions         Contact Dermatitis  Contact dermatitis is a skin rash caused by something that touches the skin and makes it irritated and inflamed. Your skin may be red, swollen, dry, and may be cracked. Blisters may form and ooze. The rash will itch.  Contact dermatitis can form on the face and neck, backs of hands, forearms, genitals, and lower legs.  People can get contact dermatitis from lots of sources. These include:    Plants such as poison ivy, oak, or sumac    Chemicals in hair dyes and rinses, soaps, solvents, waxes, fingernail polish, and deodorants     Jewelry or watchbands made of nickel  Contact dermatitis is not passed from person to person.  Talk with your healthcare provider about what may have caused the rash. A type of allergy testing called \"patch testing\" may be used to discover what you are allergic to. You will need to avoid the source of your rash in the future to prevent it from coming back.  Treatment is done to relieve itching and prevent the rash from coming back. The rash should go away in a few days to a few weeks.  Home care  Your healthcare provider may prescribe medicine to relieve swelling and itching. Follow all instructions when using these medicines.  General care:    Avoid anything that heats up your skin, such as hot showers or baths, or direct sunlight. This can make itching worse.    Apply cold compresses to soothe your sores to help relieve your symptoms. Do this for 30 " minutes 3 to 4 times a day. You can make a cold compress by soaking a cloth in cold water. Squeeze out excess water. You can add colloidal oatmeal to the water to help reduce itching. For severe itching in a small area, apply an ice pack wrapped in a thin towel. Do this for 20 minutes 3 to 4 times a day.    You can also try wet dressings. One way to do this is to wear a wet piece of clothing under a dry one. Wear a damp shirt under a dry shirt if your upper body is affected. This can relieve itching and prevent you from scratching the affected area.    You can also help relieve large areas of itching by taking a lukewarm bath with colloidal oatmeal added to the water.    Use hydrocortisone cream for redness and irritation, unless another medicine was prescribed. You can also use benzocaine anesthetic cream or spray. Calamine lotion can also relieve mild symptoms.    Use oral diphenhydramine to help reduce itching. You can buy this antihistamine at drug and grocery stores. It can make you sleepy, so use lower doses during the daytime. Or you can use loratadine. This is an antihistamine that will not make you sleepy. Do not use diphenhydramine if you have glaucoma or have trouble urinating due to an enlarged prostate.    If a plant causes your rash, make sure to wash your skin and the clothes you were wearing when you came into contact with the plant. This is to wash away the plant oils that gave you the rash and prevent more or worse symptoms.    Stay away from the substance or object that causes your symptoms. If you can t avoid it, wear gloves or some other type of protection.  Follow-up care  Follow up with your healthcare provider, or as advised.  When to seek medical advice  Call your healthcare provider right away if any of these occur:    Spreading of the rash to other parts of your body    Severe swelling of your face, eyelids, mouth, throat or tongue    Trouble urinating due to swelling in the genital  area    Fever of 100.4 F (38 C) or higher    Redness or swelling that gets worse    Pain that gets worse    Foul-smelling fluid leaking from the skin    Yellow-brown crusts on the open blisters  Date Last Reviewed: 9/1/2016 2000-2017 [employer]. [address] [place] [id number]. All rights reserved. This information is not intended as a substitute for professional medical care. Always follow your healthcare professional's instructions.    Take diphenhydramine (Benadryl) as needed.  You may also take a nonsedating antihistamine such as Claritin or Zyrtec during the day instead of the Benadryl if you need to drive or work.  Follow-up as planned.  Return to the emergency department if fever, worse, or other concerns.      Your next 10 appointments already scheduled     Nov 06, 2018 12:45 PM CST   (Arrive by 12:30 PM)   Return Visit with Kalina Oviedo MD   [facility] ([facility])    [address] [place] [address]  [address] [place] [id number] [phone]            Nov 19, 2018  1:00 PM CST   (Arrive by 12:45 PM)   New Patient Visit with Karen Berrios MD   [facility] ([facility])    [address] [place] [id number] [phone]              24 Hour Appointment Hotline       To make an appointment at any [facility], call [phone] ([phone]). If you don't have a family doctor or clinic, we will help you find one. [facility] are conveniently located to serve the needs of you and your family.             Review of your medicines      Our records show that you are taking the medicines listed below. If these are incorrect, please call your family doctor or clinic.        Dose / Directions Last dose taken    ACETAMINOPHEN PO   Dose:  1000 mg        Take 1,000 mg by mouth   Refills:  0        albuterol 108 (90 Base) MCG/ACT inhaler   Commonly known as:   "PROAIR HFA/PROVENTIL HFA/VENTOLIN HFA   Dose:  2-4 puff        Inhale 2-4 puffs into the lungs   Refills:  0        chlorpheniramine 4 MG tablet   Commonly known as:  CHLOR-TRIMETON   Dose:  4 mg        Take 4 mg by mouth   Refills:  0        diphenhydrAMINE 25 MG tablet   Commonly known as:  BENADRYL        Refills:  0        fluticasone 50 MCG/ACT spray   Commonly known as:  FLONASE        INSTILL 2 SPRAYS INTO BOTH NOSTRILS DAILY.   Refills:  0        ibuprofen 200 MG tablet   Commonly known as:  ADVIL/MOTRIN   Dose:  200 mg        Take 200 mg by mouth   Refills:  0        levothyroxine 100 MCG tablet   Commonly known as:  SYNTHROID/LEVOTHROID   Dose:  100 mcg        Take 100 mcg by mouth every morning   Refills:  0        oxyCODONE IR 5 MG tablet   Commonly known as:  ROXICODONE   Dose:  5-10 mg   Quantity:  6 tablet        Take 1-2 tablets (5-10 mg) by mouth every 4 hours as needed for moderate to severe pain   Refills:  0        ranitidine 150 MG tablet   Commonly known as:  ZANTAC   Dose:  150 mg   Quantity:  60 tablet        Take 1 tablet (150 mg) by mouth 2 times daily   Refills:  1        senna-docusate 8.6-50 MG per tablet   Commonly known as:  SENOKOT-S;PERICOLACE   Dose:  1-2 tablet   Quantity:  30 tablet        Take 1-2 tablets by mouth 2 times daily   Refills:  0                Information about your nerve block     Today you received a block to numb the nerves near your surgery site.    This is a block using local anesthetic or \"numbing\" medication injected around the nerves to anesthetize or \"numb\" the area supplied by those nerves. This block is injected into the muscle layer near your surgical site. The type of anesthesia (Exparel) your anesthesia team used to numb your abdomen may give you relief for up to 72 hours.     Diet: There are no diet restrictions, but you should drink plenty of fluids, unless you are on a fluid-restricted diet.     Activity: If your surgical site is an arm or leg you " should be careful with your affected limb, since it is possible to injure your limb without being aware of it due to the numbing. Until full feeling returns, you should guard against bumping or hitting your limb, and avoid extreme hot or cold temperatures on the skin.    Pain Medication: As the block wears off, the feeling will return as a tingling or prickly sensation near your surgical site. You will experience more discomfort from your incisions as the feeling returns. You may want to take a pain pill (a narcotic or Tylenol if this was prescribed by your surgeon) when you start to experience mild pain, before the pain becomes more severe. If your pain medications do not control your pain, you should notify your surgeon. If you are taking narcotics for pain management, do not drink alcohol, drive a car, or perform hazardous activities.  If you have questions or concerns you may call your surgeon at the number provided with your discharge instructions.     Call your surgeon if you experience blurry vision, ringing in the ears or metallic taste in your mouth.         Orders Needing Specimen Collection     None      Pending Results     No orders found from 10/25/2018 to 10/28/2018.            Pending Culture Results     No orders found from 10/25/2018 to 10/28/2018.            Pending Results Instructions     If you had any lab results that were not finalized at the time of your Discharge, you can call the ED Lab Result RN at [phone]. You will be contacted by this team for any positive Lab results or changes in treatment. The nurses are available 7 days a week from 10A to 6:30P.  You can leave a message 24 hours per day and they will return your call.        Thank you for choosing [facility]       Thank you for choosing [facility] for your care. Our goal is always to provide you with excellent care. Hearing back from our patients is one way we can continue to improve our services. Please take a few minutes to  complete the written survey that you may receive in the mail after you visit with us. Thank you!        "[employer] Information     [employer] gives you secure access to your electronic health record. If you see a primary care provider, you can also send messages to your care team and make appointments. If you have questions, please call your primary care clinic.  If you do not have a primary care provider, please call [phone] and they will assist you.        Care EveryWhere ID     This is your Care EveryWhere ID. This could be used by other organizations to access your [facility] medical records  [id number]        Equal Access to Services     [facility] PELON : Aiden Banks, adina werner, helen souza, clyde gauthier. So [facility] [phone].    ATENCIÓN: Si habla español, tiene a luna disposición servicios gratuitos de asistencia lingüística. Llame al [phone].    We comply with applicable federal civil rights laws and [place] laws. We do not discriminate on the basis of race, color, national origin, age, disability, sex, sexual orientation, or gender identity.            After Visit Summary       This is your record. Keep this with you and show to your community pharmacist(s) and doctor(s) at your next visit.

## 2018-10-30 ENCOUNTER — TELEPHONE (OUTPATIENT)
Dept: ONCOLOGY | Facility: CLINIC | Age: 53
End: 2018-10-30

## 2018-10-30 DIAGNOSIS — C50.911 MALIGNANT NEOPLASM OF RIGHT BREAST (H): Primary | ICD-10-CM

## 2018-10-30 NOTE — TELEPHONE ENCOUNTER
Call to pt who had reported to nurse triage that her rash is worsening and continuing to spread. Spoke with Dr Oviedo who wants pt to be seen by Dermatology. Spoke with Dr Andrey Miller who said he would assist with pt being seen urgently, Derm referral placed. Pt updated with plan.

## 2018-10-31 ENCOUNTER — OFFICE VISIT (OUTPATIENT)
Dept: DERMATOLOGY | Facility: CLINIC | Age: 53
End: 2018-10-31
Payer: COMMERCIAL

## 2018-10-31 ENCOUNTER — CARE COORDINATION (OUTPATIENT)
Dept: ONCOLOGY | Facility: CLINIC | Age: 53
End: 2018-10-31

## 2018-10-31 ENCOUNTER — MYC MEDICAL ADVICE (OUTPATIENT)
Dept: DERMATOLOGY | Facility: CLINIC | Age: 53
End: 2018-10-31

## 2018-10-31 DIAGNOSIS — Z17.0 MALIGNANT NEOPLASM OF UPPER-OUTER QUADRANT OF RIGHT BREAST IN FEMALE, ESTROGEN RECEPTOR POSITIVE (H): Primary | ICD-10-CM

## 2018-10-31 DIAGNOSIS — C50.411 MALIGNANT NEOPLASM OF UPPER-OUTER QUADRANT OF RIGHT BREAST IN FEMALE, ESTROGEN RECEPTOR POSITIVE (H): Primary | ICD-10-CM

## 2018-10-31 DIAGNOSIS — L23.89 ALLERGIC CONTACT DERMATITIS DUE TO OTHER AGENTS: Primary | ICD-10-CM

## 2018-10-31 DIAGNOSIS — L23.9 ALLERGIC CONTACT DERMATITIS, UNSPECIFIED TRIGGER: Primary | ICD-10-CM

## 2018-10-31 RX ORDER — CEPHALEXIN 500 MG/1
500 CAPSULE ORAL 4 TIMES DAILY
Qty: 28 CAPSULE | Refills: 0 | Status: SHIPPED | OUTPATIENT
Start: 2018-10-31 | End: 2018-10-31

## 2018-10-31 RX ORDER — GENTIAN VIOLET 2% 2 G/100ML
1 SOLUTION TOPICAL 2 TIMES DAILY
Qty: 59 ML | Refills: 3 | Status: SHIPPED | OUTPATIENT
Start: 2018-10-31 | End: 2019-01-28

## 2018-10-31 RX ORDER — CEPHALEXIN 500 MG/1
500 CAPSULE ORAL 4 TIMES DAILY
Qty: 28 CAPSULE | Refills: 0 | Status: SHIPPED | OUTPATIENT
Start: 2018-10-31 | End: 2019-01-28

## 2018-10-31 RX ORDER — TRIAMCINOLONE ACETONIDE 1 MG/G
CREAM TOPICAL 2 TIMES DAILY
Qty: 80 G | Refills: 1 | Status: SHIPPED | OUTPATIENT
Start: 2018-10-31 | End: 2019-01-28

## 2018-10-31 ASSESSMENT — PAIN SCALES - GENERAL: PAINLEVEL: NO PAIN (0)

## 2018-10-31 NOTE — PROGRESS NOTES
"Spoke to patient with a Dermatology appointment today with Dr Simon at 2:00 at the [facility] building. Also, let patient know writer unable to see attached photo's of rash of right axilla spreading to right breast. Patient has not started Keflex, but her  will  shortly and will start as Dr Oviedo recommended. Patient taking ibuprofen that helps with the \"inflammation\" of affected right axilla skin and benadryl with the itching. Patient will have someone drive her with taking benadryl and come to the Dermatology appointment as recommended by Dr Oviedo.  Answered all patient's questions and verbalized understanding. Rosa Stockton RN, BSN.    "

## 2018-10-31 NOTE — NURSING NOTE
Dermatology Rooming Note    Gabriella Villarreal's goals for this visit include:   Chief Complaint   Patient presents with     Derm Problem     Lauren is here possible cellulitis.Its itching and fluid are draining from the area .     Mela Jones, CMA

## 2018-10-31 NOTE — PROGRESS NOTES
[facility] Dermatology Note      Dermatology Problem List:  1. No previous dermatology problems     Encounter Date: Oct 31, 2018    CC:   Chief Complaint   Patient presents with     Derm Problem     Lauren is here possible cellulitis.Its itching and fluid are draining from the area .         History of Present Illness:  Ms. Gabriella Villarreal is a 53 year old female who here for evaluation of rash on right breast  Patient underwent lumpectomy with sentinel node removal on 10/24/2018. Two days after the procedure patient developed maculopapular rash between the two incision sites with clear vesicles. She saw the surgical oncologist on Saturday and had the surgical dressing removed. Rash continued to progressively spread up to her neck torso and right arm. The skin is warm and itchy but not painful. Surgical wounds draining clear fluid. No fever or chills.   Patient has atopic background with seasonal allergy. Because of the procedure she was exposed to chlorhexidine soap and surgical prep.     Her surgical oncologist started her on keflex today.     Past Medical History:   Patient Active Problem List   Diagnosis     Malignant neoplasm of upper-outer quadrant of right breast in female, estrogen receptor positive (H)     Past Medical History:   Diagnosis Date     Allergic rhinitis      Chronic cough      GERD (gastroesophageal reflux disease)      Hypothyroidism      Past Surgical History:   Procedure Laterality Date     CHOLECYSTECTOMY       L salpingooophorectomy  2015     LUMPECTOMY BREAST WITH SENTINEL NODE, COMBINED Right 10/24/2018    Procedure: Right Wire Localized Segmental Mastectomy (Lumpectomy), Right [place] Lymph Node Biopsy;  Surgeon: Kalina Oviedo MD;  Location:  OR       Social History:  Patient  reports that she has never smoked. She has never used smokeless tobacco. She reports that she drinks alcohol. She reports that she does not use illicit drugs.    Family  History:  Family History   Problem Relation Age of Onset     Myocardial Infarction Mother      Coronary Artery Disease Mother      Arrhythmia Father      Prostate Cancer Father      Alcoholism Father      Alcoholism Sister      Diabetes Maternal Grandmother      No Known Problems Maternal Grandfather      No Known Problems Paternal Grandmother      HEART DISEASE Paternal Grandfather      Skin Cancer Other      Melanoma No family hx of        Medications:  Current Outpatient Prescriptions   Medication Sig Dispense Refill     ACETAMINOPHEN PO Take 1,000 mg by mouth       albuterol (PROAIR HFA/PROVENTIL HFA/VENTOLIN HFA) 108 (90 Base) MCG/ACT inhaler Inhale 2-4 puffs into the lungs       cephALEXin (KEFLEX) 500 MG capsule Take 1 capsule (500 mg) by mouth 4 times daily 28 capsule 0     chlorpheniramine (CHLOR-TRIMETON) 4 MG tablet Take 4 mg by mouth        diphenhydrAMINE (BENADRYL) 25 MG tablet        fluticasone (FLONASE) 50 MCG/ACT spray INSTILL 2 SPRAYS INTO BOTH NOSTRILS DAILY.       ibuprofen (ADVIL/MOTRIN) 200 MG tablet Take 200 mg by mouth       levothyroxine (SYNTHROID/LEVOTHROID) 100 MCG tablet Take 100 mcg by mouth every morning        oxyCODONE IR (ROXICODONE) 5 MG tablet Take 1-2 tablets (5-10 mg) by mouth every 4 hours as needed for moderate to severe pain 6 tablet 0     ranitidine (ZANTAC) 150 MG tablet Take 1 tablet (150 mg) by mouth 2 times daily 60 tablet 1     senna-docusate (SENOKOT-S;PERICOLACE) 8.6-50 MG per tablet Take 1-2 tablets by mouth 2 times daily 30 tablet 0        Allergies   Allergen Reactions     Seasonal Allergies          Review of Systems:  -As per HPI  -Constitutional: The patient denies fatigue, fevers, chills, unintended weight loss, and night sweats.  -HEENT: Patient denies nonhealing oral sores.  -Skin: As above in HPI. No additional skin concerns.    Physical exam:  Vitals: LMP 10/26/2018 (Exact Date)  GEN: This is a well developed, well-nourished female in no acute distress, in  a pleasant mood.    SKIN: Waist-up skin, which includes the head/face, neck, both arms, chest, back, abdomen, digits and/or nails was examined.  - maculopapular vesicular rash involving right breast and spreading to neck and upper extremities. The rash is joining to form macules   - skin is non tender with no evidence of crepitus   -No other lesions of concern on areas examined.     Impression/Plan:  1. Allergic contact dermatitis: No evidence of cellulitis (fever, chills, tenderness). The rash developed 2 days after the surgery, consistent with allergic contact dermatitis.     Gentian violet 2% BID     Triamcinolone 0.1% cream BID     RTC in 2 days    Will need allergic testing when symptoms resolve. Panel as below    Plastic panel, preservative and antibiotic panel.     Continue Keflex       Order for PATCH TESTS    [x] Outpatient  [] Inpatient: Ibrahim..../ Bed ....      Skin Atopy (atopic dermatitis) [] Yes   [] No  Rhinitis/Sinusitis:   [x] Yes   [] No --> seasonal allergies (chronic coughing any time in the day ==> DDx Reflux)  Allergic Asthma:   [] Yes   [x] No  Food Allergy:   [] Yes   [x] No  Leg ulcers:   [] Yes   [x] No  Hand eczema:   [] Yes   [x] No   Leading hand:   [] R   [] L       [] Ambidextrous                        Reason for tests (suspected allergy): adhesives (acrylates) or disinfectants (Chlorhexidine wipes)  Known previous allergies: none    Standardized panels  [x] Standard panel (40 tests)  [x] Preservatives & Antimicrobials (31 tests)  [] Emulsifiers & Additives (25 tests)   [] Perfumes/Flavours & Plants (25 tests)  [] Hairdresser panel (12 tests)  [] Rubber Chemicals (22 tests)  [x] Plastics (26 tests)  [] Colorants/Dyes/Food additives (20 tests)  [] Metals (implants/dental) (23 tests)  [] Local anaesthetics/NSAIDs (12 tests)  [x] Antibiotics & Antimycotics (14 tests)   [] Corticosteroids (15 tests)   [] Photopatch test (32 tests)   [] others: ...      [] Patient's own products: ...    DO  NOT test if chemical or biological identity is unknown!     always ask from patient the product information and safety sheets (MSDS)     Put today on arm one test field of patch test adhesive !!    [] Patient needs consultation with Allergy team (changes of tests may apply)  [] Tests discussed with Allergy team (can have direct appointment for patch tests)      Staff Physician Comments:  I saw and evaluated the patient with the resident and I agree with the assessment and plan as documented in the resident's note.    Mj Simon MD  Professor  Head of Dermato-Allergy Division  Department of Dermatology  [facility]    I spent a total of 25 min face to face with Gabriella Villarreal during today's office visit. About 50% of the time was spent counseling the patient and/or coordinating care regarding their allergy.    Staff Involved:  Resident(CLEM VelezGY-2)/Staff(as above)    ADDENDUM:  Patient called on-call resident with questions.  Unable to readily obtain gentian violet tonight as instructed.  She was instructed to proceed with the triamcinolone cream and cephalexin and that Dr. Simon would be back in clinic tomorrow at 3pm if there are any further questions.

## 2018-10-31 NOTE — MR AVS SNAPSHOT
After Visit Summary   10/31/2018    Gabriella Villarreal    MRN: [id number]           Patient Information     Date Of Birth          1965        Visit Information        Provider Department      10/31/2018 2:00 PM Mj Simon MD [facility]        Today's Diagnoses     Allergic contact dermatitis, unspecified trigger    -  1      Care Instructions    Patch Testing Information  What are allergen patch tests?    Done to look for skin allergies that may be causing rashes and irritation    Patch testing is done over three appointments Monday (Allergen patches placed), Wednesday (Patches removed), and Friday (Skin examined by MD)      Test panels with small amounts of common substances that cause skin allergies are taped onto your skin (usually on the back).    If you are allergic, there will be a small area of irritation where the test was placed on your skin.    The substances are numbered, so it is easy to tell what is causing a skin reaction.     What do I need to do in preporation for the test?    Can not be on systemic steroids for 1 month prior    No topical steroids on the test area for 1 week prior (okay to use elsewhere)    No sunbathing or tanning for one week prior to testing    Take antihistamines as normal stop taking the day before that test.    Stop use of systemic steroids for 1 month prior (If not possible discuss with Alergy specialist)     Please wear dark colors the week of the test since we write on you with a dark marker that may transfer and stain clothing and bedding.     What should I do after the tests are placed?    Keep the area dry. No shower or getting your back wet from the time we see you on Monday until after your appointment on Friday. If you get the test area wet you are washing off the test and we could get false negative reactions.    Do not exercise or do activities that may cause sweating .    Put on more tape if the test panels start to come  off.    If the marker applied by the nurse fades, you can use a dark pen to nicole around the panel sites.    Cover area when outside to avoid any sun exposure while the test is in place.     What can I expect?    Itching or burning at the test site may happen if you are allergic.  Do not rub or scratch. If itching or burning is not tolerable contact your doctor.    Your visits will be Monday, Wednesday, and Friday.     Reactions can sometimes occur 1 to 2 weeks after the tests are applied. If this happens you can contact your doctor.    To contact your doctor you can either send a [employer] message or call [phone] ask for Candace the nurse.             Follow-ups after your visit        Your next 10 appointments already scheduled     Nov 02, 2018  9:00 AM CDT   (Arrive by 8:45 AM)   Return Allergy with Mj Simon MD   [facility] ([facility])    [address] [place] [id number] [phone]            Nov 06, 2018 12:45 PM CST   (Arrive by 12:30 PM)   Return Visit with Kalina Oviedo MD   [facility] ([facility])    [address] [place] [address]  [address]  [facility] [address]   [phone]            Nov 19, 2018  1:00 PM CST   (Arrive by 12:45 PM)   New Patient Visit with Karen Berrios MD   [facility] ([facility])    [address] [place] [address]  [address]  [facility] [address]   [phone]            Nov 19, 2018  2:00 PM CST   (Arrive by 1:45 PM)   Return Allergy with Mj Simon MD   [facility] ([facility])    [address] [place] [address]  3rd [facility] [id number] [phone]            Nov 21, 2018  1:15 PM CST   (Arrive by 1:00 PM)   Return Allergy with Mj iSmon MD   [facility] ([facility])    [address] [place] [address]    [phone]            Nov 23, 2018  9:00 AM CST   (Arrive by 8:45 AM)   Return Allergy with Mj Simon MD   [facility] ([facility])    [address] [address] [phone]              Who to contact     Please call your clinic at [phone] to:    Ask questions about your health    Make or cancel appointments    Discuss your medicines    Learn about your test results    Speak to your doctor            Additional Information About Your Visit        [employer] Information     [employer] gives you secure access to your electronic health record. If you see a primary care provider, you can also send messages to your care team and make appointments. If you have questions, please call your primary care clinic.  If you do not have a primary care provider, please call [phone] and they will assist you.      [employer] is an electronic gateway that provides easy, online access to your medical records. With [employer], you can request a clinic appointment, read your test results, renew a prescription or communicate with your care team.     To access your existing account, please contact your [facility] or call [phone] for assistance.        Care EveryWhere ID     This is your Care EveryWhere ID. This could be used by other organizations to access your [place] medical records  [id number]        Your Vitals Were     Last Period                   10/26/2018 (Exact Date)            Blood Pressure from Last 3 Encounters:   10/27/18 129/84   10/24/18 125/81   10/19/18 132/82    Weight from Last 3 Encounters:   10/27/18 81.4 kg (179 lb 8 oz)   10/24/18 82.5 kg (181 lb 14.4 oz)   10/19/18 82.4 kg (181 lb 9.6 oz)              Today, you had the following     No orders found for display         Today's Medication Changes          These changes are accurate as of 10/31/18  3:58 PM.  If you have any questions, ask your  nurse or doctor.               Start taking these medicines.        Dose/Directions    cephALEXin 500 MG capsule   Commonly known as:  KEFLEX   Used for:  Malignant neoplasm of upper-outer quadrant of right breast in female, estrogen receptor positive (H)   Started by:  Kalina Oviedo MD        Dose:  500 mg   Take 1 capsule (500 mg) by mouth 4 times daily   Quantity:  28 capsule   Refills:  0            Where to get your medicines      [facility] [address] [place] [address] [facility] [email or web address] AT [place] [address]  [facility] [address]     Phone:  [phone]     cephALEXin 500 MG capsule                Primary Care Provider Office Phone # Fax #    Anna CHAPIN Renan [phone]       [place] [address] [facility] [address]        Equal Access to Services     [facility]: Hadii abbie hess hadasho Soomaali, waaxda luqadaha, qaybta kaalmada adeegyada, clyde carter haymarium tidwell . So [facility] [phone].    ATENCIÓN: Si habla español, tiene a luna disposición servicios gratuitos de asistencia lingüística. Pastora al [phone].    We comply with applicable federal civil rights laws and [place] laws. We do not discriminate on the basis of race, color, national origin, age, disability, sex, sexual orientation, or gender identity.            Thank you!     Thank you for choosing [facility] DERMATOLOG[facility]  for your care. Our goal is always to provide you with excellent care. Hearing back from our patients is one way we can continue to improve our services. Please take a few minutes to complete the written survey that you may receive in the mail after your visit with us. Thank you!             Your Updated Medication List - Protect others around you: Learn how to safely use, store and throw away your medicines at www.disposemymeds.org.          This list is accurate as of 10/31/18  3:58 PM.  Always use your most recent  med list.                   Brand Name Dispense Instructions for use Diagnosis    ACETAMINOPHEN PO      Take 1,000 mg by mouth        albuterol 108 (90 Base) MCG/ACT inhaler    PROAIR HFA/PROVENTIL HFA/VENTOLIN HFA     Inhale 2-4 puffs into the lungs        cephALEXin 500 MG capsule    KEFLEX    28 capsule    Take 1 capsule (500 mg) by mouth 4 times daily    Malignant neoplasm of upper-outer quadrant of right breast in female, estrogen receptor positive (H)       chlorpheniramine 4 MG tablet    CHLOR-TRIMETON     Take 4 mg by mouth        diphenhydrAMINE 25 MG tablet    BENADRYL          fluticasone 50 MCG/ACT spray    FLONASE     INSTILL 2 SPRAYS INTO BOTH NOSTRILS DAILY.        ibuprofen 200 MG tablet    ADVIL/MOTRIN     Take 200 mg by mouth        levothyroxine 100 MCG tablet    SYNTHROID/LEVOTHROID     Take 100 mcg by mouth every morning        oxyCODONE IR 5 MG tablet    ROXICODONE    6 tablet    Take 1-2 tablets (5-10 mg) by mouth every 4 hours as needed for moderate to severe pain    Malignant neoplasm of lower-outer quadrant of right breast of female, estrogen receptor positive (H)       ranitidine 150 MG tablet    ZANTAC    60 tablet    Take 1 tablet (150 mg) by mouth 2 times daily        senna-docusate 8.6-50 MG per tablet    SENOKOT-S;PERICOLACE    30 tablet    Take 1-2 tablets by mouth 2 times daily    Malignant neoplasm of lower-outer quadrant of right breast of female, estrogen receptor positive (H)

## 2018-10-31 NOTE — PATIENT INSTRUCTIONS
Patch Testing Information  What are allergen patch tests?    Done to look for skin allergies that may be causing rashes and irritation    Patch testing is done over three appointments Monday (Allergen patches placed), Wednesday (Patches removed), and Friday (Skin examined by MD)      Test panels with small amounts of common substances that cause skin allergies are taped onto your skin (usually on the back).    If you are allergic, there will be a small area of irritation where the test was placed on your skin.    The substances are numbered, so it is easy to tell what is causing a skin reaction.     What do I need to do in preporation for the test?    Can not be on systemic steroids for 1 month prior    No topical steroids on the test area for 1 week prior (okay to use elsewhere)    No sunbathing or tanning for one week prior to testing    Take antihistamines as normal stop taking the day before that test.    Stop use of systemic steroids for 1 month prior (If not possible discuss with Alergy specialist)     Please wear dark colors the week of the test since we write on you with a dark marker that may transfer and stain clothing and bedding.     What should I do after the tests are placed?    Keep the area dry. No shower or getting your back wet from the time we see you on Monday until after your appointment on Friday. If you get the test area wet you are washing off the test and we could get false negative reactions.    Do not exercise or do activities that may cause sweating .    Put on more tape if the test panels start to come off.    If the marker applied by the nurse fades, you can use a dark pen to nicole around the panel sites.    Cover area when outside to avoid any sun exposure while the test is in place.     What can I expect?    Itching or burning at the test site may happen if you are allergic.  Do not rub or scratch. If itching or burning is not tolerable contact your doctor.    Your visits will be  Monday, Wednesday, and Friday.     Reactions can sometimes occur 1 to 2 weeks after the tests are applied. If this happens you can contact your doctor.    To contact your doctor you can either send a [employer] message or call [phone] ask for Candace the nurse.

## 2018-10-31 NOTE — LETTER
10/31/2018       RE: Gabriella Villarreal  [address] [facility] [address]     Dear Colleague,    Thank you for referring your patient, Gabriella Villarreal, to the [facility] DERMATOLOGY at [facility]. Please see a copy of my visit note below.    [facility] Dermatology Note      Dermatology Problem List:  1. No previous dermatology problems     Encounter Date: Oct 31, 2018    CC:   Chief Complaint   Patient presents with     Derm Problem     Lauren is here possible cellulitis.Its itching and fluid are draining from the area .         History of Present Illness:  Ms. Gabriella Villarreal is a 53 year old female who here for evaluation of rash on right breast  Patient underwent lumpectomy with sentinel node removal on 10/24/2018. Two days after the procedure patient developed maculopapular rash between the two incision sites with clear vesicles. She saw the surgical oncologist on Saturday and had the surgical dressing removed. Rash continued to progressively spread up to her neck torso and right arm. The skin is warm and itchy but not painful. Surgical wounds draining clear fluid. No fever or chills.   Patient has atopic background with seasonal allergy. Because of the procedure she was exposed to chlorhexidine soap and surgical prep.     Her surgical oncologist started her on keflex today.     Past Medical History:   Patient Active Problem List   Diagnosis     Malignant neoplasm of upper-outer quadrant of right breast in female, estrogen receptor positive (H)     Past Medical History:   Diagnosis Date     Allergic rhinitis      Chronic cough      GERD (gastroesophageal reflux disease)      Hypothyroidism      Past Surgical History:   Procedure Laterality Date     CHOLECYSTECTOMY       L salpingooophorectomy  2015     LUMPECTOMY BREAST WITH SENTINEL NODE, COMBINED Right 10/24/2018    Procedure: Right Wire Localized Segmental Mastectomy (Lumpectomy), Right Musella  Lymph Node Biopsy;  Surgeon: Kalina Oviedo MD;  Location:  OR       Social History:  Patient  reports that she has never smoked. She has never used smokeless tobacco. She reports that she drinks alcohol. She reports that she does not use illicit drugs.    Family History:  Family History   Problem Relation Age of Onset     Myocardial Infarction Mother      Coronary Artery Disease Mother      Arrhythmia Father      Prostate Cancer Father      Alcoholism Father      Alcoholism Sister      Diabetes Maternal Grandmother      No Known Problems Maternal Grandfather      No Known Problems Paternal Grandmother      HEART DISEASE Paternal Grandfather      Skin Cancer Other      Melanoma No family hx of        Medications:  Current Outpatient Prescriptions   Medication Sig Dispense Refill     ACETAMINOPHEN PO Take 1,000 mg by mouth       albuterol (PROAIR HFA/PROVENTIL HFA/VENTOLIN HFA) 108 (90 Base) MCG/ACT inhaler Inhale 2-4 puffs into the lungs       cephALEXin (KEFLEX) 500 MG capsule Take 1 capsule (500 mg) by mouth 4 times daily 28 capsule 0     chlorpheniramine (CHLOR-TRIMETON) 4 MG tablet Take 4 mg by mouth        diphenhydrAMINE (BENADRYL) 25 MG tablet        fluticasone (FLONASE) 50 MCG/ACT spray INSTILL 2 SPRAYS INTO BOTH NOSTRILS DAILY.       ibuprofen (ADVIL/MOTRIN) 200 MG tablet Take 200 mg by mouth       levothyroxine (SYNTHROID/LEVOTHROID) 100 MCG tablet Take 100 mcg by mouth every morning        oxyCODONE IR (ROXICODONE) 5 MG tablet Take 1-2 tablets (5-10 mg) by mouth every 4 hours as needed for moderate to severe pain 6 tablet 0     ranitidine (ZANTAC) 150 MG tablet Take 1 tablet (150 mg) by mouth 2 times daily 60 tablet 1     senna-docusate (SENOKOT-S;PERICOLACE) 8.6-50 MG per tablet Take 1-2 tablets by mouth 2 times daily 30 tablet 0        Allergies   Allergen Reactions     Seasonal Allergies          Review of Systems:  -As per HPI  -Constitutional: The patient denies fatigue, fevers, chills,  unintended weight loss, and night sweats.  -HEENT: Patient denies nonhealing oral sores.  -Skin: As above in HPI. No additional skin concerns.    Physical exam:  Vitals: LMP 10/26/2018 (Exact Date)  GEN: This is a well developed, well-nourished female in no acute distress, in a pleasant mood.    SKIN: Waist-up skin, which includes the head/face, neck, both arms, chest, back, abdomen, digits and/or nails was examined.  - maculopapular vesicular rash involving right breast and spreading to neck and upper extremities. The rash is joining to form macules   - skin is non tender with no evidence of crepitus   -No other lesions of concern on areas examined.     Impression/Plan:  1. Allergic contact dermatitis: No evidence of cellulitis (fever, chills, tenderness). The rash developed 2 days after the surgery, consistent with allergic contact dermatitis.     Gentian violet 2% BID     Triamcinolone 0.1% cream BID     RTC in 2 days    Will need allergic testing when symptoms resolve. Panel as below    Plastic panel, preservative and antibiotic panel.     Continue Keflex       Order for PATCH TESTS    [x] Outpatient  [] Inpatient: Ibrahim..../ Bed ....      Skin Atopy (atopic dermatitis) [] Yes   [] No  Rhinitis/Sinusitis:   [x] Yes   [] No --> seasonal allergies (chronic coughing any time in the day ==> DDx Reflux)  Allergic Asthma:   [] Yes   [x] No  Food Allergy:   [] Yes   [x] No  Leg ulcers:   [] Yes   [x] No  Hand eczema:   [] Yes   [x] No   Leading hand:   [] R   [] L       [] Ambidextrous                        Reason for tests (suspected allergy): adhesives (acrylates) or disinfectants (Chlorhexidine wipes)  Known previous allergies: none    Standardized panels  [x] Standard panel (40 tests)  [x] Preservatives & Antimicrobials (31 tests)  [] Emulsifiers & Additives (25 tests)   [] Perfumes/Flavours & Plants (25 tests)  [] Hairdresser panel (12 tests)  [] Rubber Chemicals (22 tests)  [x] Plastics (26 tests)  []  Colorants/Dyes/Food additives (20 tests)  [] Metals (implants/dental) (23 tests)  [] Local anaesthetics/NSAIDs (12 tests)  [x] Antibiotics & Antimycotics (14 tests)   [] Corticosteroids (15 tests)   [] Photopatch test (32 tests)   [] others: ...      [] Patient's own products: ...    DO NOT test if chemical or biological identity is unknown!     always ask from patient the product information and safety sheets (MSDS)     Put today on arm one test field of patch test adhesive !!    [] Patient needs consultation with Allergy team (changes of tests may apply)  [] Tests discussed with Allergy team (can have direct appointment for patch tests)      Staff Physician Comments:  I saw and evaluated the patient with the resident and I agree with the assessment and plan as documented in the resident's note.    Mj Simon MD  Professor  Head of Dermato-Allergy Division  Department of Dermatology  [facility]    I spent a total of 25 min face to face with Gabriella Villarreal during today's office visit. About 50% of the time was spent counseling the patient and/or coordinating care regarding their allergy.    Staff Involved:  Resident(Raj Porter, IMPGY-2)/Staff(as above)    ADDENDUM:  Patient called on-call resident with questions.  Unable to readily obtain gentian violet tonight as instructed.  She was instructed to proceed with the triamcinolone cream and cephalexin and that Dr. Simon would be back in clinic tomorrow at 3pm if there are any further questions.

## 2018-11-02 ENCOUNTER — OFFICE VISIT (OUTPATIENT)
Dept: DERMATOLOGY | Facility: CLINIC | Age: 53
End: 2018-11-02
Payer: COMMERCIAL

## 2018-11-02 DIAGNOSIS — L23.9 ALLERGIC CONTACT DERMATITIS, UNSPECIFIED TRIGGER: Primary | ICD-10-CM

## 2018-11-02 RX ORDER — EMOLLIENT BASE
CREAM (GRAM) TOPICAL PRN
Qty: 57 G | Refills: 0 | Status: SHIPPED | OUTPATIENT
Start: 2018-11-02 | End: 2019-05-01

## 2018-11-02 ASSESSMENT — PAIN SCALES - GENERAL: PAINLEVEL: NO PAIN (0)

## 2018-11-02 NOTE — PROGRESS NOTES
"[facility] Dermatology Note      Dermatology Problem List:  1. Allergic contact dermatitis    Encounter Date: Nov 2, 2018    CC:  Chief Complaint   Patient presents with     Derm Problem     Lauren is here today for a rash follow up- Lauren states \"it's better in some spots\".          History of Present Illness:  Ms. Gabriella Villarreal is a 53 year old female who presents as a follow-up for rash on the right breast. The patient was last seen 10/31. She underwent lumpectomy with sentinel node removal on 10/24, and developed a vesicular maculopapular rash between the two incision sites on 10/26. The rash spread to her neck, torso, and right arm, it was warm to the touch, and pruritic. She was seen by surgical oncology and her wounds were not concerning for infection. She does have a history of atopy. At her last visit on 10/31 (see photo in Media), she was started on Gentian violet BID, triamcinolone 0.1% cream BID, and keflex per surgical oncology. She has been unable to get the gentian, but has been using the triamcinolone on her entire rash and taking keflex as prescribed. She also has been switching off between benadryl and zyrtec to control the pruritis.    Today she returns to clinic with much improvement in the erythema and swelling. She is concerned that the rash is continuing to spread very slowly down her right arm, around toward her back, across her chest, and down her abdomen. The rash is also persistently pruritic. However, she believes it has mostly been improving. She has not experienced fevers, chills, sweats, or increased pain at her incision sites. There has been no more drainage from the incision sites, nor has her rash been weeping or bleeding.    Of note, she had a small operation during which they used surgical glue in 2015. She did not react at that time.    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 She has never used smokeless tobacco. She reports that she drinks alcohol. She reports that she does not use illicit drugs.    Family History:  Family History   Problem Relation Age of Onset     Myocardial Infarction Mother      Coronary Artery Disease Mother      Arrhythmia Father      Prostate Cancer Father      Alcoholism Father      Alcoholism Sister      Diabetes Maternal Grandmother      No Known Problems Maternal Grandfather      No Known Problems Paternal Grandmother      HEART DISEASE Paternal Grandfather      Skin Cancer Other      Melanoma No family hx of        Medications:  Current Outpatient Prescriptions   Medication Sig Dispense Refill     ACETAMINOPHEN PO Take 1,000 mg by mouth       albuterol (PROAIR HFA/PROVENTIL HFA/VENTOLIN HFA) 108 (90 Base) MCG/ACT inhaler Inhale 2-4 puffs into the lungs       cephALEXin (KEFLEX) 500 MG capsule Take 1 capsule (500 mg) by mouth 4 times daily 28 capsule 0     chlorpheniramine (CHLOR-TRIMETON) 4 MG tablet Take 4 mg by mouth        diphenhydrAMINE (BENADRYL) 25 MG tablet        fluticasone (FLONASE) 50 MCG/ACT spray INSTILL 2 SPRAYS INTO BOTH NOSTRILS DAILY.       gentian violet 1 % solution Apply 0.5 mLs topically 3 times daily 59 mL 1     gentian violet 2 % solution Apply 1 mL topically 2 times daily For affected area 1 - 2 times a day. Apply to area before applying triamcinolone  cream 59 mL 3     ibuprofen (ADVIL/MOTRIN) 200 MG tablet Take 200 mg by mouth       levothyroxine (SYNTHROID/LEVOTHROID) 100 MCG tablet Take 100 mcg by mouth every morning        oxyCODONE IR (ROXICODONE) 5 MG tablet Take 1-2 tablets (5-10 mg) by mouth every 4 hours as needed for moderate to severe pain 6 tablet 0     ranitidine (ZANTAC) 150 MG tablet Take 1 tablet (150 mg) by mouth 2 times daily 60 tablet 1     senna-docusate (SENOKOT-S;PERICOLACE) 8.6-50 MG per tablet Take 1-2 tablets by mouth 2 times daily 30 tablet 0     triamcinolone (KENALOG) 0.1 % cream Apply topically 2 times daily 80 g 1     Allergies   Allergen Reactions     Seasonal Allergies          Review of Systems:  -As per HPI  -Constitutional: The patient denies fatigue, fevers, chills, unintended weight loss, and night sweats.  -HEENT: Patient denies nonhealing oral sores.  -Skin: As above in HPI. No additional skin concerns.    Physical exam:  Vitals: LMP 10/26/2018 (Exact Date)  GEN: This is a well developed, well-nourished female in no acute distress, in a pleasant mood.    SKIN: Waist-up skin, which includes the head/face, neck, both arms, chest, back, abdomen, digits and/or nails was examined.  - Maculopapular rash involving right breast, spreading to neck, right arm immediately distal to antecubital fossa, down to naval, and around to the right side.  - Focused erythema of right breast and right axilla, with few vesicles around site of adhesion, improved from prior  - Rash is non-tender, is warm to touch  - No active drainage from incision or rash  - No other lesions of concern on areas examined.     Impression/Plan:  1. Allergic Contact dermatitis    Start Gentian violet BID when able to get it    Continue triamcinolone 0.1% BID for 3-4 more days    Plan for allergy patch test when symptoms resolve, orders as below    Continue Keflex as prescribed by surgical oncology     Use vanicream on skin after rash resolves    Okay to take both  benadryl and zyrtec     Order for PATCH TESTS     [x] Outpatient                                              [] Inpatient: Ibrahim..../ Bed ....                                        Skin Atopy (atopic dermatitis)                  [] Yes   [] No  Rhinitis/Sinusitis:                                                            [x] Yes   [] No --> seasonal allergies (chronic coughing any time in the day ==> DDx Reflux)  Allergic Asthma:                                                              [] Yes   [x] No  Food Allergy:                                                                   [] Yes   [x] No  Leg ulcers:                                                                       [] Yes   [x] No  Hand eczema:                                                                  [] Yes   [x] No                                           Leading hand:   [] R   [] L       [] Ambidextrous                         Reason for tests (suspected allergy): adhesives (acrylates) or disinfectants (Chlorhexidine wipes)  Known previous allergies: none     Standardized panels  [x] Standard panel (40 tests)  [x] Preservatives & Antimicrobials (31 tests)  [] Emulsifiers & Additives (25 tests)   [] Perfumes/Flavours & Plants (25 tests)  [] Hairdresser panel (12 tests)  [] Rubber Chemicals (22 tests)  [x] Plastics (26 tests)  [] Colorants/Dyes/Food additives (20 tests)  [] Metals (implants/dental) (23 tests)  [] Local anaesthetics/NSAIDs (12 tests)  [x] Antibiotics & Antimycotics (14 tests)   [] Corticosteroids (15 tests)   [] Photopatch test (32 tests)   [] others: ...                             [] Patient's own products: ...  DO NOT test if chemical or biological identity is unknown!   always ask from patient the product information and safety sheets (MSDS)      Put today on arm one test field of patch test adhesive !!     [] Patient needs consultation with Allergy team (changes of tests may apply)  [] Tests discussed with Allergy  team (can have direct appointment for patch tests)        Follow-up for allergy patch testing November 19.     Staff Involved:  I, Jazmyne Tao MS4, saw and examined the patient in the presence of Dr. Simon.     Staff Physician Comments:  I was present with the medical student who participated in the service and in the documentation of the note. I have verified the history and personally performed the physical exam and medical decision making. I agree with the assessment and plan as documented in the note. I have reviewed and if necessary amended the note.      Mj Simon MD  Professor  Head of Dermato-Allergy Division  Department of Dermatology  [facility]

## 2018-11-02 NOTE — MR AVS SNAPSHOT
After Visit Summary   11/2/2018    Gabriella Villarreal    MRN: [id number]           Patient Information     Date Of Birth          1965        Visit Information        Provider Department      11/2/2018 9:00 AM Mj Simon MD [facility] Dermatology        Today's Diagnoses     Allergic contact dermatitis, unspecified trigger    -  1      Care Instructions      Start Gentian violet twice daily when you are able to get it    Continue Triamcinolone twice daily for 3-4 more days    Continue Keflex as prescribed by surgical oncology     Use Vanicream on dry skin after rash resolves    Okay to take both Benadryl and Zyrtec for itching    Plan for allergy patch test when symptoms resolve, week of Thanksgiving            Follow-ups after your visit        Your next 10 appointments already scheduled     Nov 06, 2018 12:45 PM CST   (Arrive by 12:30 PM)   Return Visit with Kalina Oviedo MD   [facility] ([facility])    [address] [place] [address]  [address] [place] [address]   [phone]            Nov 19, 2018  1:00 PM CST   (Arrive by 12:45 PM)   New Patient Visit with Karen Berrios MD   [facility] ([facility])    [address] [place] [address]  [address]  [facility] [address]   [phone]            Nov 19, 2018  2:00 PM CST   (Arrive by 1:45 PM)   Return Allergy with Mj Simon MD   [facility] Dermatology ([facility])    [address]  [address] [facility] [place] [address]   [phone]            Nov 21, 2018  1:15 PM CST   (Arrive by 1:00 PM)   Return Allergy with Mj Simon MD   [facility] ([facility])    [address] [place] [address]  3rd Floor  [facility] [address]   [phone]            Nov 23, 2018  9:00 AM CST   (Arrive by 8:45 AM)   Return Allergy with Mj Simon MD   [facility] Dermatology ([facility]  [place])    [address] [facility]  [address] [facility] [address] [phone]              Who to contact     Please call your clinic at [phone] to:    Ask questions about your health    Make or cancel appointments    Discuss your medicines    Learn about your test results    Speak to your doctor            Additional Information About Your Visit        MyChart Information     ViewRayt gives you secure access to your electronic health record. If you see a primary care provider, you can also send messages to your care team and make appointments. If you have questions, please call your primary care clinic.  If you do not have a primary care provider, please call [phone] and they will assist you.      Flinqer is an electronic gateway that provides easy, online access to your medical records. With Flin[employer]er, you can request a clinic appointment, read your test results, renew a prescription or communicate with your care team.     To access your existing account, please contact your [facility] or call [phone] for assistance.        Care EveryWhere ID     This is your Care EveryWhere ID. This could be used by other organizations to access your [place] medical records  [id number]        Your Vitals Were     Last Period                   10/26/2018 (Exact Date)            Blood Pressure from Last 3 Encounters:   10/27/18 129/84   10/24/18 125/81   10/19/18 132/82    Weight from Last 3 Encounters:   10/27/18 81.4 kg (179 lb 8 oz)   10/24/18 82.5 kg (181 lb 14.4 oz)   10/19/18 82.4 kg (181 lb 9.6 oz)              Today, you had the following     No orders found for display       Primary Care Provider Office Phone # Fax #    Anna CHAPIN Renan [phone]       [place] [address] [facility] [address]        Equal Access to Services     LUIGI BIRD : Aiden Banks, adina werner, helen souza, clyde alvarez  yamilepinalouie pradhanaavignesh ah. So [facility] [phone].    ATENCIÓN: Si arelis paige, tiene a luna disposición servicios gratuitos de asistencia lingüística. Pastora al [phone].    We comply with applicable federal civil rights laws and [place] laws. We do not discriminate on the basis of race, color, national origin, age, disability, sex, sexual orientation, or gender identity.            Thank you!     Thank you for choosing [facility] DERMATOLOGY  for your care. Our goal is always to provide you with excellent care. Hearing back from our patients is one way we can continue to improve our services. Please take a few minutes to complete the written survey that you may receive in the mail after your visit with us. Thank you!             Your Updated Medication List - Protect others around you: Learn how to safely use, store and throw away your medicines at www.disposemymeds.org.          This list is accurate as of 11/2/18  9:42 AM.  Always use your most recent med list.                   Brand Name Dispense Instructions for use Diagnosis    ACETAMINOPHEN PO      Take 1,000 mg by mouth        albuterol 108 (90 Base) MCG/ACT inhaler    PROAIR HFA/PROVENTIL HFA/VENTOLIN HFA     Inhale 2-4 puffs into the lungs        cephALEXin 500 MG capsule    KEFLEX    28 capsule    Take 1 capsule (500 mg) by mouth 4 times daily    Malignant neoplasm of upper-outer quadrant of right breast in female, estrogen receptor positive (H)       chlorpheniramine 4 MG tablet    CHLOR-TRIMETON     Take 4 mg by mouth        diphenhydrAMINE 25 MG tablet    BENADRYL          fluticasone 50 MCG/ACT spray    FLONASE     INSTILL 2 SPRAYS INTO BOTH NOSTRILS DAILY.        * gentian violet 2 % solution     59 mL    Apply 1 mL topically 2 times daily For affected area 1 - 2 times a day. Apply to area before applying triamcinolone cream    Allergic contact dermatitis, unspecified trigger       * gentian violet 1 % solution     59 mL    Apply 0.5 mLs topically 3 times  daily    Allergic contact dermatitis due to other agents       ibuprofen 200 MG tablet    ADVIL/MOTRIN     Take 200 mg by mouth        levothyroxine 100 MCG tablet    SYNTHROID/LEVOTHROID     Take 100 mcg by mouth every morning        oxyCODONE IR 5 MG tablet    ROXICODONE    6 tablet    Take 1-2 tablets (5-10 mg) by mouth every 4 hours as needed for moderate to severe pain    Malignant neoplasm of lower-outer quadrant of right breast of female, estrogen receptor positive (H)       ranitidine 150 MG tablet    ZANTAC    60 tablet    Take 1 tablet (150 mg) by mouth 2 times daily        senna-docusate 8.6-50 MG per tablet    SENOKOT-S;PERICOLACE    30 tablet    Take 1-2 tablets by mouth 2 times daily    Malignant neoplasm of lower-outer quadrant of right breast of female, estrogen receptor positive (H)       triamcinolone 0.1 % cream    KENALOG    80 g    Apply topically 2 times daily    Allergic contact dermatitis, unspecified trigger       * Notice:  This list has 2 medication(s) that are the same as other medications prescribed for you. Read the directions carefully, and ask your doctor or other care provider to review them with you.

## 2018-11-02 NOTE — NURSING NOTE
"Dermatology Rooming Note    Gabriella Villarreal's goals for this visit include:   Chief Complaint   Patient presents with     Derm Problem     Lauren is here today for a rash follow up- Lauren states \"it's better in some spots\".      Marianne Angelo, RMTAVARES     "

## 2018-11-02 NOTE — LETTER
"11/2/2018       RE: Gabriella Villarreal  [address] [facility] [address]     Dear Colleague,    Thank you for referring your patient, Gabriella Villarreal, to the [facility] DERMATOLOGY at [facility]. Please see a copy of my visit note below.    [facility] Dermatology Note      Dermatology Problem List:  1. Allergic contact dermatitis    Encounter Date: Nov 2, 2018    CC:  Chief Complaint   Patient presents with     Derm Problem     Lauren is here today for a rash follow up- Lauren states \"it's better in some spots\".          History of Present Illness:  Ms. Gabriella Villarreal is a 53 year old female who presents as a follow-up for rash on the right breast. The patient was last seen 10/31. She underwent lumpectomy with sentinel node removal on 10/24, and developed a vesicular maculopapular rash between the two incision sites on 10/26. The rash spread to her neck, torso, and right arm, it was warm to the touch, and pruritic. She was seen by surgical oncology and her wounds were not concerning for infection. She does have a history of atopy. At her last visit on 10/31 (see photo in Media), she was started on Gentian violet BID, triamcinolone 0.1% cream BID, and keflex per surgical oncology. She has been unable to get the gentian, but has been using the triamcinolone on her entire rash and taking keflex as prescribed. She also has been switching off between benadryl and zyrtec to control the pruritis.    Today she returns to clinic with much improvement in the erythema and swelling. She is concerned that the rash is continuing to spread very slowly down her right arm, around toward her back, across her chest, and down her abdomen. The rash is also persistently pruritic. However, she believes it has mostly been improving. She has not experienced fevers, chills, sweats, or increased pain at her incision sites. There has been no more drainage from the incision sites, " nor has her rash been weeping or bleeding.    Of note, she had a small operation during which they used surgical glue in 2015. She did not react at that time.    Past Medical History:   Patient Active Problem List   Diagnosis     Malignant neoplasm of upper-outer quadrant of right breast in female, estrogen receptor positive (H)     Past Medical History:   Diagnosis Date     Allergic rhinitis      Chronic cough      GERD (gastroesophageal reflux disease)      Hypothyroidism      Past Surgical History:   Procedure Laterality Date     CHOLECYSTECTOMY       L salpingooophorectomy  2015     LUMPECTOMY BREAST WITH SENTINEL NODE, COMBINED Right 10/24/2018    Procedure: Right Wire Localized Segmental Mastectomy (Lumpectomy), Right [place] Lymph Node Biopsy;  Surgeon: Kalina Oviedo MD;  Location:  OR       Social History:  Patient  reports that she has never smoked. She has never used smokeless tobacco. She reports that she drinks alcohol. She reports that she does not use illicit drugs.    Family History:  Family History   Problem Relation Age of Onset     Myocardial Infarction Mother      Coronary Artery Disease Mother      Arrhythmia Father      Prostate Cancer Father      Alcoholism Father      Alcoholism Sister      Diabetes Maternal Grandmother      No Known Problems Maternal Grandfather      No Known Problems Paternal Grandmother      HEART DISEASE Paternal Grandfather      Skin Cancer Other      Melanoma No family hx of        Medications:  Current Outpatient Prescriptions   Medication Sig Dispense Refill     ACETAMINOPHEN PO Take 1,000 mg by mouth       albuterol (PROAIR HFA/PROVENTIL HFA/VENTOLIN HFA) 108 (90 Base) MCG/ACT inhaler Inhale 2-4 puffs into the lungs       cephALEXin (KEFLEX) 500 MG capsule Take 1 capsule (500 mg) by mouth 4 times daily 28 capsule 0     chlorpheniramine (CHLOR-TRIMETON) 4 MG tablet Take 4 mg by mouth        diphenhydrAMINE (BENADRYL) 25 MG tablet        fluticasone (FLONASE)  50 MCG/ACT spray INSTILL 2 SPRAYS INTO BOTH NOSTRILS DAILY.       gentian violet 1 % solution Apply 0.5 mLs topically 3 times daily 59 mL 1     gentian violet 2 % solution Apply 1 mL topically 2 times daily For affected area 1 - 2 times a day. Apply to area before applying triamcinolone cream 59 mL 3     ibuprofen (ADVIL/MOTRIN) 200 MG tablet Take 200 mg by mouth       levothyroxine (SYNTHROID/LEVOTHROID) 100 MCG tablet Take 100 mcg by mouth every morning        oxyCODONE IR (ROXICODONE) 5 MG tablet Take 1-2 tablets (5-10 mg) by mouth every 4 hours as needed for moderate to severe pain 6 tablet 0     ranitidine (ZANTAC) 150 MG tablet Take 1 tablet (150 mg) by mouth 2 times daily 60 tablet 1     senna-docusate (SENOKOT-S;PERICOLACE) 8.6-50 MG per tablet Take 1-2 tablets by mouth 2 times daily 30 tablet 0     triamcinolone (KENALOG) 0.1 % cream Apply topically 2 times daily 80 g 1     Allergies   Allergen Reactions     Seasonal Allergies          Review of Systems:  -As per HPI  -Constitutional: The patient denies fatigue, fevers, chills, unintended weight loss, and night sweats.  -HEENT: Patient denies nonhealing oral sores.  -Skin: As above in HPI. No additional skin concerns.    Physical exam:  Vitals: [facility] 10/26/2018 (Exact Date)  GEN: This is a well developed, well-nourished female in no acute distress, in a pleasant mood.    SKIN: Waist-up skin, which includes the head/face, neck, both arms, chest, back, abdomen, digits and/or nails was examined.  - Maculopapular rash involving right breast, spreading to neck, right arm immediately distal to antecubital fossa, down to naval, and around to the right side.  - Focused erythema of right breast and right axilla, with few vesicles around site of adhesion, improved from prior  - Rash is non-tender, is warm to touch  - No active drainage from incision or rash  - No other lesions of concern on areas examined.     Impression/Plan:  1. Allergic Contact dermatitis    Start  Gentian violet BID when able to get it    Continue triamcinolone 0.1% BID for 3-4 more days    Plan for allergy patch test when symptoms resolve, orders as below    Continue Keflex as prescribed by surgical oncology     Use vanicream on skin after rash resolves    Okay to take both benadryl and zyrtec     Order for PATCH TESTS     [x] Outpatient                                              [] Inpatient: Ibrahim..../ Bed ....                                        Skin Atopy (atopic dermatitis)                  [] Yes   [] No  Rhinitis/Sinusitis:                                                            [x] Yes   [] No --> seasonal allergies (chronic coughing any time in the day ==> DDx Reflux)  Allergic Asthma:                                                              [] Yes   [x] No  Food Allergy:                                                                   [] Yes   [x] No  Leg ulcers:                                                                       [] Yes   [x] No  Hand eczema:                                                                  [] Yes   [x] No                                           Leading hand:   [] R   [] L       [] Ambidextrous                         Reason for tests (suspected allergy): adhesives (acrylates) or disinfectants (Chlorhexidine wipes)  Known previous allergies: none     Standardized panels  [x] Standard panel (40 tests)  [x] Preservatives & Antimicrobials (31 tests)  [] Emulsifiers & Additives (25 tests)   [] Perfumes/Flavours & Plants (25 tests)  [] Hairdresser panel (12 tests)  [] Rubber Chemicals (22 tests)  [x] Plastics (26 tests)  [] Colorants/Dyes/Food additives (20 tests)  [] Metals (implants/dental) (23 tests)  [] Local anaesthetics/NSAIDs (12 tests)  [x] Antibiotics & Antimycotics (14 tests)   [] Corticosteroids (15 tests)   [] Photopatch test (32 tests)   [] others: ...                             [] Patient's own products: ...  DO NOT test if chemical or  biological identity is unknown!   always ask from patient the product information and safety sheets (MSDS)      Put today on arm one test field of patch test adhesive !!     [] Patient needs consultation with Allergy team (changes of tests may apply)  [] Tests discussed with Allergy team (can have direct appointment for patch tests)        Follow-up for allergy patch testing November 19.     Staff Involved:  I, Jazmyne Tao MS4, saw and examined the patient in the presence of Dr. Simon.     Staff Physician Comments:  I was present with the medical student who participated in the service and in the documentation of the note. I have verified the history and personally performed the physical exam and medical decision making. I agree with the assessment and plan as documented in the note. I have reviewed and if necessary amended the note.        Pictures were placed in Pt's chart today for future reference.                Mj Simon MD

## 2018-11-02 NOTE — PATIENT INSTRUCTIONS
Start Gentian violet twice daily when you are able to get it    Continue Triamcinolone twice daily for 3-4 more days    Continue Keflex as prescribed by surgical oncology     Use Vanicream on dry skin after rash resolves    Okay to take both Benadryl and Zyrtec for itching    Plan for allergy patch test when symptoms resolve, week of Thanksgiving

## 2018-11-04 LAB — COPATH REPORT: NORMAL

## 2018-11-06 ENCOUNTER — ONCOLOGY VISIT (OUTPATIENT)
Dept: ONCOLOGY | Facility: CLINIC | Age: 53
End: 2018-11-06
Attending: SURGERY
Payer: COMMERCIAL

## 2018-11-06 VITALS
HEIGHT: 64 IN | BODY MASS INDEX: 30.11 KG/M2 | RESPIRATION RATE: 16 BRPM | OXYGEN SATURATION: 98 % | WEIGHT: 176.38 LBS | HEART RATE: 83 BPM | DIASTOLIC BLOOD PRESSURE: 78 MMHG | SYSTOLIC BLOOD PRESSURE: 123 MMHG | TEMPERATURE: 98.4 F

## 2018-11-06 DIAGNOSIS — C50.411 MALIGNANT NEOPLASM OF UPPER-OUTER QUADRANT OF RIGHT BREAST IN FEMALE, ESTROGEN RECEPTOR POSITIVE (H): Primary | ICD-10-CM

## 2018-11-06 DIAGNOSIS — Z17.0 MALIGNANT NEOPLASM OF UPPER-OUTER QUADRANT OF RIGHT BREAST IN FEMALE, ESTROGEN RECEPTOR POSITIVE (H): Primary | ICD-10-CM

## 2018-11-06 PROCEDURE — G0463 HOSPITAL OUTPT CLINIC VISIT: HCPCS | Mod: ZF

## 2018-11-06 ASSESSMENT — PAIN SCALES - GENERAL: PAINLEVEL: NO PAIN (0)

## 2018-11-06 NOTE — LETTER
"2018      RE: Gabriella Villarreal  [address] [facility] [place] [address]     2018    RE: Gabriella Villarreal  (: 1965)    Dear Dr. Simon    Your patient was seen for evaluation in my office.  Please find a copy of my notes for your record and review.  If you have any further questions, please feel free to contact my office.   Thank you.    Sincerely,   Kalina Oviedo MD MSc [facility] FACS    ---         FOLLOW-UP  2018    Gabriella Villarreal is a 53 year old female who returns for her 1st post-operative follow-up visit.    Malignant neoplasm of upper-outer quadrant of right breast in female, estrogen receptor positive (H)    Staging form: Breast, AJCC 8th Edition    Treatment to date:  1. Right wire-localized segmental mastectomy and sentinel lymph node biopsy (10/24/2018)    HPI:    She underwent a right wire-localized segmental mastectomy and sentinel lymph node biopsy on 10/24/2018.  She is currently 2 week(s) post-op.  Final surgical pathology showed pT1c(2)N0 invasive ductal carcinoma, grade 1, with uninvolved .    Since the procedure, she was doing well initially. However, on postoperative day 2, she developed some pustules in the right axilla that were pruritic and eventually spread to the right breast, up her neck, down the right arm and onto the abdomen.  There was associated erythema, and some weeping from the skin.  She was seen by Dr Rodriguez of Dermatology on 10/31/2018, who diagnosed her with acute contact dermatitis.  She was treated with steroids and the rash has been improving.  He advised her to undergo allergy patch testing to determine the etiologic agent.  Aside from the rash, she has been doing well.  She denies any upper extremity swelling and has good range of motion. The incisions themselves are well-healed.    /78  Pulse 83  Temp 98.4  F (36.9  C) (Oral)  Resp 16  Ht 1.626 m (5' 4\")  Wt 80 kg (176 lb 6 oz)  LMP 10/26/2018 (Exact Date)  SpO2 98%  " Breastfeeding? No  BMI 30.27 kg/m2   Physical Exam   Constitutional: She is well-developed, well-nourished, and in no distress.   Pulmonary/Chest: No respiratory distress.   Breast incision healing well.  No seroma. No cellulitis or abscess. No breast contour abnormality or nipple-areolar complex distortion.  Resolving erythematous papules over right chest, right upper arm, and upper part of the right side of abdomen.   Lymphadenopathy:   No lymphedema in bilateral upper extremities.  Axillary incision healing well without seroma, hematoma, or cellulitis.    Skin: Skin is warm and dry.     INVESTIGATIONS:    Surgical Pathology (10/24/2018):  FINAL DIAGNOSIS:   A. Right breast, lower outer quadrant, wire localized segmental   mastectomy:   - INVASIVE MAMMARY CARCINOMA OF NO SPECIAL TYPE (INVASIVE DUCTAL CARCINOMA), JAJA GRADE 1, two foci   - Focus of invasive carcinoma with prior core biopsy site: size 18 mm   - Focus of invasive carcinoma slightly anterior and superior to first focus: size 15 mm   - Ductal carcinoma in situ (DCIS), nuclear grade 2, solid, cribriform and papillary types, with lobular involvement and comedonecrosis   - DCIS involves an intraductal papilloma   - DCIS is admixed with and adjacent to both foci of invasive carcinoma, and spans an estimated 3.5 cm (extensive intraductal component)   - No lymphovascular invasion identified   - The inferior margin of this specimen is involved by invasive carcinoma and DCIS (not a final margin, see specimen C)   - Invasive carcinoma is 1 mm from the superior margin, 4 mm from the posterior margin, 5 mm from the medial margin and > 5 mm from the anterior and lateral margins   - DCIS is 2 mm from the superior margin, 3.5 mm from the medial margin, 5 mm from the posterior margin and > 5 mm from the anterior and lateral margins   - Flat epithelial atypia   - Other findings: columnar cell change, intraductal papillomas, fibrocystic change (including  microcysts with apocrine metaplasia) and usual ductal hyperplasia   - Calcifications associated with DCIS   - Prior core biopsy site changes   - Larger focus of invasive carcinoma is estrogen receptor positive (95%, strong intensity), progesterone receptor positive (98%, strong intensity) and reportedly HER2 equivocal (2+ per outside report) by immunohistochemistry and is reportedly HER2 non-amplified by FISH (HER2:CEN17 ratio 1.06 per outside report) (performed on prior core biopsy, see consult report [id number])   - HER2 FISH for the smaller focus of invasive carcinoma will be reported separately by cytogenetics   - See comment for tumor synoptic   - See microscopic description   B. Lymph node, right axillary, sentinel # 1, excision:   - One benign lymph node (0/1)   C. Right breast, new inferior margin, excision:   - Atypical ductal hyperplasia   - Fibrocystic change (including microcysts with apocrine metaplasia   - No residual invasive or in situ carcinoma     ASSESSMENT:    Gabriella Villarreal is a 53 year old female with right breast cancer, s/p surgery.    Her stage is:  Malignant neoplasm of upper-outer quadrant of right breast in female, estrogen receptor positive (H)    Staging form: Breast, AJCC 8th Edition    - Clinical: No stage assigned - Unsigned    - Pathologic: Stage IA (pT1c(2), pN0, cM0, G1, ER: Positive, [place]: Positive, HER2: Negative) - Signed by Kalina Oviedo MD on 11/6/2018     She is healing well at this point.   We reviewed the pathology today and a copy of the report was provided. The lymph node was uninvolved and the margins were involved.  No further surgery is indicated.  We reviewed that the HER2 status of the second (smaller) focus of cancer is pending. We reviewed that if it was HER2 amplified, she would need adjuvant chemotherapy and targeted therapy.  If however, it was HER2 non-amplified, then an Oncotype DX would be sent to determine the need for adjuvant chemotherapy.  She  has a consultation with Dr Berrios already scheduled, and this will be discussed in more detail at that time.  She will also need adjuvant radiation therapy.    All of the above was discussed with the patient and all questions were answered.     PLAN:  1.  Medical oncology consultation - Dr Berrios  2.  Follow up on HER2 status  3.  Will need adjuvant radiation therapy  4.  Follow up with me in 3 months    Kalina Oviedo MD MSc [facility] FACS    Division of Surgical Oncology  [facility]

## 2018-11-06 NOTE — NURSING NOTE
"Oncology Rooming Note    November 6, 2018 12:21 PM   Gabriella Villarreal is a 53 year old female who presents for:    Chief Complaint   Patient presents with     Oncology Clinic Visit     Return: Breast Ca     Initial Vitals: /78  Pulse 83  Temp 98.4  F (36.9  C) (Oral)  Resp 16  Ht 1.626 m (5' 4\")  Wt 80 kg (176 lb 6 oz)  LMP 10/26/2018 (Exact Date)  SpO2 98%  Breastfeeding? No  BMI 30.27 kg/m2 Estimated body mass index is 30.27 kg/(m^2) as calculated from the following:    Height as of this encounter: 1.626 m (5' 4\").    Weight as of this encounter: 80 kg (176 lb 6 oz). Body surface area is 1.9 meters squared.  No Pain (0) Comment: Rash    Patient's last menstrual period was 10/26/2018 (exact date).  Allergies reviewed: Yes  Medications reviewed: Yes    Medications: Medication refills not needed today.  Pharmacy name entered into [place]:    [facility] [phone]  [facility] [address] [place] [address] AVE N AT [address] [address] [place] [phone] [employer] [facility]    Clinical concerns: new concerns are that she has some sort of allergic reaction to the scrub/glue from surgery and is very intense and acute, she has a patch test schedule for 11/19 w/Dr. Simon to see what can be deteremined to be the cause. Dr. Oviedo was notified.    10 minutes for nursing intake (face to face time)     Toyin Oreilly CMA              "

## 2018-11-06 NOTE — PROGRESS NOTES
"FOLLOW-UP  Nov 6, 2018    Gabriella Villarreal is a 53 year old female who returns for her 1st post-operative follow-up visit.    Malignant neoplasm of upper-outer quadrant of right breast in female, estrogen receptor positive (H)    Staging form: Breast, AJCC 8th Edition    Treatment to date:  1. Right wire-localized segmental mastectomy and sentinel lymph node biopsy (10/24/2018)    HPI:    She underwent a right wire-localized segmental mastectomy and sentinel lymph node biopsy on 10/24/2018.  She is currently 2 week(s) post-op.  Final surgical pathology showed pT1c(2)N0 invasive ductal carcinoma, grade 1, with uninvolved .    Since the procedure, she was doing well initially. However, on postoperative day 2, she developed some pustules in the right axilla that were pruritic and eventually spread to the right breast, up her neck, down the right arm and onto the abdomen.  There was associated erythema, and some weeping from the skin.  She was seen by Dr Rodriguez of Dermatology on 10/31/2018, who diagnosed her with acute contact dermatitis.  She was treated with steroids and the rash has been improving.  He advised her to undergo allergy patch testing to determine the etiologic agent.  Aside from the rash, she has been doing well.  She denies any upper extremity swelling and has good range of motion. The incisions themselves are well-healed.    /78  Pulse 83  Temp 98.4  F (36.9  C) (Oral)  Resp 16  Ht 1.626 m (5' 4\")  Wt 80 kg (176 lb 6 oz)  LMP 10/26/2018 (Exact Date)  SpO2 98%  Breastfeeding? No  BMI 30.27 kg/m2   Physical Exam   Constitutional: She is well-developed, well-nourished, and in no distress.   Pulmonary/Chest: No respiratory distress.   Breast incision healing well.  No seroma. No cellulitis or abscess. No breast contour abnormality or nipple-areolar complex distortion.  Resolving erythematous papules over right chest, right upper arm, and upper part of the right side of abdomen. "   Lymphadenopathy:   No lymphedema in bilateral upper extremities.  Axillary incision healing well without seroma, hematoma, or cellulitis.    Skin: Skin is warm and dry.     INVESTIGATIONS:    Surgical Pathology (10/24/2018):  FINAL DIAGNOSIS:   A. Right breast, lower outer quadrant, wire localized segmental   mastectomy:   - INVASIVE MAMMARY CARCINOMA OF NO SPECIAL TYPE (INVASIVE DUCTAL CARCINOMA), JAJA GRADE 1, two foci   - Focus of invasive carcinoma with prior core biopsy site: size 18 mm   - Focus of invasive carcinoma slightly anterior and superior to first focus: size 15 mm   - Ductal carcinoma in situ (DCIS), nuclear grade 2, solid, cribriform and papillary types, with lobular involvement and comedonecrosis   - DCIS involves an intraductal papilloma   - DCIS is admixed with and adjacent to both foci of invasive carcinoma, and spans an estimated 3.5 cm (extensive intraductal component)   - No lymphovascular invasion identified   - The inferior margin of this specimen is involved by invasive carcinoma and DCIS (not a final margin, see specimen C)   - Invasive carcinoma is 1 mm from the superior margin, 4 mm from the posterior margin, 5 mm from the medial margin and > 5 mm from the anterior and lateral margins   - DCIS is 2 mm from the superior margin, 3.5 mm from the medial margin, 5 mm from the posterior margin and > 5 mm from the anterior and lateral margins   - Flat epithelial atypia   - Other findings: columnar cell change, intraductal papillomas, fibrocystic change (including microcysts with apocrine metaplasia) and usual ductal hyperplasia   - Calcifications associated with DCIS   - Prior core biopsy site changes   - Larger focus of invasive carcinoma is estrogen receptor positive (95%, strong intensity), progesterone receptor positive (98%, strong intensity) and reportedly HER2 equivocal (2+ per outside report) by immunohistochemistry and is reportedly HER2 non-amplified by FISH (HER2:CEN17 ratio  1.06 per outside report) (performed on prior core biopsy, see consult report [id number])   - HER2 FISH for the smaller focus of invasive carcinoma will be reported separately by cytogenetics   - See comment for tumor synoptic   - See microscopic description   B. Lymph node, right axillary, sentinel # 1, excision:   - One benign lymph node (0/1)   C. Right breast, new inferior margin, excision:   - Atypical ductal hyperplasia   - Fibrocystic change (including microcysts with apocrine metaplasia   - No residual invasive or in situ carcinoma     ASSESSMENT:    Gabriella Villarreal is a 53 year old female with right breast cancer, s/p surgery.    Her stage is:  Malignant neoplasm of upper-outer quadrant of right breast in female, estrogen receptor positive (H)    Staging form: Breast, AJCC 8th Edition    - Clinical: No stage assigned - Unsigned    - Pathologic: Stage IA (pT1c(2), pN0, cM0, G1, ER: Positive, IA: Positive, HER2: Negative) - Signed by Kalina Oviedo MD on 11/6/2018     She is healing well at this point.   We reviewed the pathology today and a copy of the report was provided. The lymph node was uninvolved and the margins were involved.  No further surgery is indicated.  We reviewed that the HER2 status of the second (smaller) focus of cancer is pending. We reviewed that if it was HER2 amplified, she would need adjuvant chemotherapy and targeted therapy.  If however, it was HER2 non-amplified, then an Oncotype DX would be sent to determine the need for adjuvant chemotherapy.  She has a consultation with Dr Berrios already scheduled, and this will be discussed in more detail at that time.  She will also need adjuvant radiation therapy.    All of the above was discussed with the patient and all questions were answered.     PLAN:  1.  Medical oncology consultation - Dr Berrios  2.  Follow up on HER2 status  3.  Will need adjuvant radiation therapy  4.  Follow up with me in 3 months    Kalina Oviedo MD MSc [facility]  FACS    Division of Surgical Oncology  [facility]

## 2018-11-06 NOTE — LETTER
"2018      RE: Gabriella Villarreal  [address] [facility] [place] [address]     2018      RE: Gabriella Villarreal  (: 1965)    Dear Dr. Berrios:    Thank you for seeing Gabriella Villarreal regarding right breast cancer.  Please find a copy of my notes for your record and review.  If you have any further questions, please feel free to contact my office.      Sincerely,   Kalina Oviedo MD MSc [facility] FACS    ---    FOLLOW-UP  2018    Gabriella Villarreal is a 53 year old female who returns for her 1st post-operative follow-up visit.    Malignant neoplasm of upper-outer quadrant of right breast in female, estrogen receptor positive (H)    Staging form: Breast, AJCC 8th Edition    Treatment to date:  1. Right wire-localized segmental mastectomy and sentinel lymph node biopsy (10/24/2018)    HPI:    She underwent a right wire-localized segmental mastectomy and sentinel lymph node biopsy on 10/24/2018.  She is currently 2 week(s) post-op.  Final surgical pathology showed pT1c(2)N0 invasive ductal carcinoma, grade 1, with uninvolved .    Since the procedure, she was doing well initially. However, on postoperative day 2, she developed some pustules in the right axilla that were pruritic and eventually spread to the right breast, up her neck, down the right arm and onto the abdomen.  There was associated erythema, and some weeping from the skin.  She was seen by Dr Rodriguez of Dermatology on 10/31/2018, who diagnosed her with acute contact dermatitis.  She was treated with steroids and the rash has been improving.  He advised her to undergo allergy patch testing to determine the etiologic agent.  Aside from the rash, she has been doing well.  She denies any upper extremity swelling and has good range of motion. The incisions themselves are well-healed.    /78  Pulse 83  Temp 98.4  F (36.9  C) (Oral)  Resp 16  Ht 1.626 m (5' 4\")  Wt 80 kg (176 lb 6 oz)  LMP 10/26/2018 (Exact Date)  SpO2 98%  " Breastfeeding? No  BMI 30.27 kg/m2   Physical Exam   Constitutional: She is well-developed, well-nourished, and in no distress.   Pulmonary/Chest: No respiratory distress.   Breast incision healing well.  No seroma. No cellulitis or abscess. No breast contour abnormality or nipple-areolar complex distortion.  Resolving erythematous papules over right chest, right upper arm, and upper part of the right side of abdomen.   Lymphadenopathy:   No lymphedema in bilateral upper extremities.  Axillary incision healing well without seroma, hematoma, or cellulitis.    Skin: Skin is warm and dry.     INVESTIGATIONS:    Surgical Pathology (10/24/2018):  FINAL DIAGNOSIS:   A. Right breast, lower outer quadrant, wire localized segmental   mastectomy:   - INVASIVE MAMMARY CARCINOMA OF NO SPECIAL TYPE (INVASIVE DUCTAL CARCINOMA), JAJA GRADE 1, two foci   - Focus of invasive carcinoma with prior core biopsy site: size 18 mm   - Focus of invasive carcinoma slightly anterior and superior to first focus: size 15 mm   - Ductal carcinoma in situ (DCIS), nuclear grade 2, solid, cribriform and papillary types, with lobular involvement and comedonecrosis   - DCIS involves an intraductal papilloma   - DCIS is admixed with and adjacent to both foci of invasive carcinoma, and spans an estimated 3.5 cm (extensive intraductal component)   - No lymphovascular invasion identified   - The inferior margin of this specimen is involved by invasive carcinoma and DCIS (not a final margin, see specimen C)   - Invasive carcinoma is 1 mm from the superior margin, 4 mm from the posterior margin, 5 mm from the medial margin and > 5 mm from the anterior and lateral margins   - DCIS is 2 mm from the superior margin, 3.5 mm from the medial margin, 5 mm from the posterior margin and > 5 mm from the anterior and lateral margins   - Flat epithelial atypia   - Other findings: columnar cell change, intraductal papillomas, fibrocystic change (including  microcysts with apocrine metaplasia) and usual ductal hyperplasia   - Calcifications associated with DCIS   - Prior core biopsy site changes   - Larger focus of invasive carcinoma is estrogen receptor positive (95%, strong intensity), progesterone receptor positive (98%, strong intensity) and reportedly HER2 equivocal (2+ per outside report) by immunohistochemistry and is reportedly HER2 non-amplified by FISH (HER2:CEN17 ratio 1.06 per outside report) (performed on prior core biopsy, see consult report [id number])   - HER2 FISH for the smaller focus of invasive carcinoma will be reported separately by cytogenetics   - See comment for tumor synoptic   - See microscopic description   B. Lymph node, right axillary, sentinel # 1, excision:   - One benign lymph node (0/1)   C. Right breast, new inferior margin, excision:   - Atypical ductal hyperplasia   - Fibrocystic change (including microcysts with apocrine metaplasia   - No residual invasive or in situ carcinoma     ASSESSMENT:    Gabriella Villarreal is a 53 year old female with right breast cancer, s/p surgery.    Her stage is:  Malignant neoplasm of upper-outer quadrant of right breast in female, estrogen receptor positive (H)    Staging form: Breast, AJCC 8th Edition    - Clinical: No stage assigned - Unsigned    - Pathologic: Stage IA (pT1c(2), pN0, cM0, G1, ER: Positive, NM: Positive, HER2: Negative) - Signed by Kalina Oviedo MD on 11/6/2018     She is healing well at this point.   We reviewed the pathology today and a copy of the report was provided. The lymph node was uninvolved and the margins were involved.  No further surgery is indicated.  We reviewed that the HER2 status of the second (smaller) focus of cancer is pending. We reviewed that if it was HER2 amplified, she would need adjuvant chemotherapy and targeted therapy.  If however, it was HER2 non-amplified, then an Oncotype DX would be sent to determine the need for adjuvant chemotherapy.  She  has a consultation with Dr Berrios already scheduled, and this will be discussed in more detail at that time.  She will also need adjuvant radiation therapy.    All of the above was discussed with the patient and all questions were answered.     PLAN:  1.  Medical oncology consultation - Dr Berrios  2.  Follow up on HER2 status  3.  Will need adjuvant radiation therapy  4.  Follow up with me in 3 months    Kalina Oviedo MD MSc [facility] FACS    Division of Surgical Oncology  [facility]

## 2018-11-06 NOTE — LETTER
2018      RE: Gabriella Villarreal  [address]  [facility] [address]     2018    MIKAL GRANDEN   [place] [facility] [address]  [facility] [address]    RE: Gabriella Villarreal  (: 1965)    Dear Dr. Mikal CHAPIN Fredrick:    Your patient was seen for evaluation in my office.  Please find a copy of my notes for your record and review.  If you have any further questions, please feel free to contact my office.   Thank you for your kind referral.    Sincerely,   Kalina Oviedo MD MSc [facility] FACS    ---     FOLLOW-UP  2018    Gabriella Villarreal is a 53 year old female who returns for her 1st post-operative follow-up visit.    Malignant neoplasm of upper-outer quadrant of right breast in female, estrogen receptor positive (H)    Staging form: Breast, AJCC 8th Edition    Treatment to date:  1. Right wire-localized segmental mastectomy and sentinel lymph node biopsy (10/24/2018)    HPI:    She underwent a right wire-localized segmental mastectomy and sentinel lymph node biopsy on 10/24/2018.  She is currently 2 week(s) post-op.  Final surgical pathology showed pT1c(2)N0 invasive ductal carcinoma, grade 1, with uninvolved .    Since the procedure, she was doing well initially. However, on postoperative day 2, she developed some pustules in the right axilla that were pruritic and eventually spread to the right breast, up her neck, down the right arm and onto the abdomen.  There was associated erythema, and some weeping from the skin.  She was seen by Dr Rodriguez of Dermatology on 10/31/2018, who diagnosed her with acute contact dermatitis.  She was treated with steroids and the rash has been improving.  He advised her to undergo allergy patch testing to determine the etiologic agent.  Aside from the rash, she has been doing well.  She denies any upper extremity swelling and has good range of motion. The incisions themselves are well-healed.    /78  Pulse 83  Temp 98.4  F (36.9  C) (Oral)  Resp  "16  Ht 1.626 m (5' 4\")  Wt 80 kg (176 lb 6 oz)  LMP 10/26/2018 (Exact Date)  SpO2 98%  Breastfeeding? No  BMI 30.27 kg/m2   Physical Exam   Constitutional: She is well-developed, well-nourished, and in no distress.   Pulmonary/Chest: No respiratory distress.   Breast incision healing well.  No seroma. No cellulitis or abscess. No breast contour abnormality or nipple-areolar complex distortion.  Resolving erythematous papules over right chest, right upper arm, and upper part of the right side of abdomen.   Lymphadenopathy:   No lymphedema in bilateral upper extremities.  Axillary incision healing well without seroma, hematoma, or cellulitis.    Skin: Skin is warm and dry.     INVESTIGATIONS:    Surgical Pathology (10/24/2018):  FINAL DIAGNOSIS:   A. Right breast, lower outer quadrant, wire localized segmental   mastectomy:   - INVASIVE MAMMARY CARCINOMA OF NO SPECIAL TYPE (INVASIVE DUCTAL CARCINOMA), JAJA GRADE 1, two foci   - Focus of invasive carcinoma with prior core biopsy site: size 18 mm   - Focus of invasive carcinoma slightly anterior and superior to first focus: size 15 mm   - Ductal carcinoma in situ (DCIS), nuclear grade 2, solid, cribriform and papillary types, with lobular involvement and comedonecrosis   - DCIS involves an intraductal papilloma   - DCIS is admixed with and adjacent to both foci of invasive carcinoma, and spans an estimated 3.5 cm (extensive intraductal component)   - No lymphovascular invasion identified   - The inferior margin of this specimen is involved by invasive carcinoma and DCIS (not a final margin, see specimen C)   - Invasive carcinoma is 1 mm from the superior margin, 4 mm from the posterior margin, 5 mm from the medial margin and > 5 mm from the anterior and lateral margins   - DCIS is 2 mm from the superior margin, 3.5 mm from the medial margin, 5 mm from the posterior margin and > 5 mm from the anterior and lateral margins   - Flat epithelial atypia   - Other " findings: columnar cell change, intraductal papillomas, fibrocystic change (including microcysts with apocrine metaplasia) and usual ductal hyperplasia   - Calcifications associated with DCIS   - Prior core biopsy site changes   - Larger focus of invasive carcinoma is estrogen receptor positive (95%, strong intensity), progesterone receptor positive (98%, strong intensity) and reportedly HER2 equivocal (2+ per outside report) by immunohistochemistry and is reportedly HER2 non-amplified by FISH (HER2:CEN17 ratio 1.06 per outside report) (performed on prior core biopsy, see consult report [id number])   - HER2 FISH for the smaller focus of invasive carcinoma will be reported separately by cytogenetics   - See comment for tumor synoptic   - See microscopic description   B. Lymph node, right axillary, sentinel # 1, excision:   - One benign lymph node (0/1)   C. Right breast, new inferior margin, excision:   - Atypical ductal hyperplasia   - Fibrocystic change (including microcysts with apocrine metaplasia   - No residual invasive or in situ carcinoma     ASSESSMENT:    Gabriella Villarreal is a 53 year old female with right breast cancer, s/p surgery.    Her stage is:  Malignant neoplasm of upper-outer quadrant of right breast in female, estrogen receptor positive (H)    Staging form: Breast, AJCC 8th Edition    - Clinical: No stage assigned - Unsigned    - Pathologic: Stage IA (pT1c(2), pN0, cM0, G1, ER: Positive, NE: Positive, HER2: Negative) - Signed by Kalina Oviedo MD on 11/6/2018     She is healing well at this point.   We reviewed the pathology today and a copy of the report was provided. The lymph node was uninvolved and the margins were involved.  No further surgery is indicated.  We reviewed that the HER2 status of the second (smaller) focus of cancer is pending. We reviewed that if it was HER2 amplified, she would need adjuvant chemotherapy and targeted therapy.  If however, it was HER2 non-amplified, then  an Oncotype DX would be sent to determine the need for adjuvant chemotherapy.  She has a consultation with Dr Berrios already scheduled, and this will be discussed in more detail at that time.  She will also need adjuvant radiation therapy.    All of the above was discussed with the patient and all questions were answered.     PLAN:  1.  Medical oncology consultation - Dr Berrios  2.  Follow up on HER2 status  3.  Will need adjuvant radiation therapy  4.  Follow up with me in 3 months    Kalina Oviedo MD MSc [facility] FACS    Division of Surgical Oncology  [facility]

## 2018-11-06 NOTE — Clinical Note
"11/6/2018       RE: Gabriella Villarreal  [address] [facility] [place] [address]     Dear Colleague,    Thank you for referring your patient, Gabriella Villarreal, to the [facility] at [facility]. Please see a copy of my visit note below.    FOLLOW-UP  Nov 6, 2018    Gabriella Villarreal is a 53 year old female who returns for her 1st post-operative follow-up visit.    Malignant neoplasm of upper-outer quadrant of right breast in female, estrogen receptor positive (H)    Staging form: Breast, AJCC 8th Edition    Treatment to date:  1. Right wire-localized segmental mastectomy and sentinel lymph node biopsy (10/24/2018)    HPI:    She underwent a right wire-localized segmental mastectomy and sentinel lymph node biopsy on 10/24/2018.  She is currently 2 week(s) post-op.  Final surgical pathology showed pT1c(2)N0 invasive ductal carcinoma, grade 1, with uninvolved .    Since the procedure, she was doing well initially. However, on postoperative day 2, she developed some pustules in the right axilla that were pruritic and eventually spread to the right breast, up her neck, down the right arm and onto the abdomen.  There was associated erythema, and some weeping from the skin.  She was seen by Dr Rodriguez of Dermatology on 10/31/2018, who diagnosed her with acute contact dermatitis.  She was treated with steroids and the rash has been improving.  He advised her to undergo allergy patch testing to determine the etiologic agent.  Aside from the rash, she has been doing well.  She denies any upper extremity swelling and has good range of motion. The incisions themselves are well-healed.    /78  Pulse 83  Temp 98.4  F (36.9  C) (Oral)  Resp 16  Ht 1.626 m (5' 4\")  Wt 80 kg (176 lb 6 oz)  LMP 10/26/2018 (Exact Date)  SpO2 98%  Breastfeeding? No  BMI 30.27 kg/m2   Physical Exam   Constitutional: She is well-developed, well-nourished, and in no distress. "   Pulmonary/Chest: No respiratory distress.   Breast incision healing well.  No seroma. No cellulitis or abscess. No breast contour abnormality or nipple-areolar complex distortion.  Resolving erythematous papules over right chest, right upper arm, and upper part of the right side of abdomen.   Lymphadenopathy:   No lymphedema in bilateral upper extremities.  Axillary incision healing well without seroma, hematoma, or cellulitis.    Skin: Skin is warm and dry.     INVESTIGATIONS:    Surgical Pathology (10/24/2018):  FINAL DIAGNOSIS:   A. Right breast, lower outer quadrant, wire localized segmental   mastectomy:   - INVASIVE MAMMARY CARCINOMA OF NO SPECIAL TYPE (INVASIVE DUCTAL CARCINOMA), JAJA GRADE 1, two foci   - Focus of invasive carcinoma with prior core biopsy site: size 18 mm   - Focus of invasive carcinoma slightly anterior and superior to first focus: size 15 mm   - Ductal carcinoma in situ (DCIS), nuclear grade 2, solid, cribriform and papillary types, with lobular involvement and comedonecrosis   - DCIS involves an intraductal papilloma   - DCIS is admixed with and adjacent to both foci of invasive carcinoma, and spans an estimated 3.5 cm (extensive intraductal component)   - No lymphovascular invasion identified   - The inferior margin of this specimen is involved by invasive carcinoma and DCIS (not a final margin, see specimen C)   - Invasive carcinoma is 1 mm from the superior margin, 4 mm from the posterior margin, 5 mm from the medial margin and > 5 mm from the anterior and lateral margins   - DCIS is 2 mm from the superior margin, 3.5 mm from the medial margin, 5 mm from the posterior margin and > 5 mm from the anterior and lateral margins   - Flat epithelial atypia   - Other findings: columnar cell change, intraductal papillomas, fibrocystic change (including microcysts with apocrine metaplasia) and usual ductal hyperplasia   - Calcifications associated with DCIS   - Prior core biopsy site  changes   - Larger focus of invasive carcinoma is estrogen receptor positive (95%, strong intensity), progesterone receptor positive (98%, strong intensity) and reportedly HER2 equivocal (2+ per outside report) by immunohistochemistry and is reportedly HER2 non-amplified by FISH (HER2:CEN17 ratio 1.06 per outside report) (performed on prior core biopsy, see consult report [id number])   - HER2 FISH for the smaller focus of invasive carcinoma will be reported separately by cytogenetics   - See comment for tumor synoptic   - See microscopic description   B. Lymph node, right axillary, sentinel # 1, excision:   - One benign lymph node (0/1)   C. Right breast, new inferior margin, excision:   - Atypical ductal hyperplasia   - Fibrocystic change (including microcysts with apocrine metaplasia   - No residual invasive or in situ carcinoma     ASSESSMENT:    Gabriella Villarreal is a 53 year old female with right breast cancer, s/p surgery.    Her stage is:  Malignant neoplasm of upper-outer quadrant of right breast in female, estrogen receptor positive (H)    Staging form: Breast, AJCC 8th Edition    - Clinical: No stage assigned - Unsigned    - Pathologic: Stage IA (pT1c(2), pN0, cM0, G1, ER: Positive, MT: Positive, HER2: Negative) - Signed by Kalina Oviedo MD on 11/6/2018     She is healing well at this point.   We reviewed the pathology today and a copy of the report was provided. The lymph node was uninvolved and the margins were involved.  No further surgery is indicated.  We reviewed that the HER2 status of the second (smaller) focus of cancer is pending. We reviewed that if it was HER2 amplified, she would need adjuvant chemotherapy and targeted therapy.  If however, it was HER2 non-amplified, then an Oncotype DX would be sent to determine the need for adjuvant chemotherapy.  She has a consultation with Dr Berrios already scheduled, and this will be discussed in more detail at that time.  She will also need  adjuvant radiation therapy.    All of the above was discussed with the patient and all questions were answered.     PLAN:  1.  Medical oncology consultation - Dr Berrios  2.  Follow up on HER2 status  3.  Will need adjuvant radiation therapy  4.  Follow up with me in 3 months    Kalina Oviedo MD MSc [facility] FACS    Division of Surgical Oncology  [facility]       Again, thank you for allowing me to participate in the care of your patient.      Sincerely,    Kalina Oviedo MD

## 2018-11-06 NOTE — Clinical Note
"11/6/2018      RE: Gabriella Villarreal  [address] [facility] [address]       FOLLOW-UP  Nov 6, 2018    Gabriella Villarreal is a 53 year old female who returns for her 1st post-operative follow-up visit.    Malignant neoplasm of upper-outer quadrant of right breast in female, estrogen receptor positive (H)    Staging form: Breast, AJCC 8th Edition    Treatment to date:  1. Right wire-localized segmental mastectomy and sentinel lymph node biopsy (10/24/2018)    HPI:    She underwent a right wire-localized segmental mastectomy and sentinel lymph node biopsy on 10/24/2018.  She is currently 2 week(s) post-op.  Final surgical pathology showed pT1c(2)N0 invasive ductal carcinoma, grade 1, with uninvolved .    Since the procedure, she was doing well initially. However, on postoperative day 2, she developed some pustules in the right axilla that were pruritic and eventually spread to the right breast, up her neck, down the right arm and onto the abdomen.  There was associated erythema, and some weeping from the skin.  She was seen by Dr Rodriguez of Dermatology on 10/31/2018, who diagnosed her with acute contact dermatitis.  She was treated with steroids and the rash has been improving.  He advised her to undergo allergy patch testing to determine the etiologic agent.  Aside from the rash, she has been doing well.  She denies any upper extremity swelling and has good range of motion. The incisions themselves are well-healed.    /78  Pulse 83  Temp 98.4  F (36.9  C) (Oral)  Resp 16  Ht 1.626 m (5' 4\")  Wt 80 kg (176 lb 6 oz)  LMP 10/26/2018 (Exact Date)  SpO2 98%  Breastfeeding? No  BMI 30.27 kg/m2   Physical Exam   Constitutional: She is well-developed, well-nourished, and in no distress.   Pulmonary/Chest: No respiratory distress.   Breast incision healing well.  No seroma. No cellulitis or abscess. No breast contour abnormality or nipple-areolar complex distortion.  Resolving erythematous papules " over right chest, right upper arm, and upper part of the right side of abdomen.   Lymphadenopathy:   No lymphedema in bilateral upper extremities.  Axillary incision healing well without seroma, hematoma, or cellulitis.    Skin: Skin is warm and dry.     INVESTIGATIONS:    Surgical Pathology (10/24/2018):  FINAL DIAGNOSIS:   A. Right breast, lower outer quadrant, wire localized segmental   mastectomy:   - INVASIVE MAMMARY CARCINOMA OF NO SPECIAL TYPE (INVASIVE DUCTAL CARCINOMA), JAJA GRADE 1, two foci   - Focus of invasive carcinoma with prior core biopsy site: size 18 mm   - Focus of invasive carcinoma slightly anterior and superior to first focus: size 15 mm   - Ductal carcinoma in situ (DCIS), nuclear grade 2, solid, cribriform and papillary types, with lobular involvement and comedonecrosis   - DCIS involves an intraductal papilloma   - DCIS is admixed with and adjacent to both foci of invasive carcinoma, and spans an estimated 3.5 cm (extensive intraductal component)   - No lymphovascular invasion identified   - The inferior margin of this specimen is involved by invasive carcinoma and DCIS (not a final margin, see specimen C)   - Invasive carcinoma is 1 mm from the superior margin, 4 mm from the posterior margin, 5 mm from the medial margin and > 5 mm from the anterior and lateral margins   - DCIS is 2 mm from the superior margin, 3.5 mm from the medial margin, 5 mm from the posterior margin and > 5 mm from the anterior and lateral margins   - Flat epithelial atypia   - Other findings: columnar cell change, intraductal papillomas, fibrocystic change (including microcysts with apocrine metaplasia) and usual ductal hyperplasia   - Calcifications associated with DCIS   - Prior core biopsy site changes   - Larger focus of invasive carcinoma is estrogen receptor positive (95%, strong intensity), progesterone receptor positive (98%, strong intensity) and reportedly HER2 equivocal (2+ per outside report) by  immunohistochemistry and is reportedly HER2 non-amplified by FISH (HER2:CEN17 ratio 1.06 per outside report) (performed on prior core biopsy, see consult report [id number])   - HER2 FISH for the smaller focus of invasive carcinoma will be reported separately by cytogenetics   - See comment for tumor synoptic   - See microscopic description   B. Lymph node, right axillary, sentinel # 1, excision:   - One benign lymph node (0/1)   C. Right breast, new inferior margin, excision:   - Atypical ductal hyperplasia   - Fibrocystic change (including microcysts with apocrine metaplasia   - No residual invasive or in situ carcinoma     ASSESSMENT:    Gabriella Villarreal is a 53 year old female with right breast cancer, s/p surgery.    Her stage is:  Malignant neoplasm of upper-outer quadrant of right breast in female, estrogen receptor positive (H)    Staging form: Breast, AJCC 8th Edition    - Clinical: No stage assigned - Unsigned    - Pathologic: Stage IA (pT1c(2), pN0, cM0, G1, ER: Positive, PA: Positive, HER2: Negative) - Signed by Kalina Oviedo MD on 11/6/2018     She is healing well at this point.   We reviewed the pathology today and a copy of the report was provided. The lymph node was uninvolved and the margins were involved.  No further surgery is indicated.  We reviewed that the HER2 status of the second (smaller) focus of cancer is pending. We reviewed that if it was HER2 amplified, she would need adjuvant chemotherapy and targeted therapy.  If however, it was HER2 non-amplified, then an Oncotype DX would be sent to determine the need for adjuvant chemotherapy.  She has a consultation with Dr Berrios already scheduled, and this will be discussed in more detail at that time.  She will also need adjuvant radiation therapy.    All of the above was discussed with the patient and all questions were answered.     PLAN:  1.  Medical oncology consultation - Dr Berrios  2.  Follow up on HER2 status  3.  Will need adjuvant  radiation therapy  4.  Follow up with me in 3 months    Kalina Oviedo MD MSc FRCSC FACS    Division of Surgical Oncology  [facility]       Kalina Oviedo MD

## 2018-11-06 NOTE — MR AVS SNAPSHOT
After Visit Summary   11/6/2018    Gabriella Villarreal    MRN: [id number]           Patient Information     Date Of Birth          1965        Visit Information        Provider Department      11/6/2018 12:45 PM Kalina Oviedo MD [facility]        Today's Diagnoses     Malignant neoplasm of upper-outer quadrant of right breast in female, estrogen receptor positive (H)    -  1       Follow-ups after your visit        Your next 10 appointments already scheduled     Nov 19, 2018  1:00 PM CST   (Arrive by 12:45 PM)   New Patient Visit with Karen Berrios MD   [facility] ([facility])    [address]  [address]  [facility] [id number] [phone]            Nov 19, 2018  2:00 PM CST   (Arrive by 1:45 PM)   Return Allergy with Mj Simon MD   [facility] ([facility])    [address] [place] [address]  3rd Floor  [facility] [id number] [phone]            Nov 21, 2018  1:15 PM CST   (Arrive by 1:00 PM)   Return Allergy with Mj Simon MD   [facility] ([facility])    [address] [place] [address]  3rd Floor  [facility] [id number] [phone]            Nov 23, 2018  9:00 AM CST   (Arrive by 8:45 AM)   Return Allergy with Mj Simon MD   [facility] ([facility])    [address] [place] [address]  3rd Floor  [facility] [id number] [phone]            Feb 08, 2019  1:15 PM CST   (Arrive by 1:00 PM)   Post-Op with Kalina Oviedo MD   [facility] ([facility])    [address]  [address]  [facility] [place] [facility] [address] [phone]              Who to contact     If you have questions or need follow up information about today's clinic visit or your schedule please contact [facility] directly at [phone].  Normal or non-critical lab and imaging results will  "be communicated to you by MyChart, letter or phone within 4 business days after the clinic has received the results. If you do not hear from us within 7 days, please contact the clinic through [facility] or phone. If you have a critical or abnormal lab result, we will notify you by phone as soon as possible.  Submit refill requests through [facility] or call your pharmacy and they will forward the refill request to us. Please allow 3 business days for your refill to be completed.          Additional Information About Your Visit        [facility] Information     [facility] gives you secure access to your electronic health record. If you see a primary care provider, you can also send messages to your care team and make appointments. If you have questions, please call your primary care clinic.  If you do not have a primary care provider, please call [phone] and they will assist you.        Care EveryWhere ID     This is your Care EveryWhere ID. This could be used by other organizations to access your [facility] medical records  [id number]        Your Vitals Were     Pulse Temperature Respirations Height Last Period Pulse Oximetry    83 98.4  F (36.9  C) (Oral) 16 1.626 m (5' 4\") 10/26/2018 (Exact Date) 98%    Breastfeeding? BMI (Body Mass Index)                No 30.27 kg/m2           Blood Pressure from Last 3 Encounters:   11/06/18 123/78   10/27/18 129/84   10/24/18 125/81    Weight from Last 3 Encounters:   11/06/18 80 kg (176 lb 6 oz)   10/27/18 81.4 kg (179 lb 8 oz)   10/24/18 82.5 kg (181 lb 14.4 oz)              Today, you had the following     No orders found for display       Primary Care Provider Office Phone # Fax #    Anna CHAPIN Renan [phone]       [facility] [address] [address]        Equal Access to Services     [facility] PELON : Aiden Banks, waaxda luqadaha, qaybta kaalmada antonioyacecilia, clyde gauthier. So wa " [phone].    ATENCIÓN: Si arelis paige, tiene a luna disposición servicios gratuitos de asistencia lingüística. Pastora george [phone].    We comply with applicable federal civil rights laws and [place] laws. We do not discriminate on the basis of race, color, national origin, age, disability, sex, sexual orientation, or gender identity.            Thank you!     Thank you for choosing [facility]  for your care. Our goal is always to provide you with excellent care. Hearing back from our patients is one way we can continue to improve our services. Please take a few minutes to complete the written survey that you may receive in the mail after your visit with us. Thank you!             Your Updated Medication List - Protect others around you: Learn how to safely use, store and throw away your medicines at www.disposemymeds.org.          This list is accurate as of 11/6/18  1:41 PM.  Always use your most recent med list.                   Brand Name Dispense Instructions for use Diagnosis    ACETAMINOPHEN PO      Take 1,000 mg by mouth        albuterol 108 (90 Base) MCG/ACT inhaler    PROAIR HFA/PROVENTIL HFA/VENTOLIN HFA     Inhale 2-4 puffs into the lungs        cephALEXin 500 MG capsule    KEFLEX    28 capsule    Take 1 capsule (500 mg) by mouth 4 times daily    Malignant neoplasm of upper-outer quadrant of right breast in female, estrogen receptor positive (H)       chlorpheniramine 4 MG tablet    CHLOR-TRIMETON     Take 4 mg by mouth        diphenhydrAMINE 25 MG tablet    BENADRYL          emollient cream     57 g    Apply topically as needed for other (for dry skin)    Allergic contact dermatitis, unspecified trigger       fluticasone 50 MCG/ACT spray    FLONASE     INSTILL 2 SPRAYS INTO BOTH NOSTRILS DAILY.        * gentian violet 2 % solution     59 mL    Apply 1 mL topically 2 times daily For affected area 1 - 2 times a day. Apply to area before applying triamcinolone cream    Allergic contact  dermatitis, unspecified trigger       * gentian violet 1 % solution     59 mL    Apply 0.5 mLs topically 3 times daily    Allergic contact dermatitis due to other agents       ibuprofen 200 MG tablet    ADVIL/MOTRIN     Take 200 mg by mouth        levothyroxine 100 MCG tablet    SYNTHROID/LEVOTHROID     Take 100 mcg by mouth every morning        oxyCODONE IR 5 MG tablet    ROXICODONE    6 tablet    Take 1-2 tablets (5-10 mg) by mouth every 4 hours as needed for moderate to severe pain    Malignant neoplasm of lower-outer quadrant of right breast of female, estrogen receptor positive (H)       ranitidine 150 MG tablet    ZANTAC    60 tablet    Take 1 tablet (150 mg) by mouth 2 times daily        senna-docusate 8.6-50 MG per tablet    SENOKOT-S;PERICOLACE    30 tablet    Take 1-2 tablets by mouth 2 times daily    Malignant neoplasm of lower-outer quadrant of right breast of female, estrogen receptor positive (H)       triamcinolone 0.1 % cream    KENALOG    80 g    Apply topically 2 times daily    Allergic contact dermatitis, unspecified trigger       * Notice:  This list has 2 medication(s) that are the same as other medications prescribed for you. Read the directions carefully, and ask your doctor or other care provider to review them with you.

## 2018-11-07 LAB — COPATH REPORT: NORMAL

## 2018-11-12 NOTE — TELEPHONE ENCOUNTER
Date of appointment: 11/19/18   Diagnosis/reason for appointment:Malignant neoplasm of upper-outer quadrant of right breast in female, estrogen receptor positive   Referring provider/facility:  Who called:    Recent Studies  Imaging:  Pathology:  Labs:  Previous chemo/radiation (if known):    Records requested from:  Records received from:    Additional information:Records in Epic

## 2018-11-13 LAB — LAB SCANNED RESULT: NORMAL

## 2018-11-15 LAB — LAB SCANNED RESULT: NORMAL

## 2018-11-19 ENCOUNTER — OFFICE VISIT (OUTPATIENT)
Dept: DERMATOLOGY | Facility: CLINIC | Age: 53
End: 2018-11-19
Payer: COMMERCIAL

## 2018-11-19 ENCOUNTER — DOCUMENTATION ONLY (OUTPATIENT)
Dept: DERMATOLOGY | Facility: CLINIC | Age: 53
End: 2018-11-19

## 2018-11-19 ENCOUNTER — PRE VISIT (OUTPATIENT)
Dept: ONCOLOGY | Facility: CLINIC | Age: 53
End: 2018-11-19

## 2018-11-19 ENCOUNTER — ONCOLOGY VISIT (OUTPATIENT)
Dept: ONCOLOGY | Facility: CLINIC | Age: 53
End: 2018-11-19
Attending: INTERNAL MEDICINE
Payer: COMMERCIAL

## 2018-11-19 VITALS
SYSTOLIC BLOOD PRESSURE: 124 MMHG | RESPIRATION RATE: 14 BRPM | HEART RATE: 88 BPM | HEIGHT: 64 IN | OXYGEN SATURATION: 99 % | DIASTOLIC BLOOD PRESSURE: 84 MMHG | BODY MASS INDEX: 31.05 KG/M2 | WEIGHT: 181.9 LBS | TEMPERATURE: 97.7 F

## 2018-11-19 DIAGNOSIS — C50.411 MALIGNANT NEOPLASM OF UPPER-OUTER QUADRANT OF RIGHT BREAST IN FEMALE, ESTROGEN RECEPTOR POSITIVE (H): Primary | ICD-10-CM

## 2018-11-19 DIAGNOSIS — Z17.0 MALIGNANT NEOPLASM OF UPPER-OUTER QUADRANT OF RIGHT BREAST IN FEMALE, ESTROGEN RECEPTOR POSITIVE (H): Primary | ICD-10-CM

## 2018-11-19 DIAGNOSIS — L23.89 ALLERGIC CONTACT DERMATITIS DUE TO OTHER AGENTS: Primary | ICD-10-CM

## 2018-11-19 PROCEDURE — G0463 HOSPITAL OUTPT CLINIC VISIT: HCPCS | Mod: ZF

## 2018-11-19 PROCEDURE — 99205 OFFICE O/P NEW HI 60 MIN: CPT | Mod: ZP | Performed by: INTERNAL MEDICINE

## 2018-11-19 ASSESSMENT — PAIN SCALES - GENERAL
PAINLEVEL: MILD PAIN (2)
PAINLEVEL: NO PAIN (0)

## 2018-11-19 NOTE — PROGRESS NOTES
Oncology Consultation  Date on this visit: 11/19/2018    Gabriella Villarreal  is referred by Dr.Jane Lyric Oviedo for an oncology consultation. She requires evaluation for new diagnosis of invasive mammary carcinoma of the right breast, s/p lumpectomy.     History Of Present Illness:  Ms. Villarreal is a 53 year old female who presents with new diagnosis of invasive mammary carcinoma involving the right breast. She underwent right sided wire-localized segmental mastectomy and sentinel lymph node biopsy on 10/24/18 with Dr. Kalina Oviedo. She was seen in follow-up in the post-operative period and has been noted to be healing well. Her course was complicated by a rash on the right breast and axilla that was pruritic and erythematous. There was some weeping as well. It eventually spread up into the right neck and down into the upper abdomen as well. She was seen by dermatology on 10/31/18 and was diagnosed with acute contact dermatitis. She was treated with steroids and the rash has significantly improved. She has a follow-up appointment with dermatology later today, for consideration of patch testing.    She reports feeling well today. She has gotten the results of her genetic testing and OncoTypeDx. There were no concerning inherited genetics. Her OncoType score is 13. She has done some of her own research, and understands that she will need radiation as well as likely endocrine therapy. She is concerned about the risks of endocrine therapy and asks questions regarding the additional benefit in terms of recurrence risk. Otherwise doing very well. No fevers or chills. Some minor swelling at the lateral inferior aspect of the right breast, incision is well-healed. Dry skin at the site of the incision and across the nipple.     Past Medical/Surgical History:  Past Medical History:   Diagnosis Date     Allergic rhinitis      Chronic cough      GERD (gastroesophageal reflux disease)      Hypothyroidism      Past Surgical History:  "  Procedure Laterality Date     CHOLECYSTECTOMY       L salpingooophorectomy  2015     LUMPECTOMY BREAST WITH SENTINEL NODE, COMBINED Right 10/24/2018    Procedure: Right Wire Localized Segmental Mastectomy (Lumpectomy), Right [place] Lymph Node Biopsy;  Surgeon: Kalina Oviedo MD;  Location: [facility] OR     Menstrual History:   Menarche around age 15.  Three pregnancies, uncomplicated.   the first two children.  Was not on OCPs until her mid-40s, at which time she started \"low-dose\" combined OCPs for painful periods.  Still having relatively regular monthly cycles, some just spotting, others \"normal periods\".    Allergies:  Allergies as of 11/19/2018 - [place] as Reviewed 11/19/2018   Allergen Reaction Noted     Seasonal allergies  10/24/2018     Current Medications:  Current Outpatient Prescriptions   Medication Sig Dispense Refill     ACETAMINOPHEN PO Take 1,000 mg by mouth       albuterol (PROAIR HFA/PROVENTIL HFA/VENTOLIN HFA) 108 (90 Base) MCG/ACT inhaler Inhale 2-4 puffs into the lungs       chlorpheniramine (CHLOR-TRIMETON) 4 MG tablet Take 4 mg by mouth        diphenhydrAMINE (BENADRYL) 25 MG tablet        emollient (VANICREAM) cream Apply topically as needed for other (for dry skin) 57 g 0     ibuprofen (ADVIL/MOTRIN) 200 MG tablet Take 200 mg by mouth       levothyroxine (SYNTHROID/LEVOTHROID) 100 MCG tablet Take 100 mcg by mouth every morning        triamcinolone (KENALOG) 0.1 % cream Apply topically 2 times daily 80 g 1     cephALEXin (KEFLEX) 500 MG capsule Take 1 capsule (500 mg) by mouth 4 times daily (Patient not taking: Reported on 11/19/2018) 28 capsule 0     fluticasone (FLONASE) 50 MCG/ACT spray INSTILL 2 SPRAYS INTO BOTH NOSTRILS DAILY.       gentian violet 1 % solution Apply 0.5 mLs topically 3 times daily (Patient not taking: Reported on 11/6/2018) 59 mL 1     gentian violet 2 % solution Apply 1 mL topically 2 times daily For affected area 1 - 2 times a day. Apply to area before " "applying triamcinolone cream (Patient not taking: Reported on 11/6/2018) 59 mL 3     oxyCODONE IR (ROXICODONE) 5 MG tablet Take 1-2 tablets (5-10 mg) by mouth every 4 hours as needed for moderate to severe pain (Patient not taking: Reported on 11/6/2018) 6 tablet 0     ranitidine (ZANTAC) 150 MG tablet Take 150 mg by mouth 2 times daily  60 tablet 1     senna-docusate (SENOKOT-S;PERICOLACE) 8.6-50 MG per tablet Take 1-2 tablets by mouth 2 times daily (Patient not taking: Reported on 11/6/2018) 30 tablet 0      Family History:  Family History   Problem Relation Age of Onset     Myocardial Infarction Mother      Coronary Artery Disease Mother      Arrhythmia Father      Prostate Cancer Father      Alcoholism Father      Alcoholism Sister      Diabetes Maternal Grandmother      No Known Problems Maternal Grandfather      No Known Problems Paternal Grandmother      HEART DISEASE Paternal Grandfather      Skin Cancer Other      Ovarian Cancer Maternal Aunt      Ovarian Cancer Other      Melanoma No family hx of      Social History:  Social History     Social History     Marital status:      Spouse name: N/A     Number of children: 3     Years of education: N/A     Occupational History     self-employed      Social History Main Topics     Smoking status: Never Smoker     Smokeless tobacco: Never Used     Alcohol use Yes      Comment: occasional     Drug use: No     Sexual activity: Not on file     Other Topics Concern     Not on file     Social History Narrative     Physical Exam:  /84 (BP Location: Right arm, Patient Position: Chair, Cuff Size: Adult Regular)  Pulse 88  Temp 97.7  F (36.5  C) (Oral)  Resp 14  Ht 1.626 m (5' 4.02\")  Wt 82.5 kg (181 lb 14.4 oz)  LMP 10/26/2018 (Exact Date)  SpO2 99%  BMI 31.21 kg/m2    GENERAL APPEARANCE: healthy, alert and no distress     HENT: Mouth without ulcers or lesions     NECK: no adenopathy, no asymmetry or masses     LYMPHATICS: No cervical, supraclavicular, " axillary or inguinal lymphadenopathy     RESP: lungs clear to auscultation - no rales, rhonchi or wheezes     BREAST: s/p lumpectomy with well-healed surgical incision without erythema, dry skin overlying nipple and surgical incision     CARDIOVASCULAR: regular rates and rhythm, normal S1 S2, no S3 or S4 and no murmur.     ABDOMEN:  soft, nontender, no HSM or masses and bowel sounds normal     MUSCULOSKELETAL: extremities normal- no gross deformities noted, no evidence of inflammation in joints, FROM in all extremities. No edema b/l LE.     SKIN: no suspicious lesions or rashes     PSYCHIATRIC: mentation appears normal and affect normal    Laboratory/Imaging Studies  Lab Results   Component Value Date    WBC 6.8 10/19/2018    HGB 13.3 10/19/2018    HCT 39.4 10/19/2018     10/19/2018     10/19/2018    BUN 10 10/19/2018    CO2 26 10/19/2018     SURGICAL PATHOLOGY, 10/24/18:  SPECIMEN(S):    A: Right breast mass    B: Right axillary sentinal lymph node #1    C: Right breast, new inferior margin   FINAL DIAGNOSIS:   A. Right breast, lower outer quadrant, wire localized segmental mastectomy:   - INVASIVE MAMMARY CARCINOMA OF NO SPECIAL TYPE (INVASIVE DUCTAL CARCINOMA), JAJA GRADE 1, two foci   - Focus of invasive carcinoma with prior core biopsy site: size 18 mm   - Focus of invasive carcinoma slightly anterior and superior to first focus: size 15 mm   - Ductal carcinoma in situ (DCIS), nuclear grade 2, solid, cribriform and papillary types, with lobular involvement and comedonecrosis   - DCIS involves an intraductal papilloma   - DCIS is admixed with and adjacent to both foci of invasive carcinoma, and spans an estimated 3.5 cm (extensive intraductal component)   - No lymphovascular invasion identified   - The inferior margin of this specimen is involved by invasive carcinoma and DCIS (not a final margin, see specimen C)   - Invasive carcinoma is 1 mm from the superior margin, 4 mm from the posterior  margin, 5 mm from the medial margin and > 5 mm from the anterior and lateral margins   - DCIS is 2 mm from the superior margin, 3.5 mm from the medial margin, 5 mm from the posterior margin and > 5 mm from the anterior and lateral margins   - Flat epithelial atypia   - Other findings: columnar cell change, intraductal papillomas, fibrocystic change (including microcysts with apocrine metaplasia) and usual ductal hyperplasia   - Calcifications associated with DCIS   - Prior core biopsy site changes   - Larger focus of invasive carcinoma is estrogen receptor positive (95%, strong intensity), progesterone receptor positive (98%, strong intensity) and reportedly HER2 equivocal (2+ per outside report) by immunohistochemistry and is reportedly HER2 non-amplified by FISH (HER2:CEN17 ratio 1.06 per outside report)   - HER2 FISH for the smaller focus of invasive carcinoma will be reported separately by cytogenetics   - See comment for tumor synoptic   - See microscopic description     B. Lymph node, right axillary, sentinel # 1, excision:   - One benign lymph node (0/1)     C. Right breast, new inferior margin, excision:   - Atypical ductal hyperplasia   - Fibrocystic change (including microcysts with apocrine metaplasia   - No residual invasive or in situ carcinoma     COMMENT:   Invasive carcinoma and DCIS are 10 mm from the final inferior margin, because the new inferior margin (specimen C) is 10 mm thick and is uninvolved by invasive carcinoma and DCIS.     ASSESSMENT/PLAN:  Gabriella Villarreal is a 53 year old woman with recently diagnosed Stage IA invasive mammary carcinoma involving the right breast, ER+/AK+ and Her2 non-amplified (pT1c(2)N0). She underwent wire-localized right-sided segmental mastectomy with Dr. Kalina Oviedo on 10/24/18. Her course was complicated by an intensely pruritic rash, which started in the right axilla and spread to the right breast, up to the neck and into the upper right abdomen. This was  diagnosed as acute contact dermatitis and resolved with a course of steroids.     OncoTypeDX score is 13, indicating low-risk of distant recurrence and no additional benefit of chemotherapy. With her hormone-receptor positive disease, and perimenopausal status - we recommend adjuvant endocrine therapy with Tamoxifen. We discussed this in length today, with a plan to potentially start after she completes radiation. She is nervous about the risks of anti-estrogen therapy, though understands the reasons for it in terms of decreasing her risk of recurrence. She will do her own research and consider this over the next several weeks. We also discussed the benefits of exercise (150 minutes of aerobic activity per week), and she plans to start an exercise regimen after she's fully recovered from her surgery.    Will have her follow-up in oncology clinic in mid-January, after completion (or toward the end of) radiation. She will likely establish care with radiation oncology closer to home in [place]. Will need a DEXA scan prior to Tamoxifen.      Patient was seen and plan was discussed with Dr. Berrios.    Sonia Koroma MD/PhD  Heme/Onc Fellow      HISTORY OF PRESENT ILLNESS:  The patient was seen and evaluated by me with Dr. Koroma  She is a 53-year-old perimenopausal woman with a T1cN0 breast cancer involving her right breast, ER positive, AR positive, HER2 negative.  She underwent a wire localized right lumpectomy with sentinel lymph node biopsy 10/24/2018.  She has recovered from that.  She had Oncotype DX score of 13, giving a low risk of distant recurrence.      I spent the remainder of my time discussing with Gabriella that she has a low-risk Oncotype and I would not recommend chemotherapy.  She has complete surgical resection.  I would recommend that she have adjuvant radiation therapy and a referral was placed for the [place] clinic closer to her home.      We spent the remainder of our time discussing the  risk of recurrence.  I would recommend adjuvant endocrine therapy with tamoxifen.  She is currently perimenopausal.  We discussed that likely she would be on tamoxifen for 2-3 years and then switched over to an aromatase inhibitor.  She was given a handout with both of these medications as well as discussed the side effects of both of these treatment regimens.      I plan to see her back after her radiation therapy and begin endocrine therapy at that time.      Ultimately, she will need a baseline bone density scan.      She is recovering well and was given information regarding supportive care services.

## 2018-11-19 NOTE — PROGRESS NOTES
Date/time of application:11/19/18  Physician/Nurse:  / Candace Gupta LPN               Localization of application: Back     STANDARD Series         No Substance 2 days 4 days remarks   1 Clement Mix [C] - -    2 Colophony - -    3  2-Mercaptobenzothiazole  - -     4 Methylisothiazolinone - -    5 Carba Mix - -    6 Thiuram Mix [A] - -    7 Bisphenol A Epoxy Resin - -    8 E-Obbl-Jetwkzdnbah-Formaldehyde Resin - -    9 Mercapto Mix [A] - -    10 Black Rubber Mix- PPD [B] - -    11 Potassium Dichromate  -  -    12 Balsam of [place] (Myroxylon Pereirae Resin) - -    13 Nickel Sulphate Hexahydrate - -    14 Mixed Dialkyl Thiourea - -    15 Paraben Mix [B] - -    16 Methyldibromo Glutaronitrile - -    17 Fragrance Mix - -    18 2-Bromo-2-Nitropropane-1,3-Diol (Bronopol) - -    19 Lyral - -    20 Tixocortol-21- Pivalate - -    21 Diazolidiyl Urea (Germall II) - -    22 Methyl Methacrylate - -    23 Cobalt (II) Chloride Hexahydrate - -    24 Fragrance Mix II  - -    25 Compositae Mix - -    26 Benzoyl Peroxide - -    27 Bacitracin - -    28 Formaldehyde - -    29 Methylchloroisothiazolinone / Methylisothiazolinone - -    30 Corticosteroid Mix - -    31 Sodium Lauryl Sulfate - -    32 Lanolin Alcohol - -    33 Turpentine - -    34 Cetylstearylalcohol - -    35 Chlorhexidine Dicluconate - -    36 Budenoside - -    37 Imidazolidinyl Urea  - -    38 Ethyl-2 Cyanoacrylate - -    39 Quaternium 15 (Dowicil 200) - -    40 Decyl Glucoside - -      PLASTICS         No Substance 2 days 4 days remarks    Acrylates - -    41 2-Hydroxyethyl Methacrylate (HEMA) - -    42 1,4-Butandioldimethacrylate (BUDMA) - -    43  2-Ethylhexyl Acrylate - -    44 Bisphenol-A-Dimethacrylate  - -    45 Diurethane-Dimethacrylate - -    46 Ethyleneglycoldimethacrylate (EGDMA) - -    47 Pentaerythritoltriacrylate (MIRANDA) - -    48 Triethylene Glycol Dimethacrylate (TEGDMA) - -     Synthetic material/additives       49 V-Kqde-Bviensfhhsb - -    50  Tricresyl Phosphate - -    51 3-Qkgj-Itphskdphmnky - -    52 Bis (2-Ethylhexyl) Phthalate - -    53 Dibutylphthalate - -    54 Dimethylphthalate - -    55 Toluene-2,4-Diisocyanate - -    56 Diphenylmethane-4,4''-Diisocyanate - -     EPOXY RESIN SYSTEMS       Reactive Solvents - -    57 Cresyl Glycidyl Ether - -    58 Butyl Glycidyl Ether - -    59 Phenyl Glycidyl Ether - -    60 1,4-Butanediol Diglycidyl Ether - -    61 1,6-Hexanediole Diglycidyl Ether - -     Hardener / Accelerator - -    62 Ethylenediamine Dihydrochloride - -    63 Triethylenetetramine - -    64 Diethylenetriamine - -    65 Isophorone Diamine (IPD) - -    66 N,N-Dimethyl-P-Toluidine - -      ANTIBIOTICS & ANTIMYCOTICS         No Substance 2 days 4 days remarks   67 Erythromycine - -    68 Framycetine Sulphate - -    69 Fusidic Acid Sodium Salt - -    70 Gentamicin Sulphate - -    71 Neomycine Sulphate - -    72 Oxytetracycline  - -    73 Polymyxin B Sulphate - -    74 Tetracycline-HCL - -    75 Sulfanilamide - -    76 Metronidazole - -    77 Oxyquinoline Mix - -    78 Nitrofurazone - -    79 Nystatin - -    80 Clotrimazole - -      PRESERVATIVES & ANTIMICROBIALS         No Substance 2 days 4 days remarks   81  1,2-Benzisothiazoline-3-One, Sodium Salt - -    82  1,3,5-Osmin (2-Hydroxyethyl) - Hexahydrotriazine (Grotan BK) - -    83 3-Kucgoytucbsem-5-Nitro-1, 3-Propanediol - -    84  3, 4, 4' - Triclocarban - -    85 4 - Chloro - 3 - Cresol - -    86 4 - Chloro - 4 - Xylenol (PCMX) - -    87 7-Ethylbicyclooxazolidine (Bioban QS1896) - -    88 Benzalkonium Chloride - -    89 Benzyl Alcohol - -    90 Cetalkonium Chloride - -    91 Cetylpyrimidine Chloride  - -    92 Chloroacetamide - -    93 DMDM Hydantoin - -    94 Glutaraldehyde - -    95 Triclosan - -    96 Glyoxal Trimeric Dihydrate - -    97 Iodopropynyl Butylcarbamate - -    98 Octylisothiazoline - -    99 Iodoform - -    100 (Nitrobutyl) Morpholine/(Ethylnitro-Trimethylene) Dimorpholine (Bioban  P 1487) - -    101 Phenoxyethanol - -    102 Phenyl Salicylate - -    103 Povidone Iodine - -    104 Sodium Benzoate - -    105 Sodium Disulfite - -    106 Sorbic Acid - -    107 Thimerosal - -     Parabens      108 Butyl-P-Hydroxybenzoate - -    109 Ethyl-P-Hydroxybenzoate - -    100 Methyl-P-Hydroxybenzoate - -    101 Propyl-P-Hydroxybenzoate - -      Results of patch tests:                         Interpretation:    - Negative                    A    = Allergic      (+) Erythema    TI   = Toxic/irritant   + E + Infiltration    RaP = Relevance at Present     ++ E/I + Papulovesicle   Rpr  = Relevance Previously     +++ E/I/P + Blister     nR   = No Relevance

## 2018-11-19 NOTE — LETTER
11/19/2018       RE: Gabriella Villarreal  [address] [facility] [place] [address]     Dear Colleague,    Thank you for referring your patient, Gabriella Villarreal, to the [facility] DERMATOLOGY at [facility]. Please see a copy of my visit note below.    [facility] Dermatology Note      Dermatology Problem List:  1. Allergic contact dermatitis    Encounter Date: Nov 19, 2018    CC:  Chief Complaint   Patient presents with     Allergies     Lauren is here for patch testing day 1.         History of Present Illness:  Ms. Gabriella Villarreal is a 53 year old female who presents as a follow-up for rash on the right breast. The patient was last seen 10/31. She underwent lumpectomy with sentinel node removal on 10/24, and developed a vesicular maculopapular rash between the two incision sites on 10/26. The rash spread to her neck, torso, and right arm, it was warm to the touch, and pruritic. She was seen by surgical oncology and her wounds were not concerning for infection. She does have a history of atopy. At her last visit on 10/31 (see photo in Media), she was started on Gentian violet BID, triamcinolone 0.1% cream BID, and keflex per surgical oncology. She has been unable to get the gentian, but has been using the triamcinolone on her entire rash and taking keflex as prescribed. She also has been switching off between benadryl and zyrtec to control the pruritis.    Today she returns to clinic with much improvement in the erythema and swelling. She is concerned that the rash is continuing to spread very slowly down her right arm, around toward her back, across her chest, and down her abdomen. The rash is also persistently pruritic. However, she believes it has mostly been improving. She has not experienced fevers, chills, sweats, or increased pain at her incision sites. There has been no more drainage from the incision sites, nor has her rash been weeping or  bleeding.    Of note, she had a small operation during which they used surgical glue in 2015. She did not react at that time.    Past Medical History:   Patient Active Problem List   Diagnosis     Malignant neoplasm of upper-outer quadrant of right breast in female, estrogen receptor positive (H)     Past Medical History:   Diagnosis Date     Allergic rhinitis      Chronic cough      GERD (gastroesophageal reflux disease)      Hypothyroidism      Past Surgical History:   Procedure Laterality Date     CHOLECYSTECTOMY       L salpingooophorectomy  2015     LUMPECTOMY BREAST WITH SENTINEL NODE, COMBINED Right 10/24/2018    Procedure: Right Wire Localized Segmental Mastectomy (Lumpectomy), Right [place] Lymph Node Biopsy;  Surgeon: Kalina Oviedo MD;  Location:  OR       Social History:  Patient  reports that she has never smoked. She has never used smokeless tobacco. She reports that she drinks alcohol. She reports that she does not use illicit drugs.    Family History:  Family History   Problem Relation Age of Onset     Myocardial Infarction Mother      Coronary Artery Disease Mother      Arrhythmia Father      Prostate Cancer Father      Alcoholism Father      Alcoholism Sister      Diabetes Maternal Grandmother      No Known Problems Maternal Grandfather      No Known Problems Paternal Grandmother      HEART DISEASE Paternal Grandfather      Skin Cancer Other      Ovarian Cancer Maternal Aunt      Ovarian Cancer Other      Melanoma No family hx of        Medications:  Current Outpatient Prescriptions   Medication Sig Dispense Refill     ACETAMINOPHEN PO Take 1,000 mg by mouth       albuterol (PROAIR HFA/PROVENTIL HFA/VENTOLIN HFA) 108 (90 Base) MCG/ACT inhaler Inhale 2-4 puffs into the lungs       chlorpheniramine (CHLOR-TRIMETON) 4 MG tablet Take 4 mg by mouth        diphenhydrAMINE (BENADRYL) 25 MG tablet        emollient (VANICREAM) cream Apply topically as needed for other (for dry skin) 57 g 0      fluticasone (FLONASE) 50 MCG/ACT spray INSTILL 2 SPRAYS INTO BOTH NOSTRILS DAILY.       ibuprofen (ADVIL/MOTRIN) 200 MG tablet Take 200 mg by mouth       levothyroxine (SYNTHROID/LEVOTHROID) 100 MCG tablet Take 100 mcg by mouth every morning        ranitidine (ZANTAC) 150 MG tablet Take 150 mg by mouth 2 times daily  60 tablet 1     triamcinolone (KENALOG) 0.1 % cream Apply topically 2 times daily 80 g 1     cephALEXin (KEFLEX) 500 MG capsule Take 1 capsule (500 mg) by mouth 4 times daily (Patient not taking: Reported on 11/19/2018) 28 capsule 0     gentian violet 1 % solution Apply 0.5 mLs topically 3 times daily (Patient not taking: Reported on 11/6/2018) 59 mL 1     gentian violet 2 % solution Apply 1 mL topically 2 times daily For affected area 1 - 2 times a day. Apply to area before applying triamcinolone cream (Patient not taking: Reported on 11/6/2018) 59 mL 3     oxyCODONE IR (ROXICODONE) 5 MG tablet Take 1-2 tablets (5-10 mg) by mouth every 4 hours as needed for moderate to severe pain (Patient not taking: Reported on 11/6/2018) 6 tablet 0     senna-docusate (SENOKOT-S;PERICOLACE) 8.6-50 MG per tablet Take 1-2 tablets by mouth 2 times daily (Patient not taking: Reported on 11/6/2018) 30 tablet 0     Allergies   Allergen Reactions     Seasonal Allergies          Review of Systems:  -As per HPI  -Constitutional: The patient denies fatigue, fevers, chills, unintended weight loss, and night sweats.  -HEENT: Patient denies nonhealing oral sores.  -Skin: As above in HPI. No additional skin concerns.    Physical exam:  Vitals: LMP 10/26/2018 (Exact Date)  GEN: This is a well developed, well-nourished female in no acute distress, in a pleasant mood.    SKIN: Waist-up skin, which includes the head/face, neck, both arms, chest, back, abdomen, digits and/or nails was examined.  - Maculopapular rash involving right breast, spreading to neck, right arm immediately distal to antecubital fossa, down to naval, and around  to the right side.  - in the moment no active eczemtous lesions anymore, no oozing and no blisters. Some re-activation upper arm and axillar area and starting elbow flexural area    Impression/Plan:  1. Allergic Contact dermatitis    Start Gentian violet BID when able to get it    Continue triamcinolone 0.1% BID for 3-4 more days    Plan for allergy patch test when symptoms resolve, orders as below    Continue Keflex as prescribed by surgical oncology     Use vanicream on skin after rash resolves    Okay to take both benadryl and zyrtec     Order for PATCH TESTS     [x] Outpatient                                              [] Inpatient: Ibrahim..../ Bed ....                                        Skin Atopy (atopic dermatitis)                  [] Yes   [] No  Rhinitis/Sinusitis:                                                            [x] Yes   [] No --> seasonal allergies (chronic coughing any time in the day ==> DDx Reflux)  Allergic Asthma:                                                              [] Yes   [x] No  Food Allergy:                                                                   [] Yes   [x] No  Leg ulcers:                                                                       [] Yes   [x] No  Hand eczema:                                                                  [] Yes   [x] No                                           Leading hand:   [] R   [] L       [] Ambidextrous                         Reason for tests (suspected allergy): adhesives (acrylates) or disinfectants (Chlorhexidine wipes)  Known previous allergies: none     Standardized panels  [x] Standard panel (40 tests)  [x] Preservatives & Antimicrobials (31 tests)  [] Emulsifiers & Additives (25 tests)   [] Perfumes/Flavours & Plants (25 tests)  [] Hairdresser panel (12 tests)  [] Rubber Chemicals (22 tests)  [x] Plastics (26 tests)  [] Colorants/Dyes/Food additives (20 tests)  [] Metals (implants/dental) (23 tests)  [] Local  anaesthetics/NSAIDs (12 tests)  [x] Antibiotics & Antimycotics (14 tests)   [] Corticosteroids (15 tests)   [] Photopatch test (32 tests)   [] others: ...       RESULTS & EVALUATION of PATCH TESTS    Patch test readings after     [x] 2 days, [] 3 days [x] 4 days, [] 5 days,    Applied patch tests with results (import here the list of patch tests):  Date/time of application:11/19/18  Physician/Nurse:  / Candace Gupta LPN               Localization of application: Back, in the moment without eczematous lesions    STANDARD Series         No Substance 2 days 4 days remarks   1 Clmeent Mix [C] - -    2 Colophony - -    3  2-Mercaptobenzothiazole  - -     4 Methylisothiazolinone - -    5 Carba Mix - -    6 Thiuram Mix [A] - -    7 Bisphenol A Epoxy Resin - -    8 W-Rbdo-Otxhsricikf-Formaldehyde Resin - -    9 Mercapto Mix [A] - -    10 Black Rubber Mix- PPD [B] - -    11 Potassium Dichromate  -  -    12 Balsam of [place] (Myroxylon Pereirae Resin) - -    13 Nickel Sulphate Hexahydrate - -    14 Mixed Dialkyl Thiourea - -    15 Paraben Mix [B] - -    16 Methyldibromo Glutaronitrile - -    17 Fragrance Mix - -    18 2-Bromo-2-Nitropropane-1,3-Diol (Bronopol) - -    19 Lyral - -    20 Tixocortol-21- Pivalate - -    21 Diazolidiyl Urea (Germall II) - -    22 Methyl Methacrylate - -    23 Cobalt (II) Chloride Hexahydrate - -    24 Fragrance Mix II  - -    25 Compositae Mix - -    26 Benzoyl Peroxide - -    27 Bacitracin - -    28 Formaldehyde - -    29 Methylchloroisothiazolinone / Methylisothiazolinone - -    30 Corticosteroid Mix - -    31 Sodium Lauryl Sulfate - -    32 Lanolin Alcohol - -    33 Turpentine - -    34 Cetylstearylalcohol - -    35 Chlorhexidine Dicluconate - -    36 Budenoside - -    37 Imidazolidinyl Urea  - -    38 Ethyl-2 Cyanoacrylate - -    39 Quaternium 15 (Dowicil 200) - -    40 Decyl Glucoside - -      PLASTICS         No Substance 2 days 4 days remarks    Acrylates - -    41 2-Hydroxyethyl  Methacrylate (HEMA) - -    42 1,4-Butandioldimethacrylate (BUDMA) - -    43  2-Ethylhexyl Acrylate - -    44 Bisphenol-A-Dimethacrylate  - -    45 Diurethane-Dimethacrylate - -    46 Ethyleneglycoldimethacrylate (EGDMA) - -    47 Pentaerythritoltriacrylate (MIRANDA) - -    48 Triethylene Glycol Dimethacrylate (TEGDMA) - -     Synthetic material/additives       49 B-Bjbt-Jbvtgbspvoy - -    50 Tricresyl Phosphate - -    51 7-Mdwd-Jwlhfpbxcueqc - -    52 Bis (2-Ethylhexyl) Phthalate - -    53 Dibutylphthalate - -    54 Dimethylphthalate - -    55 Toluene-2,4-Diisocyanate - -    56 Diphenylmethane-4,4''-Diisocyanate - -     EPOXY RESIN SYSTEMS       Reactive Solvents - -    57 Cresyl Glycidyl Ether - -    58 Butyl Glycidyl Ether - -    59 Phenyl Glycidyl Ether - -    60 1,4-Butanediol Diglycidyl Ether - -    61 1,6-Hexanediole Diglycidyl Ether - -     Hardener / Accelerator - -    62 Ethylenediamine Dihydrochloride - -    63 Triethylenetetramine - -    64 Diethylenetriamine - -    65 Isophorone Diamine (IPD) - -    66 N,N-Dimethyl-P-Toluidine - -      ANTIBIOTICS & ANTIMYCOTICS         No Substance 2 days 4 days remarks   67 Erythromycine - -    68 Framycetine Sulphate - -    69 Fusidic Acid Sodium Salt - -    70 Gentamicin Sulphate - -    71 Neomycine Sulphate - -    72 Oxytetracycline  - -    73 Polymyxin B Sulphate - -    74 Tetracycline-HCL - -    75 Sulfanilamide - -    76 Metronidazole - -    77 Oxyquinoline Mix - -    78 Nitrofurazone - -    79 Nystatin - -    80 Clotrimazole - -      PRESERVATIVES & ANTIMICROBIALS         No Substance 2 days 4 days remarks   81  1,2-Benzisothiazoline-3-One, Sodium Salt - -    82  1,3,5-Osmin (2-Hydroxyethyl) - Hexahydrotriazine (Grotan BK) - -    83 3-Rvnnijqnmasxe-5-Nitro-1, 3-Propanediol - -    84  3, 4, 4' - Triclocarban - -    85 4 - Chloro - 3 - Cresol - -    86 4 - Chloro - 4 - Xylenol (PCMX) - -    87 7-Ethylbicyclooxazolidine ([facility] [id number]) - -    88 Benzalkonium Chloride -  -    89 Benzyl Alcohol - -    90 Cetalkonium Chloride - -    91 Cetylpyrimidine Chloride  - -    92 Chloroacetamide - -    93 DMDM Hydantoin - -    94 Glutaraldehyde - -    95 Triclosan - -    96 Glyoxal Trimeric Dihydrate - -    97 Iodopropynyl Butylcarbamate - -    98 Octylisothiazoline - -    99 Iodoform - -    100 (Nitrobutyl) Morpholine/(Ethylnitro-Trimethylene) Dimorpholine (Bioban P 1487) - -    101 Phenoxyethanol - -    102 Phenyl Salicylate - -    103 Povidone Iodine - -    104 Sodium Benzoate - -    105 Sodium Disulfite - -    106 Sorbic Acid - -    107 Thimerosal - -     Parabens      108 Butyl-P-Hydroxybenzoate - -    109 Ethyl-P-Hydroxybenzoate - -    110 Methyl-P-Hydroxybenzoate - -    111 Propyl-P-Hydroxybenzoate - -      Results of patch tests:                         Interpretation:    - Negative                    A    = Allergic      (+) Erythema    TI   = Toxic/irritant   + E + Infiltration    RaP = Relevance at Present     ++ E/I + Papulovesicle   Rpr  = Relevance Previously     +++ E/I/P + Blister     nR   = No Relevance    [] No relevant allergic reaction observed    [] Allergic reaction diagnosed against: see later      Interpretation/ remarks:   See later    [] Patient information given   [] [facility]SD information   [] [employer] information    ==> final Diagnosis:  See later    ==> Treatment prescribed/Plan:  See later      These conclusionsare made at the best of ones knowledge and belief  based on the provided evidence such as patients history and allergy test results and they can change over time or can be incomplete because of missing informations.      Again, thank you for allowing me to participate in the care of your patient.      Sincerely,    Mj Simon MD

## 2018-11-19 NOTE — PROGRESS NOTES
[facility] Dermatology Note      Dermatology Problem List:  1. Allergic contact dermatitis    Encounter Date: Nov 19, 2018    CC:  Chief Complaint   Patient presents with     Allergies     Lauren is here for patch testing day 1.         History of Present Illness:  Ms. Gabriella Villarreal is a 53 year old female who presents as a follow-up for rash on the right breast. The patient was last seen 10/31. She underwent lumpectomy with sentinel node removal on 10/24, and developed a vesicular maculopapular rash between the two incision sites on 10/26. The rash spread to her neck, torso, and right arm, it was warm to the touch, and pruritic. She was seen by surgical oncology and her wounds were not concerning for infection. She does have a history of atopy. At her last visit on 10/31 (see photo in Media), she was started on Gentian violet BID, triamcinolone 0.1% cream BID, and keflex per surgical oncology. She has been unable to get the gentian, but has been using the triamcinolone on her entire rash and taking keflex as prescribed. She also has been switching off between benadryl and zyrtec to control the pruritis.    Today she returns to clinic with much improvement in the erythema and swelling. She is concerned that the rash is continuing to spread very slowly down her right arm, around toward her back, across her chest, and down her abdomen. The rash is also persistently pruritic. However, she believes it has mostly been improving. She has not experienced fevers, chills, sweats, or increased pain at her incision sites. There has been no more drainage from the incision sites, nor has her rash been weeping or bleeding.    Of note, she had a small operation during which they used surgical glue in 2015. She did not react at that time.    Past Medical History:   Patient Active Problem List   Diagnosis     Malignant neoplasm of upper-outer quadrant of right breast in female, estrogen receptor positive (H)      Past Medical History:   Diagnosis Date     Allergic rhinitis      Chronic cough      GERD (gastroesophageal reflux disease)      Hypothyroidism      Past Surgical History:   Procedure Laterality Date     CHOLECYSTECTOMY       L salpingooophorectomy  2015     LUMPECTOMY BREAST WITH SENTINEL NODE, COMBINED Right 10/24/2018    Procedure: Right Wire Localized Segmental Mastectomy (Lumpectomy), Right [place] Lymph Node Biopsy;  Surgeon: Kalina Oviedo MD;  Location:  OR       Social History:  Patient  reports that she has never smoked. She has never used smokeless tobacco. She reports that she drinks alcohol. She reports that she does not use illicit drugs.    Family History:  Family History   Problem Relation Age of Onset     Myocardial Infarction Mother      Coronary Artery Disease Mother      Arrhythmia Father      Prostate Cancer Father      Alcoholism Father      Alcoholism Sister      Diabetes Maternal Grandmother      No Known Problems Maternal Grandfather      No Known Problems Paternal Grandmother      HEART DISEASE Paternal Grandfather      Skin Cancer Other      Ovarian Cancer Maternal Aunt      Ovarian Cancer Other      Melanoma No family hx of        Medications:  Current Outpatient Prescriptions   Medication Sig Dispense Refill     ACETAMINOPHEN PO Take 1,000 mg by mouth       albuterol (PROAIR HFA/PROVENTIL HFA/VENTOLIN HFA) 108 (90 Base) MCG/ACT inhaler Inhale 2-4 puffs into the lungs       chlorpheniramine (CHLOR-TRIMETON) 4 MG tablet Take 4 mg by mouth        diphenhydrAMINE (BENADRYL) 25 MG tablet        emollient (VANICREAM) cream Apply topically as needed for other (for dry skin) 57 g 0     fluticasone (FLONASE) 50 MCG/ACT spray INSTILL 2 SPRAYS INTO BOTH NOSTRILS DAILY.       ibuprofen (ADVIL/MOTRIN) 200 MG tablet Take 200 mg by mouth       levothyroxine (SYNTHROID/LEVOTHROID) 100 MCG tablet Take 100 mcg by mouth every morning        ranitidine (ZANTAC) 150 MG tablet Take 150 mg by  mouth 2 times daily  60 tablet 1     triamcinolone (KENALOG) 0.1 % cream Apply topically 2 times daily 80 g 1     cephALEXin (KEFLEX) 500 MG capsule Take 1 capsule (500 mg) by mouth 4 times daily (Patient not taking: Reported on 11/19/2018) 28 capsule 0     gentian violet 1 % solution Apply 0.5 mLs topically 3 times daily (Patient not taking: Reported on 11/6/2018) 59 mL 1     gentian violet 2 % solution Apply 1 mL topically 2 times daily For affected area 1 - 2 times a day. Apply to area before applying triamcinolone cream (Patient not taking: Reported on 11/6/2018) 59 mL 3     oxyCODONE IR (ROXICODONE) 5 MG tablet Take 1-2 tablets (5-10 mg) by mouth every 4 hours as needed for moderate to severe pain (Patient not taking: Reported on 11/6/2018) 6 tablet 0     senna-docusate (SENOKOT-S;PERICOLACE) 8.6-50 MG per tablet Take 1-2 tablets by mouth 2 times daily (Patient not taking: Reported on 11/6/2018) 30 tablet 0     Allergies   Allergen Reactions     Seasonal Allergies          Review of Systems:  -As per HPI  -Constitutional: The patient denies fatigue, fevers, chills, unintended weight loss, and night sweats.  -HEENT: Patient denies nonhealing oral sores.  -Skin: As above in HPI. No additional skin concerns.    Physical exam:  Vitals: [facility] 10/26/2018 (Exact Date)  GEN: This is a well developed, well-nourished female in no acute distress, in a pleasant mood.    SKIN: Waist-up skin, which includes the head/face, neck, both arms, chest, back, abdomen, digits and/or nails was examined.  - Maculopapular rash involving right breast, spreading to neck, right arm immediately distal to antecubital fossa, down to naval, and around to the right side.  - in the moment no active eczemtous lesions anymore, no oozing and no blisters. Some re-activation upper arm and axillar area and starting elbow flexural area    Impression/Plan:  1. Allergic Contact dermatitis    Start Gentian violet BID when able to get it    Continue  triamcinolone 0.1% BID for 3-4 more days    Plan for allergy patch test when symptoms resolve, orders as below    Continue Keflex as prescribed by surgical oncology     Use vanicream on skin after rash resolves    Okay to take both benadryl and zyrtec     Order for PATCH TESTS     [x] Outpatient                                              [] Inpatient: Ibrahim..../ Bed ....                                        Skin Atopy (atopic dermatitis)                  [] Yes   [] No  Rhinitis/Sinusitis:                                                            [x] Yes   [] No --> seasonal allergies (chronic coughing any time in the day ==> DDx Reflux)  Allergic Asthma:                                                              [] Yes   [x] No  Food Allergy:                                                                   [] Yes   [x] No  Leg ulcers:                                                                       [] Yes   [x] No  Hand eczema:                                                                  [] Yes   [x] No                                           Leading hand:   [] R   [] L       [] Ambidextrous                         Reason for tests (suspected allergy): adhesives (acrylates) or disinfectants (Chlorhexidine wipes)  Known previous allergies: none     Standardized panels  [x] Standard panel (40 tests)  [x] Preservatives & Antimicrobials (31 tests)  [] Emulsifiers & Additives (25 tests)   [] Perfumes/Flavours & Plants (25 tests)  [] Hairdresser panel (12 tests)  [] Rubber Chemicals (22 tests)  [x] Plastics (26 tests)  [] Colorants/Dyes/Food additives (20 tests)  [] Metals (implants/dental) (23 tests)  [] Local anaesthetics/NSAIDs (12 tests)  [x] Antibiotics & Antimycotics (14 tests)   [] Corticosteroids (15 tests)   [] Photopatch test (32 tests)   [] others: ...       RESULTS & EVALUATION of PATCH TESTS    Patch test readings after     [x] 2 days, [] 3 days [x] 4 days, [] 5 days,    Applied patch tests  with results (import here the list of patch tests):  Date/time of application:11/19/18  Physician/Nurse:  / Candace Gupta LPN               Localization of application: Back, in the moment without eczematous lesions    STANDARD Series         No Substance 2 days 4 days remarks   1 Clement Mix [C] - -    2 Colophony - -    3  2-Mercaptobenzothiazole  - -     4 Methylisothiazolinone - -    5 Carba Mix - -    6 Thiuram Mix [A] - -    7 Bisphenol A Epoxy Resin - -    8 I-Jmon-Unlqdslvsvl-Formaldehyde Resin - -    9 Mercapto Mix [A] - -    10 Black Rubber Mix- PPD [B] - -    11 Potassium Dichromate  -  -    12 Balsam of [place] (Myroxylon Pereirae Resin) - -    13 Nickel Sulphate Hexahydrate - -    14 Mixed Dialkyl Thiourea - -    15 Paraben Mix [B] - -    16 Methyldibromo Glutaronitrile - -    17 Fragrance Mix - -    18 2-Bromo-2-Nitropropane-1,3-Diol (Bronopol) - -    19 Lyral - -    20 Tixocortol-21- Pivalate - -    21 Diazolidiyl Urea (Germall II) - -    22 Methyl Methacrylate - -    23 Cobalt (II) Chloride Hexahydrate - -    24 Fragrance Mix II  - -    25 Compositae Mix - -    26 Benzoyl Peroxide - -    27 Bacitracin - -    28 Formaldehyde - -    29 Methylchloroisothiazolinone / Methylisothiazolinone - -    30 Corticosteroid Mix - -    31 Sodium Lauryl Sulfate - -    32 Lanolin Alcohol - -    33 Turpentine - -    34 Cetylstearylalcohol - -    35 Chlorhexidine Dicluconate - -    36 Budenoside - -    37 Imidazolidinyl Urea  - -    38 Ethyl-2 Cyanoacrylate - -    39 Quaternium 15 (Dowicil 200) - -    40 Decyl Glucoside - -      PLASTICS         No Substance 2 days 4 days remarks    Acrylates - -    41 2-Hydroxyethyl Methacrylate (HEMA) - -    42 1,4-Butandioldimethacrylate (BUDMA) - -    43  2-Ethylhexyl Acrylate - -    44 Bisphenol-A-Dimethacrylate  - -    45 Diurethane-Dimethacrylate - -    46 Ethyleneglycoldimethacrylate (EGDMA) - -    47 Pentaerythritoltriacrylate (MIRANDA) - -    48 Triethylene Glycol  Dimethacrylate (TEGDMA) - -     Synthetic material/additives       49 F-Kjsp-Oszrqecwqfn - -    50 Tricresyl Phosphate - -    51 9-Gegk-Gdpyataxcvbax - -    52 Bis (2-Ethylhexyl) Phthalate - -    53 Dibutylphthalate - -    54 Dimethylphthalate - -    55 Toluene-2,4-Diisocyanate - -    56 Diphenylmethane-4,4''-Diisocyanate - -     EPOXY RESIN SYSTEMS       Reactive Solvents - -    57 Cresyl Glycidyl Ether - -    58 Butyl Glycidyl Ether - -    59 Phenyl Glycidyl Ether - -    60 1,4-Butanediol Diglycidyl Ether - -    61 1,6-Hexanediole Diglycidyl Ether - -     Hardener / Accelerator - -    62 Ethylenediamine Dihydrochloride - -    63 Triethylenetetramine - -    64 Diethylenetriamine - -    65 Isophorone Diamine (IPD) - -    66 N,N-Dimethyl-P-Toluidine - -      ANTIBIOTICS & ANTIMYCOTICS         No Substance 2 days 4 days remarks   67 Erythromycine - -    68 Framycetine Sulphate - -    69 Fusidic Acid Sodium Salt - -    70 Gentamicin Sulphate - -    71 Neomycine Sulphate - -    72 Oxytetracycline  - -    73 Polymyxin B Sulphate - -    74 Tetracycline-HCL - -    75 Sulfanilamide - -    76 Metronidazole - -    77 Oxyquinoline Mix - -    78 Nitrofurazone - -    79 Nystatin - -    80 Clotrimazole - -      PRESERVATIVES & ANTIMICROBIALS         No Substance 2 days 4 days remarks   81  1,2-Benzisothiazoline-3-One, Sodium Salt - -    82  1,3,5-Osmin (2-Hydroxyethyl) - Hexahydrotriazine (Grotan BK) - -    83 3-Rbxxquncoupxf-6-Nitro-1, 3-Propanediol - -    84  3, 4, 4' - Triclocarban - -    85 4 - Chloro - 3 - Cresol - -    86 4 - Chloro - 4 - Xylenol (PCMX) - -    87 7-Ethylbicyclooxazolidine (Bioban QI0523) - -    88 Benzalkonium Chloride - -    89 Benzyl Alcohol - -    90 Cetalkonium Chloride - -    91 Cetylpyrimidine Chloride  - -    92 Chloroacetamide - -    93 DMDM Hydantoin - -    94 Glutaraldehyde - -    95 Triclosan - -    96 Glyoxal Trimeric Dihydrate - -    97 Iodopropynyl Butylcarbamate - -    98 Octylisothiazoline -  -    99 Iodoform - -    100 (Nitrobutyl) Morpholine/(Ethylnitro-Trimethylene) Dimorpholine (Bioban P 1487) - -    101 Phenoxyethanol - -    102 Phenyl Salicylate - -    103 Povidone Iodine - -    104 Sodium Benzoate - -    105 Sodium Disulfite - -    106 Sorbic Acid - -    107 Thimerosal - -     Parabens      108 Butyl-P-Hydroxybenzoate - -    109 Ethyl-P-Hydroxybenzoate - -    110 Methyl-P-Hydroxybenzoate - -    111 Propyl-P-Hydroxybenzoate - -      Results of patch tests:                         Interpretation:    - Negative                    A    = Allergic      (+) Erythema    TI   = Toxic/irritant   + E + Infiltration    RaP = Relevance at Present     ++ E/I + Papulovesicle   Rpr  = Relevance Previously     +++ E/I/P + Blister     nR   = No Relevance    [] No relevant allergic reaction observed    [] Allergic reaction diagnosed against: see later      Interpretation/ remarks:   See later    [] Patient information given   [] ACSD information   [] SmartPractice information    ==> final Diagnosis:  See later    ==> Treatment prescribed/Plan:  See later      These conclusionsare made at the best of ones knowledge and belief  based on the provided evidence such as patients history and allergy test results and they can change over time or can be incomplete because of missing informations.

## 2018-11-19 NOTE — MR AVS SNAPSHOT
After Visit Summary   11/19/2018    Gabriella Villarreal    MRN: [id number]           Patient Information     Date Of Birth          1965        Visit Information        Provider Department      11/19/2018 1:00 PM Karen Berrios MD [facility]         Follow-ups after your visit        Your next 10 appointments already scheduled     Jan 28, 2019 10:30 AM CST   (Arrive by 10:15 AM)   Return Visit with aKren Berrios MD   [facility] ([facility])    [address] [place] [address]  [address] [place] [id number] [phone]            Feb 08, 2019  1:15 PM CST   (Arrive by 1:00 PM)   Post-Op with Kalina Oviedo MD   [facility])    [address] [place] [address]  [address] [place] [id number] [phone]              Who to contact     If you have questions or need follow up information about today's clinic visit or your schedule please contact [facility] directly at [phone].  Normal or non-critical lab and imaging results will be communicated to you by MyChart, letter or phone within 4 business days after the clinic has received the results. If you do not hear from us within 7 days, please contact the clinic through [employer] or phone. If you have a critical or abnormal lab result, we will notify you by phone as soon as possible.  Submit refill requests through [employer] or call your pharmacy and they will forward the refill request to us. Please allow 3 business days for your refill to be completed.          Additional Information About Your Visit        MyChart Information     [employer] gives you secure access to your electronic health record. If you see a primary care provider, you can also send messages to your care team and make appointments. If you have questions, please call your primary care clinic.  If you do not have a primary care provider, please call  "[phone] and they will assist you.        Care EveryWhere ID     This is your Care EveryWhere ID. This could be used by other organizations to access your [facility] medical records  [id number]        Your Vitals Were     Pulse Temperature Respirations Height Last Period Pulse Oximetry    88 97.7  F (36.5  C) (Oral) 14 1.626 m (5' 4.02\") 10/26/2018 (Exact Date) 99%    BMI (Body Mass Index)                   31.21 kg/m2            Blood Pressure from Last 3 Encounters:   11/19/18 124/84   11/06/18 123/78   10/27/18 129/84    Weight from Last 3 Encounters:   11/19/18 82.5 kg (181 lb 14.4 oz)   11/06/18 80 kg (176 lb 6 oz)   10/27/18 81.4 kg (179 lb 8 oz)              Today, you had the following     No orders found for display       Primary Care Provider Office Phone # Fax #    Anna TAVARES [place] [phone]       [place] [id number] [facility] [address]        Equal Access to Services     [facility]: Hadii abbie ku hadasho Sojames, waaxda luqadaha, qaybta kaalmada harley, clyde tidwell . So [facility] [phone].    ATENCIÓN: Si habla español, tiene a luna disposición servicios gratuitos de asistencia lingüística. Pastora al [phone].    We comply with applicable federal civil rights laws and [place] laws. We do not discriminate on the basis of race, color, national origin, age, disability, sex, sexual orientation, or gender identity.            Thank you!     Thank you for choosing [facility]  for your care. Our goal is always to provide you with excellent care. Hearing back from our patients is one way we can continue to improve our services. Please take a few minutes to complete the written survey that you may receive in the mail after your visit with us. Thank you!             Your Updated Medication List - Protect others around you: Learn how to safely use, store and throw away your medicines at www.disposemymeds.org.          This " list is accurate as of 11/19/18 11:59 PM.  Always use your most recent med list.                   Brand Name Dispense Instructions for use Diagnosis    ACETAMINOPHEN PO      Take 1,000 mg by mouth        albuterol 108 (90 Base) MCG/ACT inhaler    PROAIR HFA/PROVENTIL HFA/VENTOLIN HFA     Inhale 2-4 puffs into the lungs        cephALEXin 500 MG capsule    KEFLEX    28 capsule    Take 1 capsule (500 mg) by mouth 4 times daily    Malignant neoplasm of upper-outer quadrant of right breast in female, estrogen receptor positive (H)       chlorpheniramine 4 MG tablet    CHLOR-TRIMETON     Take 4 mg by mouth        diphenhydrAMINE 25 MG tablet    BENADRYL          emollient cream     57 g    Apply topically as needed for other (for dry skin)    Allergic contact dermatitis, unspecified trigger       fluticasone 50 MCG/ACT spray    FLONASE     INSTILL 2 SPRAYS INTO BOTH NOSTRILS DAILY.        * gentian violet 2 % solution     59 mL    Apply 1 mL topically 2 times daily For affected area 1 - 2 times a day. Apply to area before applying triamcinolone cream    Allergic contact dermatitis, unspecified trigger       * gentian violet 1 % solution     59 mL    Apply 0.5 mLs topically 3 times daily    Allergic contact dermatitis due to other agents       ibuprofen 200 MG tablet    ADVIL/MOTRIN     Take 200 mg by mouth        levothyroxine 100 MCG tablet    SYNTHROID/LEVOTHROID     Take 100 mcg by mouth every morning        oxyCODONE 5 MG tablet    ROXICODONE    6 tablet    Take 1-2 tablets (5-10 mg) by mouth every 4 hours as needed for moderate to severe pain    Malignant neoplasm of lower-outer quadrant of right breast of female, estrogen receptor positive (H)       ranitidine 150 MG tablet    ZANTAC    60 tablet    Take 150 mg by mouth 2 times daily        senna-docusate 8.6-50 MG per tablet    SENOKOT-S;PERICOLACE    30 tablet    Take 1-2 tablets by mouth 2 times daily    Malignant neoplasm of lower-outer quadrant of right breast  of female, estrogen receptor positive (H)       triamcinolone 0.1 % cream    KENALOG    80 g    Apply topically 2 times daily    Allergic contact dermatitis, unspecified trigger       * Notice:  This list has 2 medication(s) that are the same as other medications prescribed for you. Read the directions carefully, and ask your doctor or other care provider to review them with you.

## 2018-11-19 NOTE — MR AVS SNAPSHOT
After Visit Summary   11/19/2018    Gabriella Villarreal    MRN: [id number]           Patient Information     Date Of Birth          1965        Visit Information        Provider Department      11/19/2018 2:00 PM Mj Simon MD [facility]        Today's Diagnoses     Allergic contact dermatitis due to other agents    -  1       Follow-ups after your visit        Your next 10 appointments already scheduled     Nov 21, 2018  1:15 PM CST   (Arrive by 1:00 PM)   Return Allergy with Mj Simon MD   [facility] ([facility])    [address] [place] [id number] [phone]            Nov 23, 2018  9:00 AM CST   (Arrive by 8:45 AM)   Return Allergy with Mj Simon MD   [facility] ([facility])    [address] [place] [id number] [phone]            Feb 08, 2019  1:15 PM CST   (Arrive by 1:00 PM)   Post-Op with Kalina Oviedo MD   [facility] ([facility])    [address] [place] [address]  [address] [place] [id number] [phone]              Who to contact     Please call your clinic at [phone] to:    Ask questions about your health    Make or cancel appointments    Discuss your medicines    Learn about your test results    Speak to your doctor            Additional Information About Your Visit        [facility] [employer] Information     [employer] gives you secure access to your electronic health record. If you see a primary care provider, you can also send messages to your care team and make appointments. If you have questions, please call your primary care clinic.  If you do not have a primary care provider, please call [phone] and they will assist you.      [employer] is an electronic gateway that provides easy, online access to your medical records. With [employer], you can request a clinic appointment, read your test  results, renew a prescription or communicate with your care team.     To access your existing account, please contact your [facility] or call [phone] for assistance.        Care EveryWhere ID     This is your Care EveryWhere ID. This could be used by other organizations to access your [facility] medical records  [id number]        Your Vitals Were     Last Period                   10/26/2018 (Exact Date)            Blood Pressure from Last 3 Encounters:   11/19/18 124/84   11/06/18 123/78   10/27/18 129/84    Weight from Last 3 Encounters:   11/19/18 82.5 kg (181 lb 14.4 oz)   11/06/18 80 kg (176 lb 6 oz)   10/27/18 81.4 kg (179 lb 8 oz)              We Performed the Following     PATCH TESTS, EACH     PATCH TESTS, EACH        Primary Care Provider Office Phone # Fax #    Anna CHAPIN Renan [phone]       [address] [address]        Equal Access to Services     LUIGI BIRD : Hadii aad ku hadasho Soomaali, waaxda luqadaha, qaybta kaalmada adeegyada, waxay idiin hayconorn antonio tidwell . So [facility] [phone].    ATENCIÓN: Si habla español, tiene a luna disposición servicios gratuitos de asistencia lingüística. Llame al [phone].    We comply with applicable federal civil rights laws and [place] laws. We do not discriminate on the basis of race, color, national origin, age, disability, sex, sexual orientation, or gender identity.            Thank you!     Thank you for choosing [facility]  for your care. Our goal is always to provide you with excellent care. Hearing back from our patients is one way we can continue to improve our services. Please take a few minutes to complete the written survey that you may receive in the mail after your visit with us. Thank you!             Your Updated Medication List - Protect others around you: Learn how to safely use, store and throw away your medicines at  www.disposemymeds.org.          This list is accurate as of 11/19/18  3:13 PM.  Always use your most recent med list.                   Brand Name Dispense Instructions for use Diagnosis    ACETAMINOPHEN PO      Take 1,000 mg by mouth        albuterol 108 (90 Base) MCG/ACT inhaler    PROAIR HFA/PROVENTIL HFA/VENTOLIN HFA     Inhale 2-4 puffs into the lungs        cephALEXin 500 MG capsule    KEFLEX    28 capsule    Take 1 capsule (500 mg) by mouth 4 times daily    Malignant neoplasm of upper-outer quadrant of right breast in female, estrogen receptor positive (H)       chlorpheniramine 4 MG tablet    CHLOR-TRIMETON     Take 4 mg by mouth        diphenhydrAMINE 25 MG tablet    BENADRYL          emollient cream     57 g    Apply topically as needed for other (for dry skin)    Allergic contact dermatitis, unspecified trigger       fluticasone 50 MCG/ACT spray    FLONASE     INSTILL 2 SPRAYS INTO BOTH NOSTRILS DAILY.        * gentian violet 2 % solution     59 mL    Apply 1 mL topically 2 times daily For affected area 1 - 2 times a day. Apply to area before applying triamcinolone cream    Allergic contact dermatitis, unspecified trigger       * gentian violet 1 % solution     59 mL    Apply 0.5 mLs topically 3 times daily    Allergic contact dermatitis due to other agents       ibuprofen 200 MG tablet    ADVIL/MOTRIN     Take 200 mg by mouth        levothyroxine 100 MCG tablet    SYNTHROID/LEVOTHROID     Take 100 mcg by mouth every morning        oxyCODONE IR 5 MG tablet    ROXICODONE    6 tablet    Take 1-2 tablets (5-10 mg) by mouth every 4 hours as needed for moderate to severe pain    Malignant neoplasm of lower-outer quadrant of right breast of female, estrogen receptor positive (H)       ranitidine 150 MG tablet    ZANTAC    60 tablet    Take 150 mg by mouth 2 times daily        senna-docusate 8.6-50 MG per tablet    SENOKOT-S;PERICOLACE    30 tablet    Take 1-2 tablets by mouth 2 times daily    Malignant neoplasm  of lower-outer quadrant of right breast of female, estrogen receptor positive (H)       triamcinolone 0.1 % cream    KENALOG    80 g    Apply topically 2 times daily    Allergic contact dermatitis, unspecified trigger       * Notice:  This list has 2 medication(s) that are the same as other medications prescribed for you. Read the directions carefully, and ask your doctor or other care provider to review them with you.

## 2018-11-19 NOTE — NURSING NOTE
"ONCOLOGY CONSULT NOTE    Date of Visit: 11/19/2018     Reason for Consult:  Stage IA ER+/MD+/Her2- invasive ductal carcinoma of the right breast, s/p lumpectomy.    History of Present Illness:    Menstrual History:  Age of menarche: ~15 years  Pregnancies: 3  Breastfeeding: yes  Hormone replacement: OCPs for last several years  Menopause status: perimenopausal, still having periods ~monthly    Past Medical History    Past Surgical History:    Family History:    Current Medications:  Current Outpatient Prescriptions   Medication     ACETAMINOPHEN PO     albuterol (PROAIR HFA/PROVENTIL HFA/VENTOLIN HFA) 108 (90 Base) MCG/ACT inhaler     chlorpheniramine (CHLOR-TRIMETON) 4 MG tablet     diphenhydrAMINE (BENADRYL) 25 MG tablet     emollient (VANICREAM) cream     ibuprofen (ADVIL/MOTRIN) 200 MG tablet     levothyroxine (SYNTHROID/LEVOTHROID) 100 MCG tablet     triamcinolone (KENALOG) 0.1 % cream     cephALEXin (KEFLEX) 500 MG capsule     fluticasone (FLONASE) 50 MCG/ACT spray     gentian violet 1 % solution     gentian violet 2 % solution     oxyCODONE IR (ROXICODONE) 5 MG tablet     ranitidine (ZANTAC) 150 MG tablet     senna-docusate (SENOKOT-S;PERICOLACE) 8.6-50 MG per tablet     No current facility-administered medications for this visit.      Physical Exam:  /84 (BP Location: Right arm, Patient Position: Chair, Cuff Size: Adult Regular)  Pulse 88  Temp 97.7  F (36.5  C) (Oral)  Resp 14  Ht 1.626 m (5' 4.02\")  Wt 82.5 kg (181 lb 14.4 oz)  LMP 10/26/2018 (Exact Date)  SpO2 99%  BMI 31.21 kg/m2  General: Alert and interactive. Well appearing woman, in no acute distress.  HEENT: NC/AT, PERRLA, EOMI, anicteric sclera, oropharynx is pink and moist without erythema/exudates/lesions/thrush.  Neck: Supple, full ROM.  Lymphatic: No palpable mandibular/cervical/supra/infraclavicular lymph nodes.  Cardiovascular: RRR. Normal S1/S2. No murmurs. Peripheral pulses 2+ in bilateral upper and lower extremities. "   Respiratory: CTAB. No wheezes appreciated. Normal respiratory effort on ambient air.  Gastrointestinal: Soft, non-tender, non-distended, normoactive bowel sounds, no masses or organomegaly appreciated.  Musculoskeletal: No joint swelling or muscle tenderness.   Extremities: No extremity edema.   Skin: Warm and intact. No concerning lesions or rashes on exposed skin surfaces. No jaundice.  Neurologic: Alert and fully oriented, appropriately responsive during interview.    Labs:  Lab Results   Component Value Date    WBC 6.8 10/19/2018    HGB 13.3 10/19/2018    HCT 39.4 10/19/2018     10/19/2018     10/19/2018    BUN 10 10/19/2018    CO2 26 10/19/2018     SURGICAL PATHOLOGY, 10/24/18:  SPECIMEN(S):   A: Right breast mass   B: Right axillary sentinal lymph node # 1   C: Right breast, new inferior margin     FINAL DIAGNOSIS:   A. Right breast, lower outer quadrant, wire localized segmental   mastectomy:   - INVASIVE MAMMARY CARCINOMA OF NO SPECIAL TYPE (INVASIVE DUCTAL   CARCINOMA), JAJA GRADE 1, two foci   - Focus of invasive carcinoma with prior core biopsy site: size 18 mm   - Focus of invasive carcinoma slightly anterior and superior to first   focus: size 15 mm   - Ductal carcinoma in situ (DCIS), nuclear grade 2, solid, cribriform and   papillary types, with lobular   involvement and comedonecrosis   - DCIS involves an intraductal papilloma   - DCIS is admixed with and adjacent to both foci of invasive carcinoma,   and spans an estimated 3.5 cm   (extensive intraductal component)   - No lymphovascular invasion identified   - The inferior margin of this specimen is involved by invasive carcinoma   and DCIS (not a final margin, see   specimen C)   - Invasive carcinoma is 1 mm from the superior margin, 4 mm from the   posterior margin, 5 mm from the medial   margin and > 5 mm from the anterior and lateral margins   - DCIS is 2 mm from the superior margin, 3.5 mm from the medial margin, 5   mm  from the posterior margin and > 5   mm from the anterior and lateral margins   - Flat epithelial atypia   - Other findings: columnar cell change, intraductal papillomas,   fibrocystic change (including microcysts with   apocrine metaplasia) and usual ductal hyperplasia   - Calcifications associated with DCIS   - Prior core biopsy site changes   - Larger focus of invasive carcinoma is estrogen receptor positive (95%,   strong intensity), progesterone   receptor positive (98%, strong intensity) and reportedly HER2 equivocal   (2+ per outside report) by   immunohistochemistry and is reportedly HER2 non-amplified by FISH   (HER2:CEN17 ratio 1.06 per outside report)   (performed on prior core biopsy, see consult report [id number])   - HER2 FISH for the smaller focus of invasive carcinoma will be reported   separately by cytogenetics   - See comment for tumor synoptic   - See microscopic description     B. Lymph node, right axillary, sentinel # 1, excision:   - One benign lymph node (0/1)     C. Right breast, new inferior margin, excision:   - Atypical ductal hyperplasia   - Fibrocystic change (including microcysts with apocrine metaplasia   - No residual invasive or in situ carcinoma     COMMENT:   Invasive carcinoma and DCIS are 10 mm from the final inferior margin,   because the new inferior margin   (specimen C) is 10 mm thick and is uninvolved by invasive carcinoma and   DCIS.     The two foci of invasive carcinoma show very similar morphology.     Report Name: Breast Invasive Carcinoma        Status: Submitted Checklist Inst: 1      Last Updated By: Jazlyn Eduardo M.D., [facility], 11/04/2018   23:32:24   Part(s) Involved:   A: Right breast mass     Synoptic Report:     CLINICAL     Radiologic Finding:         - Mass or architectural distortion     SPECIMEN     Procedure:         - Excision (less than total mastectomy)     Specimen Laterality:         - Right     TUMOR     Tumor Site: Invasive Carcinoma:        "  - Lower outer quadrant       Clock Position of Tumor Site:           - 9 o'clock     Histologic Type:         - Invasive carcinoma of no special type (ductal, not otherwise   specified)     Histologic Grade (Dane Histologic Score)       Glandular (Acinar) / Tubular Differentiation:           - Score 1       Nuclear Pleomorphism:           - Score 2       Mitotic Rate:           - Score 1 (<=3 mitoses per mm2)       Overall Grade:           - Grade 1 (scores of 3, 4 or 5)     Tumor Size: 18 x 18 x 9 Millimeters (mm)     Tumor Focality:         - Multiple foci of invasive carcinoma       Number of Foci: 2       Sizes of Individual Foci: (1) 18 x 18 x 9 mm (2) 15 x 14 x 10 mm     Ductal Carcinoma In Situ (DCIS):         - DCIS is present in specimen       Ductal Carcinoma In Situ (DCIS):           - Positive for EIC       Size (Extent) of DCIS: 35 Millimeters (mm)       Number of Blocks with DCIS: 15       Number of Blocks Examined: 36       Architectural Patterns:           - Comedo           - Cribriform           - Papillary           - Solid           lobular involvement       Nuclear Grade:           - Grade II (intermediate)       Necrosis:           - Present, central (expansive \"comedo\" necrosis)     Lobular Carcinoma In Situ (LCIS):         - No LCIS in specimen     Accessory Findings       Lymphovascular Invasion:           - Not identified       Dermal Lymphovascular Invasion:           - No skin present       Microcalcifications:           - Present in DCIS       Treatment Effect:           - No known presurgical therapy     MARGINS     Invasive Carcinoma Margins:         - Uninvolved by invasive carcinoma       Distance from Closest Margin: 1 Millimeters (mm)       Closest Margin:           - Superior       Distance from Posterior Margin: 4 Millimeters (mm)       Distance from Superior Margin: 1 Millimeters (mm)       Distance from Medial Margin: 5 Millimeters (mm)     DCIS Margins:         - " Uninvolved by DCIS       Distance of DCIS from Closest Margin: 2 Millimeters (mm)       Closest Margin:           - Superior       Distance of DCIS to Posterior Margin: 5 Millimeters (mm)       Distance of DCIS to Superior Margin: 2 Millimeters (mm)       Distance of DCIS to Inferior Margin: 1 Millimeters (mm)       Distance of DCIS to Medial Margin: 3.5 Millimeters (mm)     LYMPH NODES     Number of Lymph Nodes with Macrometastases (> 2 mm): 0     Number of Lymph Nodes with Micrometastases (> 0.2 mm to 2 mm and / or >   200     cells): 0     Number of Lymph Nodes with Isolated Tumor Cells (<= 0.2 mm and <= 200   cells): 0     Number of Lymph Nodes Examined: 1     Number of Fenton Nodes Examined: 1     PATHOLOGIC STAGE CLASSIFICATION (PTNM, AJCC 8TH EDITION)     TNM Descriptors:         - m (multiple foci of invasive carcinoma)     Primary Tumor (Invasive Carcinoma) (pT):         - pT1c     Regional Lymph Nodes (pN)       Modifier:           - (sn): Only sentinel node(s) evaluated.       Category (pN):           - pN0     Imaging:    Assessment/Plan:    Sonia Koroma MD/PhD  Heme/Onc Fellow

## 2018-11-19 NOTE — LETTER
November 19, 2018      Gabriella Villarreal  [address] [facility] [address]              Dear Insurance Carrier,      One of your enrolled members, Gabriella Villarreal , has been referred by Dr.Paul Simon, who is a dermatoallergist, for Comprehensive Patch Testing. Comprehensive patch testing is medically   necessary for this patient.    COMPREHENSIVE PATCH TESTING IS INDICATED FOR PATIENTS WHO HAVE  CHRONIC DERMATITIS THAT HAS NOT IMPROVED AFTER RECEIVING  AGGRESSIVE TREATMENT BY DERMATOLOGISTS AND/OR ALLERGISTS  AND WHICH SEVERELY IMPACTS THE INDIVIDUAL S HEALTH AND  QUALITY OF LIFE.    This patient meets the above criteria and thus requires comprehensive patch testing. This  is likely to entail more than 80 units of CPT code 95910. In addition, Dr. Simon will spend  a substantial amount of time working to determine the cause of the dermatitis and then is  able to more precisely develop a personalized treatment plan.    This Comprehensive Patch Testing cannot be performed by most allergists or  dermatologists, who are only able to perform limited patch testing, which is inadequate or  inappropriate for the diagnosis of suspected allergy.    Please see the following document for additional information and many peer reviewed  references on the medical necessity of Comprehensive Patch Testing.

## 2018-11-19 NOTE — NURSING NOTE
"Oncology Rooming Note    November 19, 2018 1:08 PM   Gabriella Villarreal is a 53 year old female who presents for:    Chief Complaint   Patient presents with     Oncology Clinic Visit     [facility] NEW- MALIGNANT NEOPLASM OF RIGHT BREAST     Initial Vitals: /84 (BP Location: Right arm, Patient Position: Chair, Cuff Size: Adult Regular)  Pulse 88  Temp 97.7  F (36.5  C) (Oral)  Resp 14  Ht 1.626 m (5' 4.02\")  Wt 82.5 kg (181 lb 14.4 oz)  LMP 10/26/2018 (Exact Date)  SpO2 99%  BMI 31.21 kg/m2 Estimated body mass index is 31.21 kg/(m^2) as calculated from the following:    Height as of this encounter: 1.626 m (5' 4.02\").    Weight as of this encounter: 82.5 kg (181 lb 14.4 oz). Body surface area is 1.93 meters squared.  Mild Pain (2) Comment: Data Unavailable   Patient's last menstrual period was 10/26/2018 (exact date).  Allergies reviewed: Yes  Medications reviewed: Yes    Medications: Medication refills not needed today.  Pharmacy name entered into [facility]:    [facility] [place], [place] - [address] [place] [address] [phone]  [facility] [address] [place] [address] [place]  AT [address] [address] [place] [address] [facility]    Clinical concerns: New patientMike Berrios was notified.    10 minutes for nursing intake (face to face time)     Aleksander Rosenberg LPN            "

## 2018-11-19 NOTE — LETTER
11/19/2018       RE: Gabriella Villarreal  [address] [facility] [address]     Dear Colleague,    Thank you for referring your patient, Gabriella Villarreal, to the [facility]. Please see a copy of my visit note below.    Oncology Consultation  Date on this visit: 11/19/2018    Gabriella Villarreal  is referred by Dr.Jane Lyric Oviedo for an oncology consultation. She requires evaluation for new diagnosis of invasive mammary carcinoma of the right breast, s/p lumpectomy.     History Of Present Illness:  Ms. Villarreal is a 53 year old female who presents with new diagnosis of invasive mammary carcinoma involving the right breast. She underwent right sided wire-localized segmental mastectomy and sentinel lymph node biopsy on 10/24/18 with Dr. Kalina Oviedo. She was seen in follow-up in the post-operative period and has been noted to be healing well. Her course was complicated by a rash on the right breast and axilla that was pruritic and erythematous. There was some weeping as well. It eventually spread up into the right neck and down into the upper abdomen as well. She was seen by dermatology on 10/31/18 and was diagnosed with acute contact dermatitis. She was treated with steroids and the rash has significantly improved. She has a follow-up appointment with dermatology later today, for consideration of patch testing.    She reports feeling well today. She has gotten the results of her genetic testing and OncoTypeDx. There were no concerning inherited genetics. Her OncoType score is 13. She has done some of her own research, and understands that she will need radiation as well as likely endocrine therapy. She is concerned about the risks of endocrine therapy and asks questions regarding the additional benefit in terms of recurrence risk. Otherwise doing very well. No fevers or chills. Some minor swelling at the lateral inferior aspect of the right breast, incision is well-healed. Dry skin at the site  "of the incision and across the nipple.     Past Medical/Surgical History:  Past Medical History:   Diagnosis Date     Allergic rhinitis      Chronic cough      GERD (gastroesophageal reflux disease)      Hypothyroidism      Past Surgical History:   Procedure Laterality Date     CHOLECYSTECTOMY       L salpingooophorectomy  2015     LUMPECTOMY BREAST WITH SENTINEL NODE, COMBINED Right 10/24/2018    Procedure: Right Wire Localized Segmental Mastectomy (Lumpectomy), Right [place] Lymph Node Biopsy;  Surgeon: Kalina Oviedo MD;  Location: [facility] OR     Menstrual History:   Menarche around age 15.  Three pregnancies, uncomplicated.   the first two children.  Was not on OCPs until her mid-40s, at which time she started \"low-dose\" combined OCPs for painful periods.  Still having relatively regular monthly cycles, some just spotting, others \"normal periods\".    Allergies:  Allergies as of 11/19/2018 - Berto as Reviewed 11/19/2018   Allergen Reaction Noted     Seasonal allergies  10/24/2018     Current Medications:  Current Outpatient Prescriptions   Medication Sig Dispense Refill     ACETAMINOPHEN PO Take 1,000 mg by mouth       albuterol (PROAIR HFA/PROVENTIL HFA/VENTOLIN HFA) 108 (90 Base) MCG/ACT inhaler Inhale 2-4 puffs into the lungs       chlorpheniramine (CHLOR-TRIMETON) 4 MG tablet Take 4 mg by mouth        diphenhydrAMINE (BENADRYL) 25 MG tablet        emollient (VANICREAM) cream Apply topically as needed for other (for dry skin) 57 g 0     ibuprofen (ADVIL/MOTRIN) 200 MG tablet Take 200 mg by mouth       levothyroxine (SYNTHROID/LEVOTHROID) 100 MCG tablet Take 100 mcg by mouth every morning        triamcinolone (KENALOG) 0.1 % cream Apply topically 2 times daily 80 g 1     cephALEXin (KEFLEX) 500 MG capsule Take 1 capsule (500 mg) by mouth 4 times daily (Patient not taking: Reported on 11/19/2018) 28 capsule 0     fluticasone (FLONASE) 50 MCG/ACT spray INSTILL 2 SPRAYS INTO BOTH NOSTRILS DAILY.       " "gentian violet 1 % solution Apply 0.5 mLs topically 3 times daily (Patient not taking: Reported on 11/6/2018) 59 mL 1     gentian violet 2 % solution Apply 1 mL topically 2 times daily For affected area 1 - 2 times a day. Apply to area before applying triamcinolone cream (Patient not taking: Reported on 11/6/2018) 59 mL 3     oxyCODONE IR (ROXICODONE) 5 MG tablet Take 1-2 tablets (5-10 mg) by mouth every 4 hours as needed for moderate to severe pain (Patient not taking: Reported on 11/6/2018) 6 tablet 0     ranitidine (ZANTAC) 150 MG tablet Take 150 mg by mouth 2 times daily  60 tablet 1     senna-docusate (SENOKOT-S;PERICOLACE) 8.6-50 MG per tablet Take 1-2 tablets by mouth 2 times daily (Patient not taking: Reported on 11/6/2018) 30 tablet 0      Family History:  Family History   Problem Relation Age of Onset     Myocardial Infarction Mother      Coronary Artery Disease Mother      Arrhythmia Father      Prostate Cancer Father      Alcoholism Father      Alcoholism Sister      Diabetes Maternal Grandmother      No Known Problems Maternal Grandfather      No Known Problems Paternal Grandmother      HEART DISEASE Paternal Grandfather      Skin Cancer Other      Ovarian Cancer Maternal Aunt      Ovarian Cancer Other      Melanoma No family hx of      Social History:  Social History     Social History     Marital status:      Spouse name: N/A     Number of children: 3     Years of education: N/A     Occupational History     self-employed      Social History Main Topics     Smoking status: Never Smoker     Smokeless tobacco: Never Used     Alcohol use Yes      Comment: occasional     Drug use: No     Sexual activity: Not on file     Other Topics Concern     Not on file     Social History Narrative     Physical Exam:  /84 (BP Location: Right arm, Patient Position: Chair, Cuff Size: Adult Regular)  Pulse 88  Temp 97.7  F (36.5  C) (Oral)  Resp 14  Ht 1.626 m (5' 4.02\")  Wt 82.5 kg (181 lb 14.4 oz)  LMP " 10/26/2018 (Exact Date)  SpO2 99%  BMI 31.21 kg/m2    GENERAL APPEARANCE: healthy, alert and no distress     HENT: Mouth without ulcers or lesions     NECK: no adenopathy, no asymmetry or masses     LYMPHATICS: No cervical, supraclavicular, axillary or inguinal lymphadenopathy     RESP: lungs clear to auscultation - no rales, rhonchi or wheezes     BREAST: s/p lumpectomy with well-healed surgical incision without erythema, dry skin overlying nipple and surgical incision     CARDIOVASCULAR: regular rates and rhythm, normal S1 S2, no S3 or S4 and no murmur.     ABDOMEN:  soft, nontender, no HSM or masses and bowel sounds normal     MUSCULOSKELETAL: extremities normal- no gross deformities noted, no evidence of inflammation in joints, FROM in all extremities. No edema b/l LE.     SKIN: no suspicious lesions or rashes     PSYCHIATRIC: mentation appears normal and affect normal    Laboratory/Imaging Studies  Lab Results   Component Value Date    WBC 6.8 10/19/2018    HGB 13.3 10/19/2018    HCT 39.4 10/19/2018     10/19/2018     10/19/2018    BUN 10 10/19/2018    CO2 26 10/19/2018     SURGICAL PATHOLOGY, 10/24/18:  SPECIMEN(S):    A: Right breast mass    B: Right axillary sentinal lymph node #1    C: Right breast, new inferior margin   FINAL DIAGNOSIS:   A. Right breast, lower outer quadrant, wire localized segmental mastectomy:   - INVASIVE MAMMARY CARCINOMA OF NO SPECIAL TYPE (INVASIVE DUCTAL CARCINOMA), JAJA GRADE 1, two foci   - Focus of invasive carcinoma with prior core biopsy site: size 18 mm   - Focus of invasive carcinoma slightly anterior and superior to first focus: size 15 mm   - Ductal carcinoma in situ (DCIS), nuclear grade 2, solid, cribriform and papillary types, with lobular involvement and comedonecrosis   - DCIS involves an intraductal papilloma   - DCIS is admixed with and adjacent to both foci of invasive carcinoma, and spans an estimated 3.5 cm (extensive intraductal component)    - No lymphovascular invasion identified   - The inferior margin of this specimen is involved by invasive carcinoma and DCIS (not a final margin, see specimen C)   - Invasive carcinoma is 1 mm from the superior margin, 4 mm from the posterior margin, 5 mm from the medial margin and > 5 mm from the anterior and lateral margins   - DCIS is 2 mm from the superior margin, 3.5 mm from the medial margin, 5 mm from the posterior margin and > 5 mm from the anterior and lateral margins   - Flat epithelial atypia   - Other findings: columnar cell change, intraductal papillomas, fibrocystic change (including microcysts with apocrine metaplasia) and usual ductal hyperplasia   - Calcifications associated with DCIS   - Prior core biopsy site changes   - Larger focus of invasive carcinoma is estrogen receptor positive (95%, strong intensity), progesterone receptor positive (98%, strong intensity) and reportedly HER2 equivocal (2+ per outside report) by immunohistochemistry and is reportedly HER2 non-amplified by FISH (HER2:CEN17 ratio 1.06 per outside report)   - HER2 FISH for the smaller focus of invasive carcinoma will be reported separately by cytogenetics   - See comment for tumor synoptic   - See microscopic description     B. Lymph node, right axillary, sentinel # 1, excision:   - One benign lymph node (0/1)     C. Right breast, new inferior margin, excision:   - Atypical ductal hyperplasia   - Fibrocystic change (including microcysts with apocrine metaplasia   - No residual invasive or in situ carcinoma     COMMENT:   Invasive carcinoma and DCIS are 10 mm from the final inferior margin, because the new inferior margin (specimen C) is 10 mm thick and is uninvolved by invasive carcinoma and DCIS.     ASSESSMENT/PLAN:  Gabriella Villarreal is a 53 year old woman with recently diagnosed Stage IA invasive mammary carcinoma involving the right breast, ER+/CT+ and Her2 non-amplified (pT1c(2)N0). She underwent wire-localized  right-sided segmental mastectomy with Dr. Kalina Oviedo on 10/24/18. Her course was complicated by an intensely pruritic rash, which started in the right axilla and spread to the right breast, up to the neck and into the upper right abdomen. This was diagnosed as acute contact dermatitis and resolved with a course of steroids.     OncoTypeDX score is 13, indicating low-risk of distant recurrence and no additional benefit of chemotherapy. With her hormone-receptor positive disease, and perimenopausal status - we recommend adjuvant endocrine therapy with Tamoxifen. We discussed this in length today, with a plan to potentially start after she completes radiation. She is nervous about the risks of anti-estrogen therapy, though understands the reasons for it in terms of decreasing her risk of recurrence. She will do her own research and consider this over the next several weeks. We also discussed the benefits of exercise (150 minutes of aerobic activity per week), and she plans to start an exercise regimen after she's fully recovered from her surgery.    Will have her follow-up in oncology clinic in mid-January, after completion (or toward the end of) radiation. She will likely establish care with radiation oncology closer to home in [place]. Will need a DEXA scan prior to Tamoxifen.      Patient was seen and plan was discussed with Dr. Berrios.    Sonia Koroma MD/PhD  Heme/Onc Fellow      HISTORY OF PRESENT ILLNESS:  The patient was seen and evaluated by me with Dr. Koroma  She is a 53-year-old perimenopausal woman with a T1cN0 breast cancer involving her right breast, ER positive, NH positive, HER2 negative.  She underwent a wire localized right lumpectomy with sentinel lymph node biopsy 10/24/2018.  She has recovered from that.  She had Oncotype DX score of 13, giving a low risk of distant recurrence.      I spent the remainder of my time discussing with Gabriella that she has a low-risk Oncotype and I would not  recommend chemotherapy.  She has complete surgical resection.  I would recommend that she have adjuvant radiation therapy and a referral was placed for the [facility] closer to her home.      We spent the remainder of our time discussing the risk of recurrence.  I would recommend adjuvant endocrine therapy with tamoxifen.  She is currently perimenopausal.  We discussed that likely she would be on tamoxifen for 2-3 years and then switched over to an aromatase inhibitor.  She was given a handout with both of these medications as well as discussed the side effects of both of these treatment regimens.      I plan to see her back after her radiation therapy and begin endocrine therapy at that time.      Ultimately, she will need a baseline bone density scan.      She is recovering well and was given information regarding supportive care services.         Again, thank you for allowing me to participate in the care of your patient.      Sincerely,    Karen Berrios MD

## 2018-11-20 NOTE — PATIENT INSTRUCTIONS
Patch Testing Information  What are allergen patch tests?    Done to look for skin allergies that may be causing rashes and irritation    Patch testing is done over three appointments Monday (Allergen patches placed), Wednesday (Patches removed), and Friday (Skin examined by MD)      Test panels with small amounts of common substances that cause skin allergies are taped onto your skin (usually on the back).    If you are allergic, there will be a small area of irritation where the test was placed on your skin.    The substances are numbered, so it is easy to tell what is causing a skin reaction.     What do I need to do in preporation for the test?    Can not be on systemic steroids for 1 month prior    No topical steroids on the test area for 1 week prior (okay to use elsewhere)    No sunbathing or tanning for one week prior to testing    Take antihistamines as normal stop taking the day before that test.    Stop use of systemic steroids for 1 month prior (If not possible discuss with Alergy specialist)     Please wear dark colors the week of the test since we write on you with a dark marker that may transfer and stain clothing and bedding.     What should I do after the tests are placed?    Keep the area dry. No shower or getting your back wet from the time we see you on Monday until after your appointment on Friday. If you get the test area wet you are washing off the test and we could get false negative reactions.    Do not exercise or do activities that may cause sweating .    Put on more tape if the test panels start to come off.    If the marker applied by the nurse fades, you can use a dark pen to nicole around the panel sites.    Cover area when outside to avoid any sun exposure while the test is in place.     What can I expect?    Itching or burning at the test site may happen if you are allergic.  Do not rub or scratch. If itching or burning is not tolerable contact your doctor.    Your visits will be  Monday, Wednesday, and Friday.     The marker we draw on your back with ma take up to 5 weeks to wash off completely.     Reactions can sometimes occur 1 to 2 weyeks after the tests are applied. If this happens you can contact your doctor.    To contact your doctor you can either send a [employer] message or call [phone] ask lexie Maria

## 2018-11-20 NOTE — NURSING NOTE
Patient had 111 patch tests placed this visit.    Standard panel (40 tests)    Preservatives & Antimicrobials (31 tests)    Plastics (26 tests)    Antibiotics & Antimycotics (14 tests)

## 2018-11-21 ENCOUNTER — OFFICE VISIT (OUTPATIENT)
Dept: DERMATOLOGY | Facility: CLINIC | Age: 53
End: 2018-11-21
Payer: COMMERCIAL

## 2018-11-21 DIAGNOSIS — L23.89 ALLERGIC CONTACT DERMATITIS DUE TO OTHER AGENTS: Primary | ICD-10-CM

## 2018-11-21 ASSESSMENT — PAIN SCALES - GENERAL: PAINLEVEL: NO PAIN (0)

## 2018-11-21 NOTE — PROGRESS NOTES
[facility] Dermatology Note      Dermatology Problem List:  1. Allergic contact dermatitis    Encounter Date: Nov 21, 2018    CC:  Chief Complaint   Patient presents with     Allergies     Lauren is here for patch tetsing day 3         History of Present Illness:  Ms. Gabriella Villarreal is a 53 year old female who presents as a follow-up for rash on the right breast. The patient was last seen 10/31. She underwent lumpectomy with sentinel node removal on 10/24, and developed a vesicular maculopapular rash between the two incision sites on 10/26. The rash spread to her neck, torso, and right arm, it was warm to the touch, and pruritic. She was seen by surgical oncology and her wounds were not concerning for infection. She does have a history of atopy. At her last visit on 10/31 (see photo in Media), she was started on Gentian violet BID, triamcinolone 0.1% cream BID, and keflex per surgical oncology. She has been unable to get the gentian, but has been using the triamcinolone on her entire rash and taking keflex as prescribed. She also has been switching off between benadryl and zyrtec to control the pruritis.    Today she returns to clinic with much improvement in the erythema and swelling. She is concerned that the rash is continuing to spread very slowly down her right arm, around toward her back, across her chest, and down her abdomen. The rash is also persistently pruritic. However, she believes it has mostly been improving. She has not experienced fevers, chills, sweats, or increased pain at her incision sites. There has been no more drainage from the incision sites, nor has her rash been weeping or bleeding.    Of note, she had a small operation during which they used surgical glue in 2015. She did not react at that time.    Past Medical History:   Patient Active Problem List   Diagnosis     Malignant neoplasm of upper-outer quadrant of right breast in female, estrogen receptor positive (H)      Past Medical History:   Diagnosis Date     Allergic rhinitis      Chronic cough      GERD (gastroesophageal reflux disease)      Hypothyroidism      Past Surgical History:   Procedure Laterality Date     CHOLECYSTECTOMY       L salpingooophorectomy  2015     LUMPECTOMY BREAST WITH SENTINEL NODE, COMBINED Right 10/24/2018    Procedure: Right Wire Localized Segmental Mastectomy (Lumpectomy), Right Mobile Lymph Node Biopsy;  Surgeon: Kalina Oviedo MD;  Location:  OR       Social History:  Patient  reports that she has never smoked. She has never used smokeless tobacco. She reports that she drinks alcohol. She reports that she does not use illicit drugs.    Family History:  Family History   Problem Relation Age of Onset     Myocardial Infarction Mother      Coronary Artery Disease Mother      Arrhythmia Father      Prostate Cancer Father      Alcoholism Father      Alcoholism Sister      Diabetes Maternal Grandmother      No Known Problems Maternal Grandfather      No Known Problems Paternal Grandmother      HEART DISEASE Paternal Grandfather      Skin Cancer Other      Ovarian Cancer Maternal Aunt      Ovarian Cancer Other      Melanoma No family hx of        Medications:  Current Outpatient Prescriptions   Medication Sig Dispense Refill     ACETAMINOPHEN PO Take 1,000 mg by mouth       albuterol (PROAIR HFA/PROVENTIL HFA/VENTOLIN HFA) 108 (90 Base) MCG/ACT inhaler Inhale 2-4 puffs into the lungs       cephALEXin (KEFLEX) 500 MG capsule Take 1 capsule (500 mg) by mouth 4 times daily (Patient not taking: Reported on 11/19/2018) 28 capsule 0     chlorpheniramine (CHLOR-TRIMETON) 4 MG tablet Take 4 mg by mouth        diphenhydrAMINE (BENADRYL) 25 MG tablet        emollient (VANICREAM) cream Apply topically as needed for other (for dry skin) 57 g 0     fluticasone (FLONASE) 50 MCG/ACT spray INSTILL 2 SPRAYS INTO BOTH NOSTRILS DAILY.       gentian violet 1 % solution Apply 0.5 mLs topically 3 times daily  (Patient not taking: Reported on 11/6/2018) 59 mL 1     gentian violet 2 % solution Apply 1 mL topically 2 times daily For affected area 1 - 2 times a day. Apply to area before applying triamcinolone cream (Patient not taking: Reported on 11/6/2018) 59 mL 3     ibuprofen (ADVIL/MOTRIN) 200 MG tablet Take 200 mg by mouth       levothyroxine (SYNTHROID/LEVOTHROID) 100 MCG tablet Take 100 mcg by mouth every morning        oxyCODONE IR (ROXICODONE) 5 MG tablet Take 1-2 tablets (5-10 mg) by mouth every 4 hours as needed for moderate to severe pain (Patient not taking: Reported on 11/6/2018) 6 tablet 0     ranitidine (ZANTAC) 150 MG tablet Take 150 mg by mouth 2 times daily  60 tablet 1     senna-docusate (SENOKOT-S;PERICOLACE) 8.6-50 MG per tablet Take 1-2 tablets by mouth 2 times daily (Patient not taking: Reported on 11/6/2018) 30 tablet 0     triamcinolone (KENALOG) 0.1 % cream Apply topically 2 times daily 80 g 1     Allergies   Allergen Reactions     Seasonal Allergies          Review of Systems:  -As per HPI  -Constitutional: The patient denies fatigue, fevers, chills, unintended weight loss, and night sweats.  -HEENT: Patient denies nonhealing oral sores.  -Skin: As above in HPI. No additional skin concerns.    Physical exam:  Vitals: [facility] 10/26/2018 (Exact Date)  GEN: This is a well developed, well-nourished female in no acute distress, in a pleasant mood.    SKIN: Waist-up skin, which includes the head/face, neck, both arms, chest, back, abdomen, digits and/or nails was examined.  - Maculopapular rash involving right breast, spreading to neck, right arm immediately distal to antecubital fossa, down to naval, and around to the right side.  - in the moment no active eczemtous lesions anymore, no oozing and no blisters. Some re-activation upper arm and axillar area and starting elbow flexural area    Impression/Plan:  1. Allergic Contact dermatitis    Start Gentian violet BID when able to get it    Continue  triamcinolone 0.1% BID for 3-4 more days    Plan for allergy patch test when symptoms resolve, orders as below    Continue Keflex as prescribed by surgical oncology     Use vanicream on skin after rash resolves    Okay to take both benadryl and zyrtec     Order for PATCH TESTS     [x] Outpatient                                              [] Inpatient: Ibrahim..../ Bed ....                                        Skin Atopy (atopic dermatitis)                  [] Yes   [] No  Rhinitis/Sinusitis:                                                            [x] Yes   [] No --> seasonal allergies (chronic coughing any time in the day ==> DDx Reflux)  Allergic Asthma:                                                              [] Yes   [x] No  Food Allergy:                                                                   [] Yes   [x] No  Leg ulcers:                                                                       [] Yes   [x] No  Hand eczema:                                                                  [] Yes   [x] No                                           Leading hand:   [] R   [] L       [] Ambidextrous                         Reason for tests (suspected allergy): adhesives (acrylates) or disinfectants (Chlorhexidine wipes)  Known previous allergies: none     Standardized panels  [x] Standard panel (40 tests)  [x] Preservatives & Antimicrobials (31 tests)  [] Emulsifiers & Additives (25 tests)   [] Perfumes/Flavours & Plants (25 tests)  [] Hairdresser panel (12 tests)  [] Rubber Chemicals (22 tests)  [x] Plastics (26 tests)  [] Colorants/Dyes/Food additives (20 tests)  [] Metals (implants/dental) (23 tests)  [] Local anaesthetics/NSAIDs (12 tests)  [x] Antibiotics & Antimycotics (14 tests)   [] Corticosteroids (15 tests)   [] Photopatch test (32 tests)   [] others: ...       RESULTS & EVALUATION of PATCH TESTS    Patch test readings after     [x] 2 days, [] 3 days [x] 4 days, [] 5 days,    Applied patch tests  with results (import here the list of patch tests):  Date/time of application:11/19/18  Physician/Nurse:  / Candace Gupta LPN               Localization of application: Back, in the moment without eczematous lesions    STANDARD Series         No Substance 2 days 4 days remarks   1 Clement Mix [C] - -    2 Colophony - -    3  2-Mercaptobenzothiazole  - -     4 Methylisothiazolinone - -    5 Carba Mix - -    6 Thiuram Mix [A] - -    7 Bisphenol A Epoxy Resin - -    8 R-Ulqq-Gblfeibozon-Formaldehyde Resin - -    9 Mercapto Mix [A] - -    10 Black Rubber Mix- PPD [B] - -    11 Potassium Dichromate  -  -    12 Balsam of [place] (Myroxylon Pereirae Resin) - -    13 Nickel Sulphate Hexahydrate - -    14 Mixed Dialkyl Thiourea - -    15 Paraben Mix [B] - -    16 Methyldibromo Glutaronitrile - -    17 Fragrance Mix - -    18 2-Bromo-2-Nitropropane-1,3-Diol (Bronopol) - -    19 Lyral - -    20 Tixocortol-21- Pivalate - -    21 Diazolidiyl Urea (Germall II) - -    22 Methyl Methacrylate - -    23 Cobalt (II) Chloride Hexahydrate - -    24 Fragrance Mix II  - -    25 Compositae Mix - -    26 Benzoyl Peroxide (+) -    27 Bacitracin - -    28 Formaldehyde - -    29 Methylchloroisothiazolinone / Methylisothiazolinone - -    30 Corticosteroid Mix - -    31 Sodium Lauryl Sulfate - -    32 Lanolin Alcohol - -    33 Turpentine - -    34 Cetylstearylalcohol - -    35 Chlorhexidine Dicluconate - -    36 Budenoside - -    37 Imidazolidinyl Urea  - -    38 Ethyl-2 Cyanoacrylate - -    39 Quaternium 15 (Dowicil 200) - -    40 Decyl Glucoside - -      PLASTICS         No Substance 2 days 4 days remarks    Acrylates - -    41 2-Hydroxyethyl Methacrylate (HEMA) - -    42 1,4-Butandioldimethacrylate (BUDMA) - -    43  2-Ethylhexyl Acrylate - -    44 Bisphenol-A-Dimethacrylate  - -    45 Diurethane-Dimethacrylate - -    46 Ethyleneglycoldimethacrylate (EGDMA) - -    47 Pentaerythritoltriacrylate (MIRANDA) - -    48 Triethylene Glycol  Dimethacrylate (TEGDMA) - -     Synthetic material/additives       49 L-Jcie-Mtuisffmxuf - -    50 Tricresyl Phosphate - -    51 6-Xtgw-Irxpczgvhpsqy - -    52 Bis (2-Ethylhexyl) Phthalate - -    53 Dibutylphthalate - -    54 Dimethylphthalate - -    55 Toluene-2,4-Diisocyanate - -    56 Diphenylmethane-4,4''-Diisocyanate - -     EPOXY RESIN SYSTEMS       Reactive Solvents - -    57 Cresyl Glycidyl Ether - -    58 Butyl Glycidyl Ether - -    59 Phenyl Glycidyl Ether - -    60 1,4-Butanediol Diglycidyl Ether - -    61 1,6-Hexanediole Diglycidyl Ether - -     Hardener / Accelerator - -    62 Ethylenediamine Dihydrochloride - -    63 Triethylenetetramine - -    64 Diethylenetriamine - -    65 Isophorone Diamine (IPD) - -    66 N,N-Dimethyl-P-Toluidine - -      ANTIBIOTICS & ANTIMYCOTICS         No Substance 2 days 4 days remarks   67 Erythromycine - -    68 Framycetine Sulphate - -    69 Fusidic Acid Sodium Salt - -    70 Gentamicin Sulphate - -    71 Neomycine Sulphate - -    72 Oxytetracycline  - -    73 Polymyxin B Sulphate - -    74 Tetracycline-HCL - -    75 Sulfanilamide - -    76 Metronidazole - -    77 Oxyquinoline Mix (+) -    78 Nitrofurazone - -    79 Nystatin - -    80 Clotrimazole - -      PRESERVATIVES & ANTIMICROBIALS         No Substance 2 days 4 days remarks   81  1,2-Benzisothiazoline-3-One, Sodium Salt - -    82  1,3,5-Osmin (2-Hydroxyethyl) - Hexahydrotriazine (Grotan BK) - -    83 4-Pqhgzgqfsgvza-2-Nitro-1, 3-Propanediol - -    84  3, 4, 4' - Triclocarban - -    85 4 - Chloro - 3 - Cresol - -    86 4 - Chloro - 4 - Xylenol (PCMX) - -    87 7-Ethylbicyclooxazolidine (Bioban DL7850) - -    88 Benzalkonium Chloride - -    89 Benzyl Alcohol - -    90 Cetalkonium Chloride - -    91 Cetylpyrimidine Chloride  - -    92 Chloroacetamide - -    93 DMDM Hydantoin - -    94 Glutaraldehyde - -    95 Triclosan - -    96 Glyoxal Trimeric Dihydrate - -    97 Iodopropynyl Butylcarbamate - -    98 Octylisothiazoline  ++ -    99 Iodoform - -    100 (Nitrobutyl) Morpholine/(Ethylnitro-Trimethylene) Dimorpholine (Bioban P 1487) - -    101 Phenoxyethanol - -    102 Phenyl Salicylate - -    103 Povidone Iodine - -    104 Sodium Benzoate - -    105 Sodium Disulfite - -    106 Sorbic Acid - -    107 Thimerosal - -     Parabens      108 Butyl-P-Hydroxybenzoate - -    109 Ethyl-P-Hydroxybenzoate - -    110 Methyl-P-Hydroxybenzoate - -    111 Propyl-P-Hydroxybenzoate - -      Results of patch tests:                         Interpretation:    - Negative                    A    = Allergic      (+) Erythema    TI   = Toxic/irritant   + E + Infiltration    RaP = Relevance at Present     ++ E/I + Papulovesicle   Rpr  = Relevance Previously     +++ E/I/P + Blister     nR   = No Relevance    [] No relevant allergic reaction observed    [x] Allergic reaction diagnosed against: Octylisothiazolinone      Interpretation/ remarks:   Octylisothiazolinone = Preservative in paints, inks, textiles and can be found in acrylic sealants (adhesives?), paints/lacquers    [] Patient information given   [] ACSD information   [x] SmartPractice information: Octylisothiazolinone    ==> final Diagnosis:  See later    ==> Treatment prescribed/Plan:  See later      These conclusionsare made at the best of ones knowledge and belief  based on the provided evidence such as patients history and allergy test results and they can change over time or can be incomplete because of missing informations.

## 2018-11-21 NOTE — NURSING NOTE
Allergy Rooming Note    Gabriella Villarreal's goals for this visit include:   Chief Complaint   Patient presents with     Allergies     Lauren is here for patch tetsing day 3     Candace Gupta LPN

## 2018-11-21 NOTE — MR AVS SNAPSHOT
After Visit Summary   11/21/2018    Gabriella Villarreal    MRN: [id number]           Patient Information     Date Of Birth          1965        Visit Information        Provider Department      11/21/2018 1:15 PM Mj Simon MD [facility] Dermatology        Today's Diagnoses     Allergic contact dermatitis due to other agents    -  1       Follow-ups after your visit        Your next 10 appointments already scheduled     Nov 23, 2018  9:00 AM CST   (Arrive by 8:45 AM)   Return Allergy with Mj Simon MD   [facility] ([facility])    [address]  3rd Floor  [facility] [address] [phone]            Feb 08, 2019  1:15 PM CST   (Arrive by 1:00 PM)   Post-Op with Kalina Oviedo MD   [facility] ([facility])    [address] [place] [address]  [address]  [facility] [id number] [phone]              Who to contact     Please call your clinic at [phone] to:    Ask questions about your health    Make or cancel appointments    Discuss your medicines    Learn about your test results    Speak to your doctor            Additional Information About Your Visit        Be[employer] Information     [employer] gives you secure access to your electronic health record. If you see a primary care provider, you can also send messages to your care team and make appointments. If you have questions, please call your primary care clinic.  If you do not have a primary care provider, please call [phone] and they will assist you.      [employer] is an electronic gateway that provides easy, online access to your medical records. With [employer], you can request a clinic appointment, read your test results, renew a prescription or communicate with your care team.     To access your existing account, please contact your [facility] or call [phone] for assistance.        Care EveryWhere ID     This is  your Care EveryWhere ID. This could be used by other organizations to access your [place] [facility] medical records  [id number]        Your Vitals Were     Last Period                   10/26/2018 (Exact Date)            Blood Pressure from Last 3 Encounters:   11/19/18 124/84   11/06/18 123/78   10/27/18 129/84    Weight from Last 3 Encounters:   11/19/18 82.5 kg (181 lb 14.4 oz)   11/06/18 80 kg (176 lb 6 oz)   10/27/18 81.4 kg (179 lb 8 oz)              Today, you had the following     No orders found for display       Primary Care Provider Office Phone # Fax #    Anna CHAPIN Renan [phone]       [place] [address] [facility] [address]        Equal Access to Services     LUIGI BIRD : Hadii abbie londonoo Sojames, waaxda luqadaha, qaybta kaalmada adeegyada, clyde tidwell . So [facility] [phone].    ATENCIÓN: Si habla español, tiene a luna disposición servicios gratuitos de asistencia lingüística. Llame al [phone].    We comply with applicable federal civil rights laws and [place] laws. We do not discriminate on the basis of race, color, national origin, age, disability, sex, sexual orientation, or gender identity.            Thank you!     Thank you for choosing [facility]  for your care. Our goal is always to provide you with excellent care. Hearing back from our patients is one way we can continue to improve our services. Please take a few minutes to complete the written survey that you may receive in the mail after your visit with us. Thank you!             Your Updated Medication List - Protect others around you: Learn how to safely use, store and throw away your medicines at www.disposemymeds.org.          This list is accurate as of 11/21/18  1:46 PM.  Always use your most recent med list.                   Brand Name Dispense Instructions for use Diagnosis    ACETAMINOPHEN PO      Take 1,000 mg by mouth        albuterol 108 (90 Base)  MCG/ACT inhaler    PROAIR HFA/PROVENTIL HFA/VENTOLIN HFA     Inhale 2-4 puffs into the lungs        cephALEXin 500 MG capsule    KEFLEX    28 capsule    Take 1 capsule (500 mg) by mouth 4 times daily    Malignant neoplasm of upper-outer quadrant of right breast in female, estrogen receptor positive (H)       chlorpheniramine 4 MG tablet    CHLOR-TRIMETON     Take 4 mg by mouth        diphenhydrAMINE 25 MG tablet    BENADRYL          emollient cream     57 g    Apply topically as needed for other (for dry skin)    Allergic contact dermatitis, unspecified trigger       fluticasone 50 MCG/ACT spray    FLONASE     INSTILL 2 SPRAYS INTO BOTH NOSTRILS DAILY.        * gentian violet 2 % solution     59 mL    Apply 1 mL topically 2 times daily For affected area 1 - 2 times a day. Apply to area before applying triamcinolone cream    Allergic contact dermatitis, unspecified trigger       * gentian violet 1 % solution     59 mL    Apply 0.5 mLs topically 3 times daily    Allergic contact dermatitis due to other agents       ibuprofen 200 MG tablet    ADVIL/MOTRIN     Take 200 mg by mouth        levothyroxine 100 MCG tablet    SYNTHROID/LEVOTHROID     Take 100 mcg by mouth every morning        oxyCODONE 5 MG tablet    ROXICODONE    6 tablet    Take 1-2 tablets (5-10 mg) by mouth every 4 hours as needed for moderate to severe pain    Malignant neoplasm of lower-outer quadrant of right breast of female, estrogen receptor positive (H)       ranitidine 150 MG tablet    ZANTAC    60 tablet    Take 150 mg by mouth 2 times daily        senna-docusate 8.6-50 MG per tablet    SENOKOT-S;PERICOLACE    30 tablet    Take 1-2 tablets by mouth 2 times daily    Malignant neoplasm of lower-outer quadrant of right breast of female, estrogen receptor positive (H)       triamcinolone 0.1 % cream    KENALOG    80 g    Apply topically 2 times daily    Allergic contact dermatitis, unspecified trigger       * Notice:  This list has 2 medication(s)  that are the same as other medications prescribed for you. Read the directions carefully, and ask your doctor or other care provider to review them with you.

## 2018-11-23 ENCOUNTER — OFFICE VISIT (OUTPATIENT)
Dept: DERMATOLOGY | Facility: CLINIC | Age: 53
End: 2018-11-23
Payer: COMMERCIAL

## 2018-11-23 DIAGNOSIS — L23.89 ALLERGIC CONTACT DERMATITIS DUE TO OTHER AGENTS: Primary | ICD-10-CM

## 2018-11-23 ASSESSMENT — PAIN SCALES - GENERAL: PAINLEVEL: NO PAIN (0)

## 2018-11-23 NOTE — PROGRESS NOTES
[facility] Dermatology Note      Dermatology Problem List:  1. Allergic contact dermatitis    Encounter Date: Nov 23, 2018    CC:  Chief Complaint   Patient presents with     Allergies     Lauren is here for patch testing day 5         History of Present Illness:  Ms. Gabriella Villarreal is a 53 year old female who presents as a follow-up for rash on the right breast. The patient was last seen 10/31. She underwent lumpectomy with sentinel node removal on 10/24, and developed a vesicular maculopapular rash between the two incision sites on 10/26. The rash spread to her neck, torso, and right arm, it was warm to the touch, and pruritic. She was seen by surgical oncology and her wounds were not concerning for infection. She does have a history of atopy. At her last visit on 10/31 (see photo in Media), she was started on Gentian violet BID, triamcinolone 0.1% cream BID, and keflex per surgical oncology. She has been unable to get the gentian, but has been using the triamcinolone on her entire rash and taking keflex as prescribed. She also has been switching off between benadryl and zyrtec to control the pruritis.    Today she returns to clinic with much improvement in the erythema and swelling. She is concerned that the rash is continuing to spread very slowly down her right arm, around toward her back, across her chest, and down her abdomen. The rash is also persistently pruritic. However, she believes it has mostly been improving. She has not experienced fevers, chills, sweats, or increased pain at her incision sites. There has been no more drainage from the incision sites, nor has her rash been weeping or bleeding.    Of note, she had a small operation during which they used surgical glue in 2015. She did not react at that time.    Past Medical History:   Patient Active Problem List   Diagnosis     Malignant neoplasm of upper-outer quadrant of right breast in female, estrogen receptor positive (H)      Past Medical History:   Diagnosis Date     Allergic rhinitis      Chronic cough      GERD (gastroesophageal reflux disease)      Hypothyroidism      Past Surgical History:   Procedure Laterality Date     CHOLECYSTECTOMY       L salpingooophorectomy  2015     LUMPECTOMY BREAST WITH SENTINEL NODE, COMBINED Right 10/24/2018    Procedure: Right Wire Localized Segmental Mastectomy (Lumpectomy), Right [place] Lymph Node Biopsy;  Surgeon: Kalina Oviedo MD;  Location:  OR       Social History:  Patient  reports that she has never smoked. She has never used smokeless tobacco. She reports that she drinks alcohol. She reports that she does not use illicit drugs.    Family History:  Family History   Problem Relation Age of Onset     Myocardial Infarction Mother      Coronary Artery Disease Mother      Arrhythmia Father      Prostate Cancer Father      Alcoholism Father      Alcoholism Sister      Diabetes Maternal Grandmother      No Known Problems Maternal Grandfather      No Known Problems Paternal Grandmother      HEART DISEASE Paternal Grandfather      Skin Cancer Other      Ovarian Cancer Maternal Aunt      Ovarian Cancer Other      Melanoma No family hx of        Medications:  Current Outpatient Prescriptions   Medication Sig Dispense Refill     ACETAMINOPHEN PO Take 1,000 mg by mouth       albuterol (PROAIR HFA/PROVENTIL HFA/VENTOLIN HFA) 108 (90 Base) MCG/ACT inhaler Inhale 2-4 puffs into the lungs       cephALEXin (KEFLEX) 500 MG capsule Take 1 capsule (500 mg) by mouth 4 times daily (Patient not taking: Reported on 11/19/2018) 28 capsule 0     chlorpheniramine (CHLOR-TRIMETON) 4 MG tablet Take 4 mg by mouth        diphenhydrAMINE (BENADRYL) 25 MG tablet        emollient (VANICREAM) cream Apply topically as needed for other (for dry skin) 57 g 0     fluticasone (FLONASE) 50 MCG/ACT spray INSTILL 2 SPRAYS INTO BOTH NOSTRILS DAILY.       gentian violet 1 % solution Apply 0.5 mLs topically 3 times daily  (Patient not taking: Reported on 11/6/2018) 59 mL 1     gentian violet 2 % solution Apply 1 mL topically 2 times daily For affected area 1 - 2 times a day. Apply to area before applying triamcinolone cream (Patient not taking: Reported on 11/6/2018) 59 mL 3     ibuprofen (ADVIL/MOTRIN) 200 MG tablet Take 200 mg by mouth       levothyroxine (SYNTHROID/LEVOTHROID) 100 MCG tablet Take 100 mcg by mouth every morning        oxyCODONE IR (ROXICODONE) 5 MG tablet Take 1-2 tablets (5-10 mg) by mouth every 4 hours as needed for moderate to severe pain (Patient not taking: Reported on 11/6/2018) 6 tablet 0     ranitidine (ZANTAC) 150 MG tablet Take 150 mg by mouth 2 times daily  60 tablet 1     senna-docusate (SENOKOT-S;PERICOLACE) 8.6-50 MG per tablet Take 1-2 tablets by mouth 2 times daily (Patient not taking: Reported on 11/6/2018) 30 tablet 0     triamcinolone (KENALOG) 0.1 % cream Apply topically 2 times daily 80 g 1     Allergies   Allergen Reactions     Seasonal Allergies          Review of Systems:  -As per HPI  -Constitutional: The patient denies fatigue, fevers, chills, unintended weight loss, and night sweats.  -HEENT: Patient denies nonhealing oral sores.  -Skin: As above in HPI. No additional skin concerns.    Physical exam:  Vitals: [facility] 10/26/2018 (Exact Date)  GEN: This is a well developed, well-nourished female in no acute distress, in a pleasant mood.    SKIN: Waist-up skin, which includes the head/face, neck, both arms, chest, back, abdomen, digits and/or nails was examined.  - Maculopapular rash involving right breast, spreading to neck, right arm immediately distal to antecubital fossa, down to naval, and around to the right side.  - in the moment no active eczemtous lesions anymore, no oozing and no blisters. Some re-activation upper arm and axillar area and starting elbow flexural area       Order for PATCH TESTS     [x] Outpatient                                              [] Inpatient: Ibrahim..../ Bed  ....                                        Skin Atopy (atopic dermatitis)                  [] Yes   [] No  Rhinitis/Sinusitis:                                      [x] Yes   [] No --> seasonal allergies (chronic coughing any time in the day ==> DDx Reflux)  Allergic Asthma:                                          [] Yes   [x] No  Food Allergy:                                               [] Yes   [x] No  Leg ulcers:                                                   [] Yes   [x] No  Hand eczema:                                              [] Yes   [x] No                                           Leading hand:   [] R   [] L       [] Ambidextrous                         Reason for tests (suspected allergy): adhesives (acrylates) or disinfectants (Chlorhexidine wipes)  Known previous allergies: none     Standardized panels  [x] Standard panel (40 tests)  [x] Preservatives & Antimicrobials (31 tests)  [] Emulsifiers & Additives (25 tests)   [] Perfumes/Flavours & Plants (25 tests)  [] Hairdresser panel (12 tests)  [] Rubber Chemicals (22 tests)  [x] Plastics (26 tests)  [] Colorants/Dyes/Food additives (20 tests)  [] Metals (implants/dental) (23 tests)  [] Local anaesthetics/NSAIDs (12 tests)  [x] Antibiotics & Antimycotics (14 tests)   [] Corticosteroids (15 tests)   [] Photopatch test (32 tests)   [] others: ...       RESULTS & EVALUATION of PATCH TESTS    Patch test readings after     [x] 2 days, [] 3 days [x] 4 days, [] 5 days,    Applied patch tests with results (import here the list of patch tests):  Date/time of application:11/19/18  Physician/Nurse:  / Candace Gupta LPN               Localization of application: Back, in the moment without eczematous lesions    STANDARD Series         No Substance 2 days 4 days remarks   1 Clement Mix [C] - -    2 Colophony - -    3  2-Mercaptobenzothiazole  - -     4 Methylisothiazolinone - -    5 Carba Mix - -    6 Thiuram Mix [A] - -    7 Bisphenol A Epoxy  Resin - -    8 X-Pewo-Vwopifalcyq-Formaldehyde Resin - -    9 Mercapto Mix [A] - -    10 Black Rubber Mix- PPD [B] - -    11 Potassium Dichromate  -  -    12 Balsam of [place] (Myroxylon Pereirae Resin) - -    13 Nickel Sulphate Hexahydrate - -    14 Mixed Dialkyl Thiourea - -    15 Paraben Mix [B] - -    16 Methyldibromo Glutaronitrile - -    17 Fragrance Mix - -    18 2-Bromo-2-Nitropropane-1,3-Diol (Bronopol) - -    19 Lyral - -    20 Tixocortol-21- Pivalate - -    21 Diazolidiyl Urea (Germall II) - -    22 Methyl Methacrylate - -    23 Cobalt (II) Chloride Hexahydrate - -    24 Fragrance Mix II  - -    25 Compositae Mix - -    26 Benzoyl Peroxide (+) ++    27 Bacitracin - -    28 Formaldehyde - -    29 Methylchloroisothiazolinone / Methylisothiazolinone - -    30 Corticosteroid Mix - -    31 Sodium Lauryl Sulfate - -    32 Lanolin Alcohol - -    33 Turpentine - -    34 Cetylstearylalcohol - -    35 Chlorhexidine Dicluconate - -    36 Budenoside - -    37 Imidazolidinyl Urea  - -    38 Ethyl-2 Cyanoacrylate - +/++    39 Quaternium 15 (Dowicil 200) - -    40 Decyl Glucoside - -      PLASTICS         No Substance 2 days 4 days remarks    Acrylates - -    41 2-Hydroxyethyl Methacrylate (HEMA) - -    42 1,4-Butandioldimethacrylate (BUDMA) - -    43  2-Ethylhexyl Acrylate - -    44 Bisphenol-A-Dimethacrylate  - -    45 Diurethane-Dimethacrylate - -    46 Ethyleneglycoldimethacrylate (EGDMA) - -    47 Pentaerythritoltriacrylate (MIRANDA) - -    48 Triethylene Glycol Dimethacrylate (TEGDMA) - -     Synthetic material/additives       49 Z-Csab-Dbxrsxsbdjh - -    50 Tricresyl Phosphate - -    51 0-Qufe-Mzymubuwhggrc - -    52 Bis (2-Ethylhexyl) Phthalate - -    53 Dibutylphthalate - -    54 Dimethylphthalate - -    55 Toluene-2,4-Diisocyanate - -    56 Diphenylmethane-4,4''-Diisocyanate - -     EPOXY RESIN SYSTEMS       Reactive Solvents - -    57 Cresyl Glycidyl Ether - -    58 Butyl Glycidyl Ether - -    59 Phenyl Glycidyl  Ether - -    60 1,4-Butanediol Diglycidyl Ether - -    61 1,6-Hexanediole Diglycidyl Ether - -     Hardener / Accelerator - -    62 Ethylenediamine Dihydrochloride - -    63 Triethylenetetramine - -    64 Diethylenetriamine - -    65 Isophorone Diamine (IPD) - -    66 N,N-Dimethyl-P-Toluidine - -      ANTIBIOTICS & ANTIMYCOTICS         No Substance 2 days 4 days remarks   67 Erythromycine - -    68 Framycetine Sulphate - -    69 Fusidic Acid Sodium Salt - -    70 Gentamicin Sulphate - -    71 Neomycine Sulphate - -    72 Oxytetracycline  - -    73 Polymyxin B Sulphate - -    74 Tetracycline-HCL - -    75 Sulfanilamide - -    76 Metronidazole - -    77 Oxyquinoline Mix (+) -    78 Nitrofurazone - -    79 Nystatin - -    80 Clotrimazole - -      PRESERVATIVES & ANTIMICROBIALS         No Substance 2 days 4 days remarks   81  1,2-Benzisothiazoline-3-One, Sodium Salt - +    82  1,3,5-Osmin (2-Hydroxyethyl) - Hexahydrotriazine (Grotan BK) - -    83 8-Clandlgrjndva-0-Nitro-1, 3-Propanediol - -    84  3, 4, 4' - Triclocarban - -    85 4 - Chloro - 3 - Cresol - -    86 4 - Chloro - 4 - Xylenol (PCMX) - -    87 7-Ethylbicyclooxazolidine (Piccsyan TG1855) - -    88 Benzalkonium Chloride - -    89 Benzyl Alcohol - -    90 Cetalkonium Chloride - -    91 Cetylpyrimidine Chloride  - +    92 Chloroacetamide - -    93 DMDM Hydantoin - -    94 Glutaraldehyde - -    95 Triclosan - -    96 Glyoxal Trimeric Dihydrate - -    97 Iodopropynyl Butylcarbamate - -    98 Octylisothiazoline ++ ++    99 Iodoform - -    100 (Nitrobutyl) Morpholine/(Ethylnitro-Trimethylene) Dimorpholine (Bioban P 1487) - -    101 Phenoxyethanol - -    102 Phenyl Salicylate - -    103 Povidone Iodine - -    104 Sodium Benzoate - -    105 Sodium Disulfite - -    106 Sorbic Acid - -    107 Thimerosal - -     Parabens      108 Butyl-P-Hydroxybenzoate - -    109 Ethyl-P-Hydroxybenzoate - -    110 Methyl-P-Hydroxybenzoate - -    111 Propyl-P-Hydroxybenzoate - -       Results of patch tests:                         Interpretation:    - Negative                    A    = Allergic      (+) Erythema    TI   = Toxic/irritant   + E + Infiltration    RaP = Relevance at Present     ++ E/I + Papulovesicle   Rpr  = Relevance Previously     +++ E/I/P + Blister     nR   = No Relevance    [] No relevant allergic reaction observed    [x] Allergic reaction diagnosed against: Octylisothiazolinone      Interpretation/ remarks:   Most probably the reaction on the wounds on breast areas were due to the cyanoacrylates present in dermaglue. Not excluded an additional contact allergy in preservatives in acrylates such as Octylisothiazolinone and Benzisothiazolinone and maybe Benzoylperoxide as initiator of polymerisation.   The reaction to Cetylpyrimidium is maybe not very relevant.    [] Patient information given   [] ACSD information   [x] SmartP[employer] information: Octylisothiazolinone, Benzoylperoxide,  Benzisothiazolinone, Cetylpyridium chloride, Ethyl-2 Cyanoacrylate      ==> final Diagnosis:    --> Allergic Contact dermatitis with relevant contact sensitizations to:    Ethyl-2 Cyanoacrylate = main component in the sutureless dermabond acrylate    Octylisothiazolinone and Benzisothiazolinone = possibly present as preservatives in the Dermabond    Benzoylperoxide = in acne medications and as initiator/catalyst of polymerisation in acrylates    Cetylpryridinium chloride = disinfectant, maybe present in the wipes    ==> Treatment prescribed/Plan:    Start Gentian violet BID when able to get it    Continue triamcinolone 0.1% BID for 3-4 more days    --> avoid in future Dermabond and similar acrylates during surgery (sutureless surgergy) and use for disinfection pure Chlorhexidine, PVP Iodine or Alcohol.   --> keep in mind that Benzoylperoxide can be used as well in bone cements for implants.      These conclusions are made at the best of ones knowledge and belief  based on the provided evidence  such as patients history and allergy test results and they can change over time or can be incomplete because of missing informations.    Staff Physician Comments:  I saw and evaluated the patient with the resident and I agree with the assessment and plan as documented in the resident's note.    Mj Simon MD  Professor  Head of Dermato-Allergy Division  Department of Dermatology  [facility]  I spent a total of 35 min face to face with Gabriella Villarreal during today's office visit. About 50% of the time was spent counseling the patient and/or coordinating care regarding their allergy.

## 2018-11-23 NOTE — LETTER
11/23/2018       RE: Gabriella Villarreal  [address] [facility] [place] [facility] [id number]     Dear Colleague,    Thank you for referring your patient, Gabriella Villarreal, to the [facility] DERMAT[facility]Y at [facility]. Please see a copy of my visit note below.    [facility] Dermatology Note      Dermatology Problem List:  1. Allergic contact dermatitis    Encounter Date: Nov 23, 2018    CC:  Chief Complaint   Patient presents with     Allergies     Lauren is here for patch testing day 5         History of Present Illness:  Ms. Gabriella Villarreal is a 53 year old female who presents as a follow-up for rash on the right breast. The patient was last seen 10/31. She underwent lumpectomy with sentinel node removal on 10/24, and developed a vesicular maculopapular rash between the two incision sites on 10/26. The rash spread to her neck, torso, and right arm, it was warm to the touch, and pruritic. She was seen by surgical oncology and her wounds were not concerning for infection. She does have a history of atopy. At her last visit on 10/31 (see photo in Media), she was started on Gentian violet BID, triamcinolone 0.1% cream BID, and keflex per surgical oncology. She has been unable to get the gentian, but has been using the triamcinolone on her entire rash and taking keflex as prescribed. She also has been switching off between benadryl and zyrtec to control the pruritis.    Today she returns to clinic with much improvement in the erythema and swelling. She is concerned that the rash is continuing to spread very slowly down her right arm, around toward her back, across her chest, and down her abdomen. The rash is also persistently pruritic. However, she believes it has mostly been improving. She has not experienced fevers, chills, sweats, or increased pain at her incision sites. There has been no more drainage from the incision sites, nor has her rash been weeping or  bleeding.    Of note, she had a small operation during which they used surgical glue in 2015. She did not react at that time.    Past Medical History:   Patient Active Problem List   Diagnosis     Malignant neoplasm of upper-outer quadrant of right breast in female, estrogen receptor positive (H)     Past Medical History:   Diagnosis Date     Allergic rhinitis      Chronic cough      GERD (gastroesophageal reflux disease)      Hypothyroidism      Past Surgical History:   Procedure Laterality Date     CHOLECYSTECTOMY       L salpingooophorectomy  2015     LUMPECTOMY BREAST WITH SENTINEL NODE, COMBINED Right 10/24/2018    Procedure: Right Wire Localized Segmental Mastectomy (Lumpectomy), Right Lynn Lymph Node Biopsy;  Surgeon: Kalina Oviedo MD;  Location:  OR       Social History:  Patient  reports that she has never smoked. She has never used smokeless tobacco. She reports that she drinks alcohol. She reports that she does not use illicit drugs.    Family History:  Family History   Problem Relation Age of Onset     Myocardial Infarction Mother      Coronary Artery Disease Mother      Arrhythmia Father      Prostate Cancer Father      Alcoholism Father      Alcoholism Sister      Diabetes Maternal Grandmother      No Known Problems Maternal Grandfather      No Known Problems Paternal Grandmother      HEART DISEASE Paternal Grandfather      Skin Cancer Other      Ovarian Cancer Maternal Aunt      Ovarian Cancer Other      Melanoma No family hx of        Medications:  Current Outpatient Prescriptions   Medication Sig Dispense Refill     ACETAMINOPHEN PO Take 1,000 mg by mouth       albuterol (PROAIR HFA/PROVENTIL HFA/VENTOLIN HFA) 108 (90 Base) MCG/ACT inhaler Inhale 2-4 puffs into the lungs       cephALEXin (KEFLEX) 500 MG capsule Take 1 capsule (500 mg) by mouth 4 times daily (Patient not taking: Reported on 11/19/2018) 28 capsule 0     chlorpheniramine (CHLOR-TRIMETON) 4 MG tablet Take 4 mg by mouth         diphenhydrAMINE (BENADRYL) 25 MG tablet        emollient (VANICREAM) cream Apply topically as needed for other (for dry skin) 57 g 0     fluticasone (FLONASE) 50 MCG/ACT spray INSTILL 2 SPRAYS INTO BOTH NOSTRILS DAILY.       gentian violet 1 % solution Apply 0.5 mLs topically 3 times daily (Patient not taking: Reported on 11/6/2018) 59 mL 1     gentian violet 2 % solution Apply 1 mL topically 2 times daily For affected area 1 - 2 times a day. Apply to area before applying triamcinolone cream (Patient not taking: Reported on 11/6/2018) 59 mL 3     ibuprofen (ADVIL/MOTRIN) 200 MG tablet Take 200 mg by mouth       levothyroxine (SYNTHROID/LEVOTHROID) 100 MCG tablet Take 100 mcg by mouth every morning        oxyCODONE IR (ROXICODONE) 5 MG tablet Take 1-2 tablets (5-10 mg) by mouth every 4 hours as needed for moderate to severe pain (Patient not taking: Reported on 11/6/2018) 6 tablet 0     ranitidine (ZANTAC) 150 MG tablet Take 150 mg by mouth 2 times daily  60 tablet 1     senna-docusate (SENOKOT-S;PERICOLACE) 8.6-50 MG per tablet Take 1-2 tablets by mouth 2 times daily (Patient not taking: Reported on 11/6/2018) 30 tablet 0     triamcinolone (KENALOG) 0.1 % cream Apply topically 2 times daily 80 g 1     Allergies   Allergen Reactions     Seasonal Allergies          Review of Systems:  -As per HPI  -Constitutional: The patient denies fatigue, fevers, chills, unintended weight loss, and night sweats.  -HEENT: Patient denies nonhealing oral sores.  -Skin: As above in HPI. No additional skin concerns.    Physical exam:  Vitals: LMP 10/26/2018 (Exact Date)  GEN: This is a well developed, well-nourished female in no acute distress, in a pleasant mood.    SKIN: Waist-up skin, which includes the head/face, neck, both arms, chest, back, abdomen, digits and/or nails was examined.  - Maculopapular rash involving right breast, spreading to neck, right arm immediately distal to antecubital fossa, down to naval, and around to  the right side.  - in the moment no active eczemtous lesions anymore, no oozing and no blisters. Some re-activation upper arm and axillar area and starting elbow flexural area       Order for PATCH TESTS     [x] Outpatient                                              [] Inpatient: Ibrahim..../ Bed ....                                        Skin Atopy (atopic dermatitis)                  [] Yes   [] No  Rhinitis/Sinusitis:                                      [x] Yes   [] No --> seasonal allergies (chronic coughing any time in the day ==> DDx Reflux)  Allergic Asthma:                                          [] Yes   [x] No  Food Allergy:                                               [] Yes   [x] No  Leg ulcers:                                                   [] Yes   [x] No  Hand eczema:                                              [] Yes   [x] No                                           Leading hand:   [] R   [] L       [] Ambidextrous                         Reason for tests (suspected allergy): adhesives (acrylates) or disinfectants (Chlorhexidine wipes)  Known previous allergies: none     Standardized panels  [x] Standard panel (40 tests)  [x] Preservatives & Antimicrobials (31 tests)  [] Emulsifiers & Additives (25 tests)   [] Perfumes/Flavours & Plants (25 tests)  [] Hairdresser panel (12 tests)  [] Rubber Chemicals (22 tests)  [x] Plastics (26 tests)  [] Colorants/Dyes/Food additives (20 tests)  [] Metals (implants/dental) (23 tests)  [] Local anaesthetics/NSAIDs (12 tests)  [x] Antibiotics & Antimycotics (14 tests)   [] Corticosteroids (15 tests)   [] Photopatch test (32 tests)   [] others: ...       RESULTS & EVALUATION of PATCH TESTS    Patch test readings after     [x] 2 days, [] 3 days [x] 4 days, [] 5 days,    Applied patch tests with results (import here the list of patch tests):  Date/time of application:11/19/18  Physician/Nurse:  / Candace Gupta LPN               Localization of  application: Back, in the moment without eczematous lesions    STANDARD Series         No Substance 2 days 4 days remarks   1 Clement Mix [C] - -    2 Colophony - -    3  2-Mercaptobenzothiazole  - -     4 Methylisothiazolinone - -    5 Carba Mix - -    6 Thiuram Mix [A] - -    7 Bisphenol A Epoxy Resin - -    8 L-Flgz-Xaxuorxixtb-Formaldehyde Resin - -    9 Mercapto Mix [A] - -    10 Black Rubber Mix- PPD [B] - -    11 Potassium Dichromate  -  -    12 Balsam of [place] (Myroxylon Pereirae Resin) - -    13 Nickel Sulphate Hexahydrate - -    14 Mixed Dialkyl Thiourea - -    15 Paraben Mix [B] - -    16 Methyldibromo Glutaronitrile - -    17 Fragrance Mix - -    18 2-Bromo-2-Nitropropane-1,3-Diol (Bronopol) - -    19 Lyral - -    20 Tixocortol-21- Pivalate - -    21 Diazolidiyl Urea (Germall II) - -    22 Methyl Methacrylate - -    23 Cobalt (II) Chloride Hexahydrate - -    24 Fragrance Mix II  - -    25 Compositae Mix - -    26 Benzoyl Peroxide (+) ++    27 Bacitracin - -    28 Formaldehyde - -    29 Methylchloroisothiazolinone / Methylisothiazolinone - -    30 Corticosteroid Mix - -    31 Sodium Lauryl Sulfate - -    32 Lanolin Alcohol - -    33 Turpentine - -    34 Cetylstearylalcohol - -    35 Chlorhexidine Dicluconate - -    36 Budenoside - -    37 Imidazolidinyl Urea  - -    38 Ethyl-2 Cyanoacrylate - +/++    39 Quaternium 15 (Dowicil 200) - -    40 Decyl Glucoside - -      PLASTICS         No Substance 2 days 4 days remarks    Acrylates - -    41 2-Hydroxyethyl Methacrylate (HEMA) - -    42 1,4-Butandioldimethacrylate (BUDMA) - -    43  2-Ethylhexyl Acrylate - -    44 Bisphenol-A-Dimethacrylate  - -    45 Diurethane-Dimethacrylate - -    46 Ethyleneglycoldimethacrylate (EGDMA) - -    47 Pentaerythritoltriacrylate (MIRANDA) - -    48 Triethylene Glycol Dimethacrylate (TEGDMA) - -     Synthetic material/additives       49 E-Flyn-Slafeqfjefm - -    50 Tricresyl Phosphate - -    51 2-Azrf-Ztvtveydhlnfx - -    52 Bis  (2-Ethylhexyl) Phthalate - -    53 Dibutylphthalate - -    54 Dimethylphthalate - -    55 Toluene-2,4-Diisocyanate - -    56 Diphenylmethane-4,4''-Diisocyanate - -     EPOXY RESIN SYSTEMS       Reactive Solvents - -    57 Cresyl Glycidyl Ether - -    58 Butyl Glycidyl Ether - -    59 Phenyl Glycidyl Ether - -    60 1,4-Butanediol Diglycidyl Ether - -    61 1,6-Hexanediole Diglycidyl Ether - -     Hardener / Accelerator - -    62 Ethylenediamine Dihydrochloride - -    63 Triethylenetetramine - -    64 Diethylenetriamine - -    65 Isophorone Diamine (IPD) - -    66 N,N-Dimethyl-P-Toluidine - -      ANTIBIOTICS & ANTIMYCOTICS         No Substance 2 days 4 days remarks   67 Erythromycine - -    68 Framycetine Sulphate - -    69 Fusidic Acid Sodium Salt - -    70 Gentamicin Sulphate - -    71 Neomycine Sulphate - -    72 Oxytetracycline  - -    73 Polymyxin B Sulphate - -    74 Tetracycline-HCL - -    75 Sulfanilamide - -    76 Metronidazole - -    77 Oxyquinoline Mix (+) -    78 Nitrofurazone - -    79 Nystatin - -    80 Clotrimazole - -      PRESERVATIVES & ANTIMICROBIALS         No Substance 2 days 4 days remarks   81  1,2-Benzisothiazoline-3-One, Sodium Salt - +    82  1,3,5-Osmin (2-Hydroxyethyl) - Hexahydrotriazine (Grotan BK) - -    83 9-Bkasxywimxrcd-4-Nitro-1, 3-Propanediol - -    84  3, 4, 4' - Triclocarban - -    85 4 - Chloro - 3 - Cresol - -    86 4 - Chloro - 4 - Xylenol (PCMX) - -    87 7-Ethylbicyclooxazolidine (Bioban [id number]) - -    88 Benzalkonium Chloride - -    89 Benzyl Alcohol - -    90 Cetalkonium Chloride - -    91 Cetylpyrimidine Chloride  - +    92 Chloroacetamide - -    93 DMDM Hydantoin - -    94 Glutaraldehyde - -    95 Triclosan - -    96 Glyoxal Trimeric Dihydrate - -    97 Iodopropynyl Butylcarbamate - -    98 Octylisothiazoline ++ ++    99 Iodoform - -    100 (Nitrobutyl) Morpholine/(Ethylnitro-Trimethylene) Dimorpholine ([facility] P 1487) - -    101 Phenoxyethanol - -    102 Phenyl  Salicylate - -    103 Povidone Iodine - -    104 Sodium Benzoate - -    105 Sodium Disulfite - -    106 Sorbic Acid - -    107 Thimerosal - -     Parabens      108 Butyl-P-Hydroxybenzoate - -    109 Ethyl-P-Hydroxybenzoate - -    110 Methyl-P-Hydroxybenzoate - -    111 Propyl-P-Hydroxybenzoate - -      Results of patch tests:                         Interpretation:    - Negative                    A    = Allergic      (+) Erythema    TI   = Toxic/irritant   + E + Infiltration    RaP = Relevance at Present     ++ E/I + Papulovesicle   Rpr  = Relevance Previously     +++ E/I/P + Blister     nR   = No Relevance    [] No relevant allergic reaction observed    [x] Allergic reaction diagnosed against: Octylisothiazolinone      Interpretation/ remarks:   Most probably the reaction on the wounds on breast areas were due to the cyanoacrylates present in dermaglue. Not excluded an additional contact allergy in preservatives in acrylates such as Octylisothiazolinone and Benzisothiazolinone and maybe Benzoylperoxide as initiator of polymerisation.   The reaction to Cetylpyrimidium is maybe not very relevant.    [] Patient information given   [] [facility] information   [x] SmartPractice information: Octylisothiazolinone, Benzoylperoxide,  Benzisothiazolinone, Cetylpyridium chloride, Ethyl-2 Cyanoacrylate      ==> final Diagnosis:    --> Allergic Contact dermatitis with relevant contact sensitizations to:    Ethyl-2 Cyanoacrylate = main component in the sutureless dermabond acrylate    Octylisothiazolinone and Benzisothiazolinone = possibly present as preservatives in the Dermabond    Benzoylperoxide = in acne medications and as initiator/catalyst of polymerisation in acrylates    Cetylpryridinium chloride = disinfectant, maybe present in the wipes    ==> Treatment prescribed/Plan:    Start Gentian violet BID when able to get it    Continue triamcinolone 0.1% BID for 3-4 more days    --> avoid in future Dermabond and similar acrylates  during surgery (sutureless surgergy) and use for disinfection pure Chlorhexidine, PVP Iodine or Alcohol.   --> keep in mind that Benzoylperoxide can be used as well in bone cements for implants.      These conclusions are made at the best of ones knowledge and belief  based on the provided evidence such as patients history and allergy test results and they can change over time or can be incomplete because of missing informations.    Staff Physician Comments:  I saw and evaluated the patient with the resident and I agree with the assessment and plan as documented in the resident's note.    Mj Simon MD    I spent a total of 35 min face to face with Gabriella Villarreal during today's office visit. About 50% of the time was spent counseling the patient and/or coordinating care regarding their allergy.      Pictures were placed in Pt's chart today for future reference.                                  Mj Simon MD

## 2018-11-23 NOTE — MR AVS SNAPSHOT
After Visit Summary   11/23/2018    Gabriella Villarreal    MRN: [id number]           Patient Information     Date Of Birth          1965        Visit Information        Provider Department      11/23/2018 9:00 AM Mj Simon MD [facility]        Care Instructions    RESULTS & EVALUATION of PATCH TESTS    Patch test readings after     [x] 2 days, [] 3 days [x] 4 days, [] 5 days,    Applied patch tests with results (import here the list of patch tests):  Date/time of application:11/19/18  Physician/Nurse:  / Candace Gupta LPN               Localization of application: Back, in the moment without eczematous lesions    STANDARD Series         No Substance 2 days 4 days remarks   1 Clement Mix [C] - -    2 Colophony - -    3  2-Mercaptobenzothiazole  - -     4 Methylisothiazolinone - -    5 Carba Mix - -    6 Thiuram Mix [A] - -    7 Bisphenol A Epoxy Resin - -    8 B-Xcdz-Jandmkoueyt-Formaldehyde Resin - -    9 Mercapto Mix [A] - -    10 Black Rubber Mix- PPD [B] - -    11 Potassium Dichromate  -  -    12 Balsam of [place] (Myroxylon Pereirae Resin) - -    13 Nickel Sulphate Hexahydrate - -    14 Mixed Dialkyl Thiourea - -    15 Paraben Mix [B] - -    16 Methyldibromo Glutaronitrile - -    17 Fragrance Mix - -    18 2-Bromo-2-Nitropropane-1,3-Diol (Bronopol) - -    19 Lyral - -    20 Tixocortol-21- Pivalate - -    21 Diazolidiyl Urea (Germall II) - -    22 Methyl Methacrylate - -    23 Cobalt (II) Chloride Hexahydrate - -    24 Fragrance Mix II  - -    25 Compositae Mix - -    26 Benzoyl Peroxide (+) ++    27 Bacitracin - -    28 Formaldehyde - -    29 Methylchloroisothiazolinone / Methylisothiazolinone - -    30 Corticosteroid Mix - -    31 Sodium Lauryl Sulfate - -    32 Lanolin Alcohol - -    33 Turpentine - -    34 Cetylstearylalcohol - -    35 Chlorhexidine Dicluconate - -    36 Budenoside - -    37 Imidazolidinyl Urea  - -    38 Ethyl-2 Cyanoacrylate - +/++    39  Quaternium 15 (Dowicil 200) - -    40 Decyl Glucoside - -      PLASTICS         No Substance 2 days 4 days remarks    Acrylates - -    41 2-Hydroxyethyl Methacrylate (HEMA) - -    42 1,4-Butandioldimethacrylate (BUDMA) - -    43  2-Ethylhexyl Acrylate - -    44 Bisphenol-A-Dimethacrylate  - -    45 Diurethane-Dimethacrylate - -    46 Ethyleneglycoldimethacrylate (EGDMA) - -    47 Pentaerythritoltriacrylate (MIRANDA) - -    48 Triethylene Glycol Dimethacrylate (TEGDMA) - -     Synthetic material/additives       49 H-Drak-Zicdbfywady - -    50 Tricresyl Phosphate - -    51 4-Itkb-Qdxrualnvjocq - -    52 Bis (2-Ethylhexyl) Phthalate - -    53 Dibutylphthalate - -    54 Dimethylphthalate - -    55 Toluene-2,4-Diisocyanate - -    56 Diphenylmethane-4,4''-Diisocyanate - -     EPOXY RESIN SYSTEMS       Reactive Solvents - -    57 Cresyl Glycidyl Ether - -    58 Butyl Glycidyl Ether - -    59 Phenyl Glycidyl Ether - -    60 1,4-Butanediol Diglycidyl Ether - -    61 1,6-Hexanediole Diglycidyl Ether - -     Hardener / Accelerator - -    62 Ethylenediamine Dihydrochloride - -    63 Triethylenetetramine - -    64 Diethylenetriamine - -    65 Isophorone Diamine (IPD) - -    66 N,N-Dimethyl-P-Toluidine - -      ANTIBIOTICS & ANTIMYCOTICS         No Substance 2 days 4 days remarks   67 Erythromycine - -    68 Framycetine Sulphate - -    69 Fusidic Acid Sodium Salt - -    70 Gentamicin Sulphate - -    71 Neomycine Sulphate - -    72 Oxytetracycline  - -    73 Polymyxin B Sulphate - -    74 Tetracycline-HCL - -    75 Sulfanilamide - -    76 Metronidazole - -    77 Oxyquinoline Mix (+) -    78 Nitrofurazone - -    79 Nystatin - -    80 Clotrimazole - -      PRESERVATIVES & ANTIMICROBIALS         No Substance 2 days 4 days remarks   81  1,2-Benzisothiazoline-3-One, Sodium Salt - +    82  1,3,5-Osmin (2-Hydroxyethyl) - Hexahydrotriazine (Grotan BK) - -    83 1-Uewqupuzxutom-3-Nitro-1, 3-Propanediol - -    84  3, 4, 4' - Triclocarban - -     85 4 - Chloro - 3 - Cresol - -    86 4 - Chloro - 4 - Xylenol (PCMX) - -    87 7-Ethylbicyclooxazolidine (Bioban TX4102) - -    88 Benzalkonium Chloride - -    89 Benzyl Alcohol - -    90 Cetalkonium Chloride - -    91 Cetylpyrimidine Chloride  - +    92 Chloroacetamide - -    93 DMDM Hydantoin - -    94 Glutaraldehyde - -    95 Triclosan - -    96 Glyoxal Trimeric Dihydrate - -    97 Iodopropynyl Butylcarbamate - -    98 Octylisothiazoline ++ ++    99 Iodoform - -    100 (Nitrobutyl) Morpholine/(Ethylnitro-Trimethylene) Dimorpholine ([place]an P 1487) - -    101 Phenoxyethanol - -    102 Phenyl Salicylate - -    103 Povidone Iodine - -    104 Sodium Benzoate - -    105 Sodium Disulfite - -    106 Sorbic Acid - -    107 Thimerosal - -     Parabens      108 Butyl-P-Hydroxybenzoate - -    109 Ethyl-P-Hydroxybenzoate - -    110 Methyl-P-Hydroxybenzoate - -    111 Propyl-P-Hydroxybenzoate - -      Results of patch tests:                         Interpretation:    - Negative                    A    = Allergic      (+) Erythema    TI   = Toxic/irritant   + E + Infiltration    RaP = Relevance at Present     ++ E/I + Papulovesicle   Rpr  = Relevance Previously     +++ E/I/P + Blister     nR   = No Relevance    [] No relevant allergic reaction observed    [x] Allergic reaction diagnosed against: Octylisothiazolinone      Interpretation/ remarks:   Most probably the reaction on the wounds on breast areas were due to the cyanoacrylates present in dermaglue. Not excluded an additional contact allergy in preservatives in acrylates such as Octylisothiazolinone and Benzisothiazolinone and maybe Benzoylperoxide as initiator of polymerisation.   The reaction to Cetylpyrimidium is maybe not very relevant.    [] Patient information given   [] ACSD information   [x] SmartPractice information: Octylisothiazolinone, Benzoylperoxide,  Benzisothiazolinone, Cetylpyridium chloride, Ethyl-2 Cyanoacrylate      ==> final Diagnosis:    -->  Allergic Contact dermatitis with relevant contact sensitizations to:    Ethyl-2 Cyanoacrylate = main component in the sutureless dermabond acrylate    Octylisothiazolinone and Benzisothiazolinone = possibly present as preservatives in the Dermabond    Benzoylperoxide = in acne medications and as initiator/catalyst of polymerisation in acrylates    Cetylpryridinium chloride = disinfectant, maybe present in the wipes    ==> Treatment prescribed/Plan:    Start Gentian violet BID when able to get it    Continue triamcinolone 0.1% BID for 3-4 more days    --> avoid in future Dermabond and similar acrylates during surgery (sutureless surgergy) and use for disinfection pure Chlorhexidine, PVP Iodine or Alcohol.   --> keep in mind that Benzoylperoxide can be used as well in bone cements for implants      These conclusions are made at the best of ones knowledge and belief  based on the provided evidence such as patients history and allergy test results and they can change over time or can be incomplete because of missing informations.          Follow-ups after your visit        Your next 10 appointments already scheduled     Feb 08, 2019  1:15 PM CST   (Arrive by 1:00 PM)   Post-Op with Kalina Oviedo MD   [facility] ([facility])    [address] [place] [address]  [address] [place] [id number] [phone]              Who to contact     Please call your clinic at [phone] to:    Ask questions about your health    Make or cancel appointments    Discuss your medicines    Learn about your test results    Speak to your doctor            Additional Information About Your Visit        [employer] Information     Know[employer] gives you secure access to your electronic health record. If you see a primary care provider, you can also send messages to your care team and make appointments. If you have questions, please call your primary care clinic.  If you do not have a primary care provider,  please call [phone] and they will assist you.      iiko is an electronic gateway that provides easy, online access to your medical records. With iiko, you can request a clinic appointment, read your test results, renew a prescription or communicate with your care team.     To access your existing account, please contact your [facility] or call [phone] for assistance.        Care EveryWhere ID     This is your Care EveryWhere ID. This could be used by other organizations to access your [facility] medical records  [id number]        Your Vitals Were     Last Period                   10/26/2018 (Exact Date)            Blood Pressure from Last 3 Encounters:   11/19/18 124/84   11/06/18 123/78   10/27/18 129/84    Weight from Last 3 Encounters:   11/19/18 82.5 kg (181 lb 14.4 oz)   11/06/18 80 kg (176 lb 6 oz)   10/27/18 81.4 kg (179 lb 8 oz)              Today, you had the following     No orders found for display       Primary Care Provider Office Phone # Fax #    Annamarium Urrutia [phone]       [facility] [address] [facility] [address]        Equal Access to Services     LUIGI BIRD AH: Hadii aad jiemna hadasho Soomaali, waaxda luqadaha, qaybta kaalmada adeegyada, clyde gauthier. So [facility] [phone].    ATENCIÓN: Si habla español, tiene a luna disposición servicios gratuitos de asistencia lingüística. Llame al [phone].    We comply with applicable federal civil rights laws and [place] laws. We do not discriminate on the basis of race, color, national origin, age, disability, sex, sexual orientation, or gender identity.            Thank you!     Thank you for choosing [facility] DER[facility]  for your care. Our goal is always to provide you with excellent care. Hearing back from our patients is one way we can continue to improve our services. Please take a few minutes to complete the written survey that you may receive  in the mail after your visit with us. Thank you!             Your Updated Medication List - Protect others around you: Learn how to safely use, store and throw away your medicines at www.disposemymeds.org.          This list is accurate as of 11/23/18 10:20 AM.  Always use your most recent med list.                   Brand Name Dispense Instructions for use Diagnosis    ACETAMINOPHEN PO      Take 1,000 mg by mouth        albuterol 108 (90 Base) MCG/ACT inhaler    PROAIR HFA/PROVENTIL HFA/VENTOLIN HFA     Inhale 2-4 puffs into the lungs        cephALEXin 500 MG capsule    KEFLEX    28 capsule    Take 1 capsule (500 mg) by mouth 4 times daily    Malignant neoplasm of upper-outer quadrant of right breast in female, estrogen receptor positive (H)       chlorpheniramine 4 MG tablet    CHLOR-TRIMETON     Take 4 mg by mouth        diphenhydrAMINE 25 MG tablet    BENADRYL          emollient cream     57 g    Apply topically as needed for other (for dry skin)    Allergic contact dermatitis, unspecified trigger       fluticasone 50 MCG/ACT spray    FLONASE     INSTILL 2 SPRAYS INTO BOTH NOSTRILS DAILY.        * gentian violet 2 % solution     59 mL    Apply 1 mL topically 2 times daily For affected area 1 - 2 times a day. Apply to area before applying triamcinolone cream    Allergic contact dermatitis, unspecified trigger       * gentian violet 1 % solution     59 mL    Apply 0.5 mLs topically 3 times daily    Allergic contact dermatitis due to other agents       ibuprofen 200 MG tablet    ADVIL/MOTRIN     Take 200 mg by mouth        levothyroxine 100 MCG tablet    SYNTHROID/LEVOTHROID     Take 100 mcg by mouth every morning        oxyCODONE 5 MG tablet    ROXICODONE    6 tablet    Take 1-2 tablets (5-10 mg) by mouth every 4 hours as needed for moderate to severe pain    Malignant neoplasm of lower-outer quadrant of right breast of female, estrogen receptor positive (H)       ranitidine 150 MG tablet    ZANTAC    60 tablet     Take 150 mg by mouth 2 times daily        senna-docusate 8.6-50 MG per tablet    SENOKOT-S;PERICOLACE    30 tablet    Take 1-2 tablets by mouth 2 times daily    Malignant neoplasm of lower-outer quadrant of right breast of female, estrogen receptor positive (H)       triamcinolone 0.1 % cream    KENALOG    80 g    Apply topically 2 times daily    Allergic contact dermatitis, unspecified trigger       * Notice:  This list has 2 medication(s) that are the same as other medications prescribed for you. Read the directions carefully, and ask your doctor or other care provider to review them with you.

## 2018-11-23 NOTE — NURSING NOTE
Allergy Rooming Note    Gabriella Villarreal's goals for this visit include:   Chief Complaint   Patient presents with     Allergies     Lauren is here for patch testing day 5     Candace Gupta LPN

## 2018-11-23 NOTE — PATIENT INSTRUCTIONS
RESULTS & EVALUATION of PATCH TESTS    Patch test readings after     [x] 2 days, [] 3 days [x] 4 days, [] 5 days,    Applied patch tests with results (import here the list of patch tests):  Date/time of application:11/19/18  Physician/Nurse:  / Candace Gupta LPN               Localization of application: Back, in the moment without eczematous lesions    STANDARD Series         No Substance 2 days 4 days remarks   1 Clement Mix [C] - -    2 Colophony - -    3  2-Mercaptobenzothiazole  - -     4 Methylisothiazolinone - -    5 Carba Mix - -    6 Thiuram Mix [A] - -    7 Bisphenol A Epoxy Resin - -    8 C-Wsai-Kuqbrliqhah-Formaldehyde Resin - -    9 Mercapto Mix [A] - -    10 Black Rubber Mix- PPD [B] - -    11 Potassium Dichromate  -  -    12 Balsam of [place] (Myroxylon Pereirae Resin) - -    13 Nickel Sulphate Hexahydrate - -    14 Mixed Dialkyl Thiourea - -    15 Paraben Mix [B] - -    16 Methyldibromo Glutaronitrile - -    17 Fragrance Mix - -    18 2-Bromo-2-Nitropropane-1,3-Diol (Bronopol) - -    19 Lyral - -    20 Tixocortol-21- Pivalate - -    21 Diazolidiyl Urea (Germall II) - -    22 Methyl Methacrylate - -    23 Cobalt (II) Chloride Hexahydrate - -    24 Fragrance Mix II  - -    25 Compositae Mix - -    26 Benzoyl Peroxide (+) ++    27 Bacitracin - -    28 Formaldehyde - -    29 Methylchloroisothiazolinone / Methylisothiazolinone - -    30 Corticosteroid Mix - -    31 Sodium Lauryl Sulfate - -    32 Lanolin Alcohol - -    33 Turpentine - -    34 Cetylstearylalcohol - -    35 Chlorhexidine Dicluconate - -    36 Budenoside - -    37 Imidazolidinyl Urea  - -    38 Ethyl-2 Cyanoacrylate - +/++    39 Quaternium 15 (Dowicil 200) - -    40 Decyl Glucoside - -      PLASTICS         No Substance 2 days 4 days remarks    Acrylates - -    41 2-Hydroxyethyl Methacrylate (HEMA) - -    42 1,4-Butandioldimethacrylate (BUDMA) - -    43  2-Ethylhexyl Acrylate - -    44 Bisphenol-A-Dimethacrylate  - -    45  Diurethane-Dimethacrylate - -    46 Ethyleneglycoldimethacrylate (EGDMA) - -    47 Pentaerythritoltriacrylate (MIRANDA) - -    48 Triethylene Glycol Dimethacrylate (TEGDMA) - -     Synthetic material/additives       49 B-Nbbu-Urpnomjfsqb - -    50 Tricresyl Phosphate - -    51 0-Jntt-Eqcmsbehzifbu - -    52 Bis (2-Ethylhexyl) Phthalate - -    53 Dibutylphthalate - -    54 Dimethylphthalate - -    55 Toluene-2,4-Diisocyanate - -    56 Diphenylmethane-4,4''-Diisocyanate - -     EPOXY RESIN SYSTEMS       Reactive Solvents - -    57 Cresyl Glycidyl Ether - -    58 Butyl Glycidyl Ether - -    59 Phenyl Glycidyl Ether - -    60 1,4-Butanediol Diglycidyl Ether - -    61 1,6-Hexanediole Diglycidyl Ether - -     Hardener / Accelerator - -    62 Ethylenediamine Dihydrochloride - -    63 Triethylenetetramine - -    64 Diethylenetriamine - -    65 Isophorone Diamine (IPD) - -    66 N,N-Dimethyl-P-Toluidine - -      ANTIBIOTICS & ANTIMYCOTICS         No Substance 2 days 4 days remarks   67 Erythromycine - -    68 Framycetine Sulphate - -    69 Fusidic Acid Sodium Salt - -    70 Gentamicin Sulphate - -    71 Neomycine Sulphate - -    72 Oxytetracycline  - -    73 Polymyxin B Sulphate - -    74 Tetracycline-HCL - -    75 Sulfanilamide - -    76 Metronidazole - -    77 Oxyquinoline Mix (+) -    78 Nitrofurazone - -    79 Nystatin - -    80 Clotrimazole - -      PRESERVATIVES & ANTIMICROBIALS         No Substance 2 days 4 days remarks   81  1,2-Benzisothiazoline-3-One, Sodium Salt - +    82  1,3,5-Osmin (2-Hydroxyethyl) - Hexahydrotriazine (Grotan BK) - -    83 5-Jxgigvqmfkhef-5-Nitro-1, 3-Propanediol - -    84  3, 4, 4' - Triclocarban - -    85 4 - Chloro - 3 - Cresol - -    86 4 - Chloro - 4 - Xylenol (PCMX) - -    87 7-Ethylbicyclooxazolidine (Bioban JI4321) - -    88 Benzalkonium Chloride - -    89 Benzyl Alcohol - -    90 Cetalkonium Chloride - -    91 Cetylpyrimidine Chloride  - +    92 Chloroacetamide - -    93 DMDM Hydantoin - -     94 Glutaraldehyde - -    95 Triclosan - -    96 Glyoxal Trimeric Dihydrate - -    97 Iodopropynyl Butylcarbamate - -    98 Octylisothiazoline ++ ++    99 Iodoform - -    100 (Nitrobutyl) Morpholine/(Ethylnitro-Trimethylene) Dimorpholine (Bioban P 1487) - -    101 Phenoxyethanol - -    102 Phenyl Salicylate - -    103 Povidone Iodine - -    104 Sodium Benzoate - -    105 Sodium Disulfite - -    106 Sorbic Acid - -    107 Thimerosal - -     Parabens      108 Butyl-P-Hydroxybenzoate - -    109 Ethyl-P-Hydroxybenzoate - -    110 Methyl-P-Hydroxybenzoate - -    111 Propyl-P-Hydroxybenzoate - -      Results of patch tests:                         Interpretation:    - Negative                    A    = Allergic      (+) Erythema    TI   = Toxic/irritant   + E + Infiltration    RaP = Relevance at Present     ++ E/I + Papulovesicle   Rpr  = Relevance Previously     +++ E/I/P + Blister     nR   = No Relevance    [] No relevant allergic reaction observed    [x] Allergic reaction diagnosed against: Octylisothiazolinone      Interpretation/ remarks:   Most probably the reaction on the wounds on breast areas were due to the cyanoacrylates present in dermaglue. Not excluded an additional contact allergy in preservatives in acrylates such as Octylisothiazolinone and Benzisothiazolinone and maybe Benzoylperoxide as initiator of polymerisation.   The reaction to Cetylpyrimidium is maybe not very relevant.    [] Patient information given   [] ACSD information   [x] SmartPractice information: Octylisothiazolinone, Benzoylperoxide,  Benzisothiazolinone, Cetylpyridium chloride, Ethyl-2 Cyanoacrylate      ==> final Diagnosis:    --> Allergic Contact dermatitis with relevant contact sensitizations to:    Ethyl-2 Cyanoacrylate = main component in the sutureless dermabond acrylate    Octylisothiazolinone and Benzisothiazolinone = possibly present as preservatives in the Dermabond    Benzoylperoxide = in acne medications and as  initiator/catalyst of polymerisation in acrylates    Cetylpryridinium chloride = disinfectant, maybe present in the wipes    ==> Treatment prescribed/Plan:    Start Gentian violet BID when able to get it    Continue triamcinolone 0.1% BID for 3-4 more days    --> avoid in future Dermabond and similar acrylates during surgery (sutureless surgergy) and use for disinfection pure Chlorhexidine, PVP Iodine or Alcohol.   --> keep in mind that Benzoylperoxide can be used as well in bone cements for implants      These conclusions are made at the best of ones knowledge and belief  based on the provided evidence such as patients history and allergy test results and they can change over time or can be incomplete because of missing informations.

## 2018-11-23 NOTE — LETTER
November 23, 2018      Gabriella Villarreal  [address] [facility] [address]              To whom it may concern,    Thank you for referring your patient, Gabriella Villarreal, for allergy testing. They were seen on 11/19/18, 11/21/19, and 11/23/19 for patch testing. The below compounds were tested. Please see below for my interpretation and a list of tests preformed.       Final Diagnosis  --> Allergic Contact dermatitis with relevant contact sensitizations to:    Ethyl-2 Cyanoacrylate = main component in the sutureless dermabond acrylate    Octylisothiazolinone and Benzisothiazolinone = possibly present as preservatives in the Dermabond    Benzoylperoxide = in acne medications and as initiator/catalyst of polymerisation in acrylates    Cetylpryridinium chloride = disinfectant, maybe present in the wipes       Treatment/Plan    Start Gentian violet BID when able to get it    Continue triamcinolone 0.1% BID for 3-4 more days  --> avoid in future Dermabond and similar acrylates during surgery and use for disinfection pure Chlorhexidine, PVP Iodine or Alcohol.     Positive Reactions:  Octylisothiazolinone  Most probably the reaction on the wounds on breast areas were due to the cyanoacrylates present in dermaglue. Not excluded an additional contact allergy in preservatives in acrylates such as Octylisothiazolinone and Benzisothiazolinone and maybe Benzoylperoxide as initiator of polymerisation.   The reaction to Cetylpyrimidium is maybe not very relevant.    Tested Products  (Standard) Clement Mix [C], Colophony, 2-Mercaptobenzothiazole, Methylisothiazolinone, Carba Mix, Thiuram Mix [A], Bisphenol A Epoxy Resin, J-Alwo-Gdkewaeznoo Formaldehyde Resin, Mercapto Mix [A], Black Rubber Mix- PPD [B], Potassium Dichromate, Balsam of [place] (Myroxylon Pereirae Resin), Nickel Sulphate Hexahydrate, Mixed Dialkyl Thiourea, Paraben Mix [B], Methyldibromo Glutaro-Nitrile, Fragrance mix,  2-Bromo-2-nitropropane-1,3-diol  (Bronopol), Lyral, Tixocortol-21-Pivalate, Diazolidiyl Urea (Germall II), Methyl Methacrylate, Cobalt (II) Chloride Hexahydrate, Fragrance Mix II, Compositae Mix, Benzoyl Peroxide, Bacitracin, Formaldehyde, Methylchloroisothiazolinone / Methylisothiazolinone, Corticoseteroid Mix, Sodium Lauryl Sulfate, Lanolin Alcohol, Oil of Turpentine, Cetylstearylalcohol, Chlorhexidine Dicluconate, Budenoside, Imidazolidinyl Urea, Ethyl Cyanoacrylate, Quaternium 15, Decyl Glucoside  (Preservatives and Antimicrobial) 1,2-benzisothiazoline-3-one, Sodium Salt, 1,3,5-Osmin(2-Hydroxyethyl) -Hexahydrotriazine(Grotan BK), 9-Xgbmlvbifmcdn-1-Nitro-1,3-Propanediol, 3,4,4'-Triclocarban, 4-Chloro-3- Cresol, 4-Chloro-3-5-Xylénol (PCMX), 7-Ethylbicyclooxazolidine (BioBan CS 1246), Benzalkonium Chloride, Benzyl Alcohol, Cetalkonium Chloride, Cetylpyrimidine Chloride, Chloroacetamide, DMDM Hydantoin, Glutaraldehyde, Triclosan, Glyoxal Trimeric Dihydrate, Iodopropynyl Butylcarbamate, Octylisothiazolinone, Iodoform, (Nitrobutyl) Morpholine/ (Ethylnitrotrimethylene) dimorpholine(Biopan ), Phenoxyethanol, Phenyl Salicylate, Povidone Iodine, Sodium Benzoate, Sodium Disulfite, Sorbic Acid, Thimerosal, Butyl-P-Hydroxybenzoate, Ethyl-P-Hydroxybenzoate, Methyl-P-Hydroxybenzoate, Propyl-P-Hydroxybenzoate  (Plastic) 2-Hydroxyethyl Methacrylate (HEMA), 1,4-butandioldimethacrylate (BUDMA), 2-Ethylhexyl Acrylate, Bisphenol-A-Dimethacrylate, Diurethane - Dimethacrylate, Ethyleneglycoldimethacrylate (EGDMA), Pentaerythritoltriacrylate (MIRANDA), Triethyleneglycol Dimethacrylate (TEGDMA), O-Blog-Pkbimyimvez, Tricresyl Phosphate, 9-Yeji-Zqwyyqezaqtbk, Bis (2-Ethylhexyl) Phthalate, Dibutylphthalate, Dimethylphthalate, Toluene-2,4-Diisocyanate, Diphenylmethane-4,4''-Diisocyanate, Cresyl Glycidyl Ether, Butyl Glycidyl Ether, Phenyl Glycidyl Ether, 1,4-Butanediol Diglycidyl Ether, 1,6-Hexanediole Diglycidyl Ether, Ethylenediamine Dihydrochloride,  Triethylenetetramine, Diethylenetriamine, Isophorone Diamine (IPD), N,N-Dimethyl-P-Toluidine  (Antibiotics and Antimycotics) Erythromycine, Framycetine Sulphate, Fusidic Acid Sodium Salt, Gentamicin Sulphate, Neomycine Sulphate, Oxytetracycline, Polymyxin B Sulphate, Tetracycline-HCL, Sulfanilamide, Metronidazole, Oxyquinoline Mix, Nitrofurazone, Nystatin, Clotrimazole            If you have any questions please call [phone]        Sincerely,    Mj Simon MD

## 2019-01-21 ENCOUNTER — TELEPHONE (OUTPATIENT)
Dept: ONCOLOGY | Facility: CLINIC | Age: 54
End: 2019-01-21

## 2019-01-21 NOTE — TELEPHONE ENCOUNTER
----- Message from Robert Muro RN sent at 1/21/2019 10:38 AM CST -----  Regarding: [facility] RN  Contact: [phone]  Received call from Elif TORRE with [facility], ivon they are following with pt while she is receiving treatment. Above is her contact number.    Thank You,    Debra Muro  [facility] RN  [phone]

## 2019-01-28 ENCOUNTER — ONCOLOGY VISIT (OUTPATIENT)
Dept: ONCOLOGY | Facility: CLINIC | Age: 54
End: 2019-01-28
Attending: INTERNAL MEDICINE
Payer: COMMERCIAL

## 2019-01-28 VITALS
WEIGHT: 182.1 LBS | HEART RATE: 88 BPM | TEMPERATURE: 97.9 F | SYSTOLIC BLOOD PRESSURE: 124 MMHG | RESPIRATION RATE: 14 BRPM | OXYGEN SATURATION: 100 % | HEIGHT: 64 IN | BODY MASS INDEX: 31.09 KG/M2 | DIASTOLIC BLOOD PRESSURE: 83 MMHG

## 2019-01-28 DIAGNOSIS — C50.411 MALIGNANT NEOPLASM OF UPPER-OUTER QUADRANT OF RIGHT BREAST IN FEMALE, ESTROGEN RECEPTOR POSITIVE (H): Primary | ICD-10-CM

## 2019-01-28 DIAGNOSIS — Z17.0 MALIGNANT NEOPLASM OF UPPER-OUTER QUADRANT OF RIGHT BREAST IN FEMALE, ESTROGEN RECEPTOR POSITIVE (H): Primary | ICD-10-CM

## 2019-01-28 PROCEDURE — 99214 OFFICE O/P EST MOD 30 MIN: CPT | Mod: GC | Performed by: INTERNAL MEDICINE

## 2019-01-28 PROCEDURE — G0463 HOSPITAL OUTPT CLINIC VISIT: HCPCS | Mod: ZF

## 2019-01-28 RX ORDER — TAMOXIFEN CITRATE 20 MG/1
20 TABLET ORAL DAILY
Qty: 90 TABLET | Refills: 3 | Status: SHIPPED | OUTPATIENT
Start: 2019-01-28 | End: 2020-01-17

## 2019-01-28 ASSESSMENT — PAIN SCALES - GENERAL: PAINLEVEL: NO PAIN (0)

## 2019-01-28 ASSESSMENT — MIFFLIN-ST. JEOR: SCORE: 1426.25

## 2019-01-28 NOTE — PROGRESS NOTES
Oncology Visit  Date on this visit: 11/19/2018    Gabriella Villarreal  is referred by Dr.Jane Lyric Oviedo for an oncology consultation. She requires evaluation for new diagnosis of invasive mammary carcinoma of the right breast, s/p lumpectomy.     History Of Present Illness:  Ms. Villarreal is a 53 year old female who presents with new diagnosis of invasive mammary carcinoma involving the right breast. She underwent right sided wire-localized segmental mastectomy and sentinel lymph node biopsy on 10/24/18 with Dr. Kalina Oviedo. She was seen in follow-up in the post-operative period and has been noted to be healing well. Her course was complicated by a rash on the right breast and axilla that was pruritic and erythematous. There was some weeping as well. It eventually spread up into the right neck and down into the upper abdomen as well. She was seen by dermatology on 10/31/18 and was diagnosed with acute contact dermatitis. She was treated with steroids and the rash has significantly improved. She has a follow-up appointment with dermatology later today, for consideration of patch testing. Her OncoType score was 13 and thus no adjuvant chemotherapy was recommended. She completed adjuvant radiation therapy at [facility] with Dr. Blevins and is here today to discuss adjuvant endocrine therapy.     Lauren reports that overall she has been feeling very well.  She had some mild redness in the breast tissue after radiation however this has resolved completely.  She denies any pain, swelling, tenderness, range of motion issues on her right side.  No new abnormalities on the left.  She denies fevers chills sweats.  She did see her family practice practitioner on January 21, complaining of some right lower quadrant abdominal pain that was sharp and severe in nature.  They discussed getting a CT versus ultrasound as she has a history of ovarian cysts, but ultimately decided to wait a couple more days.  She reports that a day  "later she experienced a bout of diarrhea overnight with about 5 stools and the next day started her menstrual period.  The pain completely resolved at that time and has not returned.  She has had normal bowel movements since.    Past Medical/Surgical History:  Past Medical History:   Diagnosis Date     Allergic rhinitis      Chronic cough      GERD (gastroesophageal reflux disease)      Hypothyroidism      Past Surgical History:   Procedure Laterality Date     CHOLECYSTECTOMY       L salpingooophorectomy  2015     LUMPECTOMY BREAST WITH SENTINEL NODE, COMBINED Right 10/24/2018    Procedure: Right Wire Localized Segmental Mastectomy (Lumpectomy), Right [place] Lymph Node Biopsy;  Surgeon: Kalina Oviedo MD;  Location: [facility]     Menstrual History:   Menarche around age 15.  Three pregnancies, uncomplicated.   the first two children.  Was not on OCPs until her mid-40s, at which time she started \"low-dose\" combined OCPs for painful periods.  Still having relatively regular monthly cycles, some just spotting, others \"normal periods\".    Allergies:  Allergies as of 01/28/2019 - Reviewed 11/23/2018   Allergen Reaction Noted     Seasonal allergies  10/24/2018     Benzoyl peroxide Rash 11/30/2018     Mecrylate Rash 11/30/2018     Other  [no clinical screening - see comments] Rash 11/30/2018     Current Medications:  Current Outpatient Medications   Medication Sig Dispense Refill     ACETAMINOPHEN PO Take 1,000 mg by mouth       albuterol (PROAIR HFA/PROVENTIL HFA/VENTOLIN HFA) 108 (90 Base) MCG/ACT inhaler Inhale 2-4 puffs into the lungs       chlorpheniramine (CHLOR-TRIMETON) 4 MG tablet Take 4 mg by mouth        diphenhydrAMINE (BENADRYL) 25 MG tablet        emollient (VANICREAM) cream Apply topically as needed for other (for dry skin) 57 g 0     fluticasone (FLONASE) 50 MCG/ACT spray INSTILL 2 SPRAYS INTO BOTH NOSTRILS DAILY.       ibuprofen (ADVIL/MOTRIN) 200 MG tablet Take 200 mg by mouth       " "levothyroxine (SYNTHROID/LEVOTHROID) 100 MCG tablet Take 100 mcg by mouth every morning        ranitidine (ZANTAC) 150 MG tablet Take 150 mg by mouth 2 times daily  60 tablet 1     cephALEXin (KEFLEX) 500 MG capsule Take 1 capsule (500 mg) by mouth 4 times daily (Patient not taking: Reported on 11/19/2018) 28 capsule 0     gentian violet 1 % solution Apply 0.5 mLs topically 3 times daily (Patient not taking: Reported on 11/6/2018) 59 mL 1     gentian violet 2 % solution Apply 1 mL topically 2 times daily For affected area 1 - 2 times a day. Apply to area before applying triamcinolone cream (Patient not taking: Reported on 11/6/2018) 59 mL 3     oxyCODONE IR (ROXICODONE) 5 MG tablet Take 1-2 tablets (5-10 mg) by mouth every 4 hours as needed for moderate to severe pain (Patient not taking: Reported on 11/6/2018) 6 tablet 0     senna-docusate (SENOKOT-S;PERICOLACE) 8.6-50 MG per tablet Take 1-2 tablets by mouth 2 times daily (Patient not taking: Reported on 11/6/2018) 30 tablet 0     triamcinolone (KENALOG) 0.1 % cream Apply topically 2 times daily (Patient not taking: Reported on 1/28/2019) 80 g 1       ROS: 10 point ROS neg other than the symptoms noted above in the HPI.      Physical Exam:  /83 (BP Location: Left arm, Patient Position: Chair, Cuff Size: Adult Regular)   Pulse 88   Temp 97.9  F (36.6  C) (Oral)   Resp 14   Ht 1.626 m (5' 4.02\")   Wt 82.6 kg (182 lb 1.6 oz)   SpO2 100%   BMI 31.24 kg/m     Gen: alert, pleasant and conversational, NAD  HEENT: NC/AT, EOMI w/ PERRL, anicteric sclera.  OP clear. MMM. Neck supple  Lymph: no palpable cervical, clavicular or axillary LAD.   CV: normal S1,S2 with RRR no m/r/g  Resp: lungs CTA bilaterally with adequate air movement to bases. No wheezes or crackles  Abd: soft NTND no organomegaly or masses. BS normoactive.   Ext: WWP no edema or cyanosis  Skin: no concerning lesions or rashes. Right axilla with well-healed incisional scar and mild skin color " changes.   Neuro: A&Ox4, no lateralizing sx. Grossly nonfocal.      Laboratory/Imaging Studies  Lab Results   Component Value Date    WBC 6.8 10/19/2018    HGB 13.3 10/19/2018    HCT 39.4 10/19/2018     10/19/2018     10/19/2018    BUN 10 10/19/2018    CO2 26 10/19/2018     SURGICAL PATHOLOGY, 10/24/18:  SPECIMEN(S):    A: Right breast mass    B: Right axillary sentinal lymph node #1    C: Right breast, new inferior margin   FINAL DIAGNOSIS:   A. Right breast, lower outer quadrant, wire localized segmental mastectomy:   - INVASIVE MAMMARY CARCINOMA OF NO SPECIAL TYPE (INVASIVE DUCTAL CARCINOMA), JAJA GRADE 1, two foci   - Focus of invasive carcinoma with prior core biopsy site: size 18 mm   - Focus of invasive carcinoma slightly anterior and superior to first focus: size 15 mm   - Ductal carcinoma in situ (DCIS), nuclear grade 2, solid, cribriform and papillary types, with lobular involvement and comedonecrosis   - DCIS involves an intraductal papilloma   - DCIS is admixed with and adjacent to both foci of invasive carcinoma, and spans an estimated 3.5 cm (extensive intraductal component)   - No lymphovascular invasion identified   - The inferior margin of this specimen is involved by invasive carcinoma and DCIS (not a final margin, see specimen C)   - Invasive carcinoma is 1 mm from the superior margin, 4 mm from the posterior margin, 5 mm from the medial margin and > 5 mm from the anterior and lateral margins   - DCIS is 2 mm from the superior margin, 3.5 mm from the medial margin, 5 mm from the posterior margin and > 5 mm from the anterior and lateral margins   - Flat epithelial atypia   - Other findings: columnar cell change, intraductal papillomas, fibrocystic change (including microcysts with apocrine metaplasia) and usual ductal hyperplasia   - Calcifications associated with DCIS   - Prior core biopsy site changes   - Larger focus of invasive carcinoma is estrogen receptor positive (95%,  strong intensity), progesterone receptor positive (98%, strong intensity) and reportedly HER2 equivocal (2+ per outside report) by immunohistochemistry and is reportedly HER2 non-amplified by FISH (HER2:CEN17 ratio 1.06 per outside report)   - HER2 FISH for the smaller focus of invasive carcinoma will be reported separately by cytogenetics   - See comment for tumor synoptic   - See microscopic description     B. Lymph node, right axillary, sentinel # 1, excision:   - One benign lymph node (0/1)     C. Right breast, new inferior margin, excision:   - Atypical ductal hyperplasia   - Fibrocystic change (including microcysts with apocrine metaplasia   - No residual invasive or in situ carcinoma     COMMENT:   Invasive carcinoma and DCIS are 10 mm from the final inferior margin, because the new inferior margin (specimen C) is 10 mm thick and is uninvolved by invasive carcinoma and DCIS.     ASSESSMENT/PLAN:  Gabriella Villarreal is a 53 year old woman with recently diagnosed Stage IA invasive mammary carcinoma involving the right breast, ER+/CO+ and Her2 non-amplified (pT1c(2)N0). She underwent wire-localized right-sided segmental mastectomy with Dr. Kalina Oviedo on 10/24/18. Her course was complicated by an intensely pruritic rash, which started in the right axilla and spread to the right breast, up to the neck and into the upper right abdomen. This was diagnosed as acute contact dermatitis and resolved with a course of steroids.     #  Breast cancer - ER+/CO+ and Her2 non-amplified (pT1c(2)N0). OncoTypeDX score is 13, indicating low-risk of distant recurrence and no additional benefit of chemotherapy. With her hormone-receptor positive disease, and perimenopausal status we recommend adjuvant endocrine therapy with Tamoxifen after radiation, which she completed on January 4th. We re-reviewed risks and benefits of adjuvant enodcrine therapy. Benefits include reducing the risk of tumor recurrence. Risk include hot flashes, night  sweats, mood changes, nausea. She agreed to proceed with Tamoxifen at 20mg. She will let us know how she is doing if she is having significant side effect.s She will need a baseline DEXA scan.     We will schedule her to RTC with HATTIE for survivorship visit and review DEXA in 3 months to check on her progress with Tamoxifen. Follow up with Dr. Berrios in 6 months w/ mammograms.      Pt seen and examined with Dr. Yash Hastings MD   Hematology / Oncology Fellow  P: [phone]    The patient was seen and evaluated by me with Dr. Oliver Hastings. Please see above note for complete consultation.      Lauren has completed breast radiation for her T1cN0, ER positive, MD positive, HER-2 negative right breast cancer.  She tolerated this well.  On clinical examination, she has mild erythema over her right axilla and under her right breast.  No warmth, no nodules or masses.      We reviewed with Lauren today that I would recommend adjuvant tamoxifen.  She remains perimenopausal.  We discussed using tamoxifen at 20 mg daily.  Side effects were discussed in detail.  Consent was obtained.      We will obtain a baseline DEXA scan.  She will return in 3 months for a Survivorship visit and 6 months with a mammogram.

## 2019-01-28 NOTE — NURSING NOTE
"Oncology Rooming Note    January 28, 2019 10:28 AM   Gabriella Villarreal is a 53 year old female who presents for:    Chief Complaint   Patient presents with     Oncology Clinic Visit     P RETURN- MALIGNANT NEOPLASM OF UPPER OUTER QUAD RIGHT BREAST     Initial Vitals: /83 (BP Location: Left arm, Patient Position: Chair, Cuff Size: Adult Regular)   Pulse 88   Temp 97.9  F (36.6  C) (Oral)   Resp 14   Ht 1.626 m (5' 4.02\")   Wt 82.6 kg (182 lb 1.6 oz)   SpO2 100%   BMI 31.24 kg/m   Estimated body mass index is 31.24 kg/m  as calculated from the following:    Height as of this encounter: 1.626 m (5' 4.02\").    Weight as of this encounter: 82.6 kg (182 lb 1.6 oz). Body surface area is 1.93 meters squared.  No Pain (0) Comment: Data Unavailable   No LMP recorded. Patient is perimenopausal.  Allergies reviewed: Yes  Medications reviewed: Yes    Medications: Medication refills not needed today.  Pharmacy name entered into [facility]:    [facility] [phone]  [facility] [address] [place] [address] [facility] AT [address] [address] [place] [address] [facility]    Clinical concerns: No new concerns. Yash was notified.    10 minutes for nursing intake (face to face time)     Aleksander Rosenberg LPN            "

## 2019-02-08 ENCOUNTER — OFFICE VISIT (OUTPATIENT)
Dept: ONCOLOGY | Facility: CLINIC | Age: 54
End: 2019-02-08
Attending: SURGERY
Payer: COMMERCIAL

## 2019-02-08 VITALS
SYSTOLIC BLOOD PRESSURE: 125 MMHG | BODY MASS INDEX: 31.04 KG/M2 | TEMPERATURE: 97.3 F | HEIGHT: 64 IN | RESPIRATION RATE: 14 BRPM | HEART RATE: 88 BPM | OXYGEN SATURATION: 100 % | DIASTOLIC BLOOD PRESSURE: 83 MMHG | WEIGHT: 181.8 LBS

## 2019-02-08 DIAGNOSIS — C50.411 MALIGNANT NEOPLASM OF UPPER-OUTER QUADRANT OF RIGHT BREAST IN FEMALE, ESTROGEN RECEPTOR POSITIVE (H): Primary | ICD-10-CM

## 2019-02-08 DIAGNOSIS — Z17.0 MALIGNANT NEOPLASM OF UPPER-OUTER QUADRANT OF RIGHT BREAST IN FEMALE, ESTROGEN RECEPTOR POSITIVE (H): Primary | ICD-10-CM

## 2019-02-08 PROCEDURE — G0463 HOSPITAL OUTPT CLINIC VISIT: HCPCS | Mod: ZF

## 2019-02-08 ASSESSMENT — PAIN SCALES - GENERAL: PAINLEVEL: NO PAIN (0)

## 2019-02-08 ASSESSMENT — MIFFLIN-ST. JEOR: SCORE: 1424.89

## 2019-02-08 NOTE — LETTER
"2019      RE: Gabriella Villarreal  [address]  [facility] [facility] [address]     2019    MIKAL ALCALA   [facility] [address]  [facility] [address]    RE: Gabriella Villarreal  (: 1965)    Dear Dr. Mikal CHAPIN Renan:    Your patient was seen for evaluation in my office.  Please find a copy of my notes for your record and review.  If you have any further questions, please feel free to contact my office.   Thank you for your kind referral.    ---     FOLLOW-UP  2019    Gabriella Villarreal is a 53 year old female who returns for follow-up for     Cancer Staging  Malignant neoplasm of upper-outer quadrant of right breast in female, estrogen receptor positive (H)  Staging form: Breast, AJCC 8th Edition  - Clinical: No stage assigned - Unsigned  - Pathologic: Stage IA (pT1c(2), pN0, cM0, G1, ER: Positive, NV: Positive, HER2: Negative) - Signed by Kalina Oviedo MD on 2018      Treatment to date:  1. Right wire-localized segmental mastectomy and sentinel lymph node biopsy (10/24/2018)  2. Oncotype DX 13  3. Adjuvant radiation therapy with Dr Blevins (2018 to 2018)  4. Adjuvant tamoxifen (2019 to ongoing)    HPI:    Since her last visit, she just started tamoxifen 4 days ago. She has been having some insomnia and spotting.  She will reach out to Dr Berrios to follow up. She tolerated radiation therapy well.    /83 (BP Location: Left arm, Patient Position: Chair, Cuff Size: Adult Regular)   Pulse 88   Temp 97.3  F (36.3  C) (Oral)   Resp 14   Ht 1.626 m (5' 4.02\")   Wt 82.5 kg (181 lb 12.8 oz)   SpO2 100%   BMI 31.19 kg/m      Physical Exam   Constitutional: She appears well-developed and well-nourished.   Pulmonary/Chest: Right breast exhibits no inverted nipple, no mass, no nipple discharge, no skin change and no tenderness. Left breast exhibits no inverted nipple, no mass, no nipple discharge, no skin change and no tenderness. Breasts are symmetrical. "   Patient was examined in both supine and upright positions. Breast incision unremarkable. No nipple-areolar complex distortion or contour abnormality.   Lymphadenopathy:     She has no cervical adenopathy.        Right cervical: No superficial cervical, no deep cervical and no posterior cervical adenopathy present.       Left cervical: No superficial cervical, no deep cervical and no posterior cervical adenopathy present.     She has no axillary adenopathy.        Right axillary: No pectoral and no lateral adenopathy present.        Left axillary: No pectoral and no lateral adenopathy present.       Right: No supraclavicular adenopathy present.        Left: No supraclavicular adenopathy present.   No lymphedema in bilateral upper extremities. Axillary incision unremarkable.   Skin: Skin is warm and dry.      ASSESSMENT:    Gabriella Villarreal is a 53 year old female with right breast cancer, 3 months s/p surgery.    She is doing well.  There is no clinical evidence of recurrence today.  We reviewed that breast cancer surveillance moving forward involves physical exam as well as annual mammography.  Her next mammogram is due approximately 6 months after completion of radiation (June 2019).  She will be following up with Dr Berrios as well.    I have not made specific follow-up appointments with me at this time and will defer ongoing care to Dr Berrios and to her PCP.  She knows to call if she has concerns.     Total time spent with the patient was 20 minutes, of which more than half was counseling.     PLAN:  1. Next mammogram due 6 months after completion of radiation (June 2019)  2. Follow up with Dr Yash Oviedo MD [facility]

## 2019-02-08 NOTE — LETTER
"2019      RE: Gabriella Villarreal  [address] [address] [facility] [address]     2019    RE: Gabriella Villarreal  (: 1965)    Dear Dr. Berrios    Your patient was seen for evaluation in my office.  Please find a copy of my notes for your record and review.  If you have any further questions, please feel free to contact my office.   Thank you.    ---       FOLLOW-UP  2019    Gabriella Villarreal is a 53 year old female who returns for follow-up for     Cancer Staging  Malignant neoplasm of upper-outer quadrant of right breast in female, estrogen receptor positive (H)  Staging form: Breast, AJCC 8th Edition  - Clinical: No stage assigned - Unsigned  - Pathologic: Stage IA (pT1c(2), pN0, cM0, G1, ER: Positive, ME: Positive, HER2: Negative) - Signed by Kalina Oviedo MD on 2018      Treatment to date:  1. Right wire-localized segmental mastectomy and sentinel lymph node biopsy (10/24/2018)  2. Oncotype DX 13  3. Adjuvant radiation therapy with Dr Blevins (2018 to 2018)  4. Adjuvant tamoxifen (2019 to ongoing)    HPI:    Since her last visit, she just started tamoxifen 4 days ago. She has been having some insomnia and spotting.  She will reach out to Dr Berrios to follow up. She tolerated radiation therapy well.    /83 (BP Location: Left arm, Patient Position: Chair, Cuff Size: Adult Regular)   Pulse 88   Temp 97.3  F (36.3  C) (Oral)   Resp 14   Ht 1.626 m (5' 4.02\")   Wt 82.5 kg (181 lb 12.8 oz)   SpO2 100%   BMI 31.19 kg/m      Physical Exam   Constitutional: She appears well-developed and well-nourished.   Pulmonary/Chest: Right breast exhibits no inverted nipple, no mass, no nipple discharge, no skin change and no tenderness. Left breast exhibits no inverted nipple, no mass, no nipple discharge, no skin change and no tenderness. Breasts are symmetrical.   Patient was examined in both supine and upright positions. Breast incision unremarkable. No nipple-areolar " complex distortion or contour abnormality.   Lymphadenopathy:     She has no cervical adenopathy.        Right cervical: No superficial cervical, no deep cervical and no posterior cervical adenopathy present.       Left cervical: No superficial cervical, no deep cervical and no posterior cervical adenopathy present.     She has no axillary adenopathy.        Right axillary: No pectoral and no lateral adenopathy present.        Left axillary: No pectoral and no lateral adenopathy present.       Right: No supraclavicular adenopathy present.        Left: No supraclavicular adenopathy present.   No lymphedema in bilateral upper extremities. Axillary incision unremarkable.   Skin: Skin is warm and dry.      ASSESSMENT:    Gabriella Villarreal is a 53 year old female with right breast cancer, 3 months s/p surgery.    She is doing well.  There is no clinical evidence of recurrence today.  We reviewed that breast cancer surveillance moving forward involves physical exam as well as annual mammography.  Her next mammogram is due approximately 6 months after completion of radiation (June 2019).  She will be following up with Dr Berrios as well.    I have not made specific follow-up appointments with me at this time and will defer ongoing care to Dr Berrios and to her PCP.  She knows to call if she has concerns.     Total time spent with the patient was 20 minutes, of which more than half was counseling.     PLAN:  1. Next mammogram due 6 months after completion of radiation (June 2019)  2. Follow up with Dr Yash Oviedo MD [facility]

## 2019-02-08 NOTE — Clinical Note
"2/8/2019       RE: Gabriella Villarreal  [address] [facility] [address]     Dear Colleague,    Thank you for referring your patient, Gabriella Villarreal, to the [facility] at [facility]. Please see a copy of my visit note below.    FOLLOW-UP  Feb 8, 2019    Gabriella Villarreal is a 53 year old female who returns for follow-up for     Cancer Staging  Malignant neoplasm of upper-outer quadrant of right breast in female, estrogen receptor positive (H)  Staging form: Breast, AJCC 8th Edition  - Clinical: No stage assigned - Unsigned  - Pathologic: Stage IA (pT1c(2), pN0, cM0, G1, ER: Positive, ID: Positive, HER2: Negative) - Signed by Kalina Oviedo MD on 11/6/2018      Treatment to date:  1. Right wire-localized segmental mastectomy and sentinel lymph node biopsy (10/24/2018)  2. Oncotype DX 13  3. Adjuvant radiation therapy with Dr Blevins (12/5/2018 to 12/28/2018)  4. Adjuvant tamoxifen (2/4/2019 to ongoing)    HPI:    Since her last visit, she just started tamoxifen 4 days ago. She has been having some insomnia and spotting.  She will reach out to Dr Berrios to follow up. She tolerated radiation therapy well.    /83 (BP Location: Left arm, Patient Position: Chair, Cuff Size: Adult Regular)   Pulse 88   Temp 97.3  F (36.3  C) (Oral)   Resp 14   Ht 1.626 m (5' 4.02\")   Wt 82.5 kg (181 lb 12.8 oz)   SpO2 100%   BMI 31.19 kg/m      Physical Exam   Constitutional: She appears well-developed and well-nourished.   Pulmonary/Chest: Right breast exhibits no inverted nipple, no mass, no nipple discharge, no skin change and no tenderness. Left breast exhibits no inverted nipple, no mass, no nipple discharge, no skin change and no tenderness. Breasts are symmetrical.   Patient was examined in both supine and upright positions. Breast incision unremarkable. No nipple-areolar complex distortion or contour abnormality.   Lymphadenopathy:     She has no " cervical adenopathy.        Right cervical: No superficial cervical, no deep cervical and no posterior cervical adenopathy present.       Left cervical: No superficial cervical, no deep cervical and no posterior cervical adenopathy present.     She has no axillary adenopathy.        Right axillary: No pectoral and no lateral adenopathy present.        Left axillary: No pectoral and no lateral adenopathy present.       Right: No supraclavicular adenopathy present.        Left: No supraclavicular adenopathy present.   No lymphedema in bilateral upper extremities. Axillary incision unremarkable.   Skin: Skin is warm and dry.      ASSESSMENT:    Gabriella Villarreal is a 53 year old female with right breast cancer, 3 months s/p surgery.    She is doing well.  There is no clinical evidence of recurrence today.  We reviewed that breast cancer surveillance moving forward involves physical exam as well as annual mammography.  Her next mammogram is due approximately 6 months after completion of radiation (June 2019).  She will be following up with Dr Berrios as well.    I have not made specific follow-up appointments with me at this time and will defer ongoing care to Dr Berrios and to her PCP.  She knows to call if she has concerns.     Total time spent with the patient was 20 minutes, of which more than half was counseling.     PLAN:  1. Next mammogram due 6 months after completion of radiation (June 2019)  2. Follow up with Dr Yash Oviedo MD [facility] FACS    Division of Surgical Oncology  [facility]       Again, thank you for allowing me to participate in the care of your patient.      Sincerely,    Kalina Oviedo MD

## 2019-02-08 NOTE — PROGRESS NOTES
"FOLLOW-UP  Feb 8, 2019    Gabriella Villarreal is a 53 year old female who returns for follow-up for     Cancer Staging  Malignant neoplasm of upper-outer quadrant of right breast in female, estrogen receptor positive (H)  Staging form: Breast, AJCC 8th Edition  - Clinical: No stage assigned - Unsigned  - Pathologic: Stage IA (pT1c(2), pN0, cM0, G1, ER: Positive, IN: Positive, HER2: Negative) - Signed by Kalina Oviedo MD on 11/6/2018      Treatment to date:  1. Right wire-localized segmental mastectomy and sentinel lymph node biopsy (10/24/2018)  2. Oncotype DX 13  3. Adjuvant radiation therapy with Dr Blevins (12/5/2018 to 12/28/2018)  4. Adjuvant tamoxifen (2/4/2019 to ongoing)    HPI:    Since her last visit, she just started tamoxifen 4 days ago. She has been having some insomnia and spotting.  She will reach out to Dr Berrios to follow up. She tolerated radiation therapy well.    /83 (BP Location: Left arm, Patient Position: Chair, Cuff Size: Adult Regular)   Pulse 88   Temp 97.3  F (36.3  C) (Oral)   Resp 14   Ht 1.626 m (5' 4.02\")   Wt 82.5 kg (181 lb 12.8 oz)   SpO2 100%   BMI 31.19 kg/m     Physical Exam   Constitutional: She appears well-developed and well-nourished.   Pulmonary/Chest: Right breast exhibits no inverted nipple, no mass, no nipple discharge, no skin change and no tenderness. Left breast exhibits no inverted nipple, no mass, no nipple discharge, no skin change and no tenderness. Breasts are symmetrical.   Patient was examined in both supine and upright positions. Breast incision unremarkable. No nipple-areolar complex distortion or contour abnormality.   Lymphadenopathy:     She has no cervical adenopathy.        Right cervical: No superficial cervical, no deep cervical and no posterior cervical adenopathy present.       Left cervical: No superficial cervical, no deep cervical and no posterior cervical adenopathy present.     She has no axillary adenopathy.        Right " axillary: No pectoral and no lateral adenopathy present.        Left axillary: No pectoral and no lateral adenopathy present.       Right: No supraclavicular adenopathy present.        Left: No supraclavicular adenopathy present.   No lymphedema in bilateral upper extremities. Axillary incision unremarkable.   Skin: Skin is warm and dry.      ASSESSMENT:    Gabriella Villarreal is a 53 year old female with right breast cancer, 3 months s/p surgery.    She is doing well.  There is no clinical evidence of recurrence today.  We reviewed that breast cancer surveillance moving forward involves physical exam as well as annual mammography.  Her next mammogram is due approximately 6 months after completion of radiation (June 2019).  She will be following up with Dr Berrios as well.    I have not made specific follow-up appointments with me at this time and will defer ongoing care to Dr Berrios and to her PCP.  She knows to call if she has concerns.     Total time spent with the patient was 20 minutes, of which more than half was counseling.     PLAN:  1. Next mammogram due 6 months after completion of radiation (June 2019)  2. Follow up with Dr Yash Oviedo MD [facility] FACS    Division of Surgical Oncology  [facility]

## 2019-02-08 NOTE — Clinical Note
"2/8/2019      RE: Gabriella Villarreal  [address] [facility] [place] [address]       FOLLOW-UP  Feb 8, 2019    Gabriella Villarreal is a 53 year old female who returns for follow-up for     Cancer Staging  Malignant neoplasm of upper-outer quadrant of right breast in female, estrogen receptor positive (H)  Staging form: Breast, AJCC 8th Edition  - Clinical: No stage assigned - Unsigned  - Pathologic: Stage IA (pT1c(2), pN0, cM0, G1, ER: Positive, GA: Positive, HER2: Negative) - Signed by Kalina Oviedo MD on 11/6/2018      Treatment to date:  1. Right wire-localized segmental mastectomy and sentinel lymph node biopsy (10/24/2018)  2. Oncotype DX 13  3. Adjuvant radiation therapy with Dr Blevins (12/5/2018 to 12/28/2018)  4. Adjuvant tamoxifen (2/4/2019 to ongoing)    HPI:    Since her last visit, she just started tamoxifen 4 days ago. She has been having some insomnia and spotting.  She will reach out to Dr Berrios to follow up. She tolerated radiation therapy well.    /83 (BP Location: Left arm, Patient Position: Chair, Cuff Size: Adult Regular)   Pulse 88   Temp 97.3  F (36.3  C) (Oral)   Resp 14   Ht 1.626 m (5' 4.02\")   Wt 82.5 kg (181 lb 12.8 oz)   SpO2 100%   BMI 31.19 kg/m      Physical Exam   Constitutional: She appears well-developed and well-nourished.   Pulmonary/Chest: Right breast exhibits no inverted nipple, no mass, no nipple discharge, no skin change and no tenderness. Left breast exhibits no inverted nipple, no mass, no nipple discharge, no skin change and no tenderness. Breasts are symmetrical.   Patient was examined in both supine and upright positions. Breast incision unremarkable. No nipple-areolar complex distortion or contour abnormality.   Lymphadenopathy:     She has no cervical adenopathy.        Right cervical: No superficial cervical, no deep cervical and no posterior cervical adenopathy present.       Left cervical: No superficial cervical, no deep cervical and no " posterior cervical adenopathy present.     She has no axillary adenopathy.        Right axillary: No pectoral and no lateral adenopathy present.        Left axillary: No pectoral and no lateral adenopathy present.       Right: No supraclavicular adenopathy present.        Left: No supraclavicular adenopathy present.   No lymphedema in bilateral upper extremities. Axillary incision unremarkable.   Skin: Skin is warm and dry.      ASSESSMENT:    Gabriella Villarreal is a 53 year old female with right breast cancer, 3 months s/p surgery.    She is doing well.  There is no clinical evidence of recurrence today.  We reviewed that breast cancer surveillance moving forward involves physical exam as well as annual mammography.  Her next mammogram is due approximately 6 months after completion of radiation (June 2019).  She will be following up with Dr Berrios as well.    I have not made specific follow-up appointments with me at this time and will defer ongoing care to Dr Berrios and to her PCP.  She knows to call if she has concerns.     Total time spent with the patient was 20 minutes, of which more than half was counseling.     PLAN:  1. Next mammogram due 6 months after completion of radiation (June 2019)  2. Follow up with Dr Yash Oviedo MD [facility] FACS    Division of Surgical Oncology  [facility]       Kalina Oviedo MD

## 2019-02-08 NOTE — NURSING NOTE
"Oncology Rooming Note    February 8, 2019 1:15 PM   Gabriella Villarreal is a 53 year old female who presents for:    Chief Complaint   Patient presents with     Oncology Clinic Visit     [facility] POST OP- BREAST CA     Initial Vitals: /83 (BP Location: Left arm, Patient Position: Chair, Cuff Size: Adult Regular)   Pulse 88   Temp 97.3  F (36.3  C) (Oral)   Resp 14   Ht 1.626 m (5' 4.02\")   Wt 82.5 kg (181 lb 12.8 oz)   SpO2 100%   BMI 31.19 kg/m   Estimated body mass index is 31.19 kg/m  as calculated from the following:    Height as of this encounter: 1.626 m (5' 4.02\").    Weight as of this encounter: 82.5 kg (181 lb 12.8 oz). Body surface area is 1.93 meters squared.  No Pain (0) Comment: Data Unavailable   No LMP recorded. Patient is perimenopausal.  Allergies reviewed: Yes  Medications reviewed: Yes    Medications: Medication refills not needed today.  Pharmacy name entered into [facility]:    [facility] [place], [place] - [address] [place] [address] [phone]  [facility] [address] [place] [address] AT [address] [address] [place] [address]    Clinical concerns: No new concerns. Ivelisse was notified.    10 minutes for nursing intake (face to face time)     Aleksander Rosenberg LPN            "

## 2019-02-21 PROBLEM — Z17.0 MALIGNANT NEOPLASM OF UPPER-OUTER QUADRANT OF RIGHT BREAST IN FEMALE, ESTROGEN RECEPTOR POSITIVE (H): Status: ACTIVE | Noted: 2018-10-04

## 2019-02-21 PROBLEM — C50.411 MALIGNANT NEOPLASM OF UPPER-OUTER QUADRANT OF RIGHT BREAST IN FEMALE, ESTROGEN RECEPTOR POSITIVE (H): Status: ACTIVE | Noted: 2018-10-04

## 2019-05-01 ENCOUNTER — ONCOLOGY SURVIVORSHIP VISIT (OUTPATIENT)
Dept: ONCOLOGY | Facility: CLINIC | Age: 54
End: 2019-05-01
Attending: PHYSICIAN ASSISTANT
Payer: COMMERCIAL

## 2019-05-01 ENCOUNTER — ANCILLARY PROCEDURE (OUTPATIENT)
Dept: BONE DENSITY | Facility: CLINIC | Age: 54
End: 2019-05-01
Attending: STUDENT IN AN ORGANIZED HEALTH CARE EDUCATION/TRAINING PROGRAM
Payer: COMMERCIAL

## 2019-05-01 VITALS
DIASTOLIC BLOOD PRESSURE: 75 MMHG | SYSTOLIC BLOOD PRESSURE: 120 MMHG | WEIGHT: 179.3 LBS | RESPIRATION RATE: 16 BRPM | OXYGEN SATURATION: 99 % | HEIGHT: 64 IN | TEMPERATURE: 99.1 F | BODY MASS INDEX: 30.61 KG/M2 | HEART RATE: 83 BPM

## 2019-05-01 DIAGNOSIS — Z17.0 MALIGNANT NEOPLASM OF UPPER-OUTER QUADRANT OF RIGHT BREAST IN FEMALE, ESTROGEN RECEPTOR POSITIVE (H): Primary | ICD-10-CM

## 2019-05-01 DIAGNOSIS — Z17.0 MALIGNANT NEOPLASM OF UPPER-OUTER QUADRANT OF RIGHT BREAST IN FEMALE, ESTROGEN RECEPTOR POSITIVE (H): ICD-10-CM

## 2019-05-01 DIAGNOSIS — C50.411 MALIGNANT NEOPLASM OF UPPER-OUTER QUADRANT OF RIGHT BREAST IN FEMALE, ESTROGEN RECEPTOR POSITIVE (H): Primary | ICD-10-CM

## 2019-05-01 DIAGNOSIS — C50.411 MALIGNANT NEOPLASM OF UPPER-OUTER QUADRANT OF RIGHT BREAST IN FEMALE, ESTROGEN RECEPTOR POSITIVE (H): ICD-10-CM

## 2019-05-01 PROCEDURE — 99214 OFFICE O/P EST MOD 30 MIN: CPT | Mod: ZP | Performed by: PHYSICIAN ASSISTANT

## 2019-05-01 PROCEDURE — G0463 HOSPITAL OUTPT CLINIC VISIT: HCPCS | Mod: ZF

## 2019-05-01 RX ORDER — TAMOXIFEN CITRATE 10 MG/1
10 TABLET ORAL DAILY
Qty: 90 TABLET | Refills: 3 | Status: SHIPPED | OUTPATIENT
Start: 2019-05-01 | End: 2019-07-29

## 2019-05-01 ASSESSMENT — PAIN SCALES - GENERAL: PAINLEVEL: NO PAIN (0)

## 2019-05-01 ASSESSMENT — MIFFLIN-ST. JEOR: SCORE: 1413.62

## 2019-05-01 NOTE — PROGRESS NOTES
CANCER SURVIVORSHIP VISIT - END OF TREATMENT VISIT  May 1, 2019    REASON FOR VISIT: survivorship visit for history of breast cancer    CANCER HISTORY AND TREATMENT:  Ms. Villarreal is a 53 year old female who presents with a history of invasive mammary carcinoma involving the right breast. She underwent right sided wire-localized segmental mastectomy and sentinel lymph node biopsy on 10/24/18 with Dr. Kalina Oviedo. She was seen in follow-up in the post-operative period and has been noted to be healing well. Her course was complicated by a rash on the right breast and axilla that was pruritic and erythematous. There was some weeping as well. It eventually spread up into the right neck and down into the upper abdomen as well. She was seen by dermatology on 10/31/18 and was diagnosed with acute contact dermatitis. She was treated with steroids with improvement.  Her OncoType score was 13 and thus no adjuvant chemotherapy was recommended. She completed adjuvant radiation therapy at [facility] with Dr. Blevins in January of 2019 and then met with Dr Berrios for discussion of adjuvant endocrine therapy.  She started on Tamoxifen.    Lauren is here today for a survivorship visit and follow up on her Tamoxifen.        INTERVAL HISTORY:   Lauren is doing well, she developed cramping and vaginal bleeding, in addition to headaches and dizziness shortly after starting Tamoxifen (started on 2/4/19).  She stopped the drug and the bleeding stopped, she then started, as directed by Dr Berrios to 1/2 pill since then.  Thankfully she has been tolerating this fine without cramping or bleeding and no headaches or dizziness.  She has been staying busy at home and painting the house.  The rash she had on her right chest wall is gone and not returned.  No abdominal pain or new issues. Mood is good.       Otherwise, ROS negative for: fevers, headaches, visual changes, new sites of pain, shortness of breath, cough, chest pain, abdominal pain, nausea,  "vomiting, abnormal vaginal bleeding, or leg swelling.      Current Outpatient Medications   Medication Sig Dispense Refill     ACETAMINOPHEN PO Take 1,000 mg by mouth       albuterol (PROAIR HFA/PROVENTIL HFA/VENTOLIN HFA) 108 (90 Base) MCG/ACT inhaler Inhale 2-4 puffs into the lungs       chlorpheniramine (CHLOR-TRIMETON) 4 MG tablet Take 4 mg by mouth        diphenhydrAMINE (BENADRYL) 25 MG tablet        emollient (VANICREAM) cream Apply topically as needed for other (for dry skin) (Patient not taking: Reported on 2/8/2019) 57 g 0     fluticasone (FLONASE) 50 MCG/ACT spray INSTILL 2 SPRAYS INTO BOTH NOSTRILS DAILY.       ibuprofen (ADVIL/MOTRIN) 200 MG tablet Take 200 mg by mouth       levothyroxine (SYNTHROID/LEVOTHROID) 100 MCG tablet Take 100 mcg by mouth every morning        ranitidine (ZANTAC) 150 MG tablet Take 150 mg by mouth 2 times daily  60 tablet 1     tamoxifen (NOLVADEX) 20 MG tablet Take 1 tablet (20 mg) by mouth daily 90 tablet 3          Allergies   Allergen Reactions     Seasonal Allergies      Benzoyl Peroxide Rash     Mecrylate Rash     Ethyl-2 cyanoacrylate     Other  [No Clinical Screening - See Comments] Rash     octylisothiazonlinone and benzisothiazolinone, cetylprydidinium chloride       PHYSICAL EXAMINATION  /75   Pulse 83   Temp 99.1  F (37.3  C) (Oral)   Resp 16   Ht 1.626 m (5' 4.02\")   Wt 81.3 kg (179 lb 4.8 oz)   SpO2 99%   BMI 30.76 kg/m    Wt Readings from Last 4 Encounters:   05/01/19 81.3 kg (179 lb 4.8 oz)   02/08/19 82.5 kg (181 lb 12.8 oz)   01/28/19 82.6 kg (182 lb 1.6 oz)   11/19/18 82.5 kg (181 lb 14.4 oz)       Constitutional: Alert, oriented female in no visible distress.  Eyes: PERRL. Anicteric sclerae.  ENT/Mouth: OM moist and pink without lesions or thrush.  CV: RRR, no murmurs appreciated.  Resp: CTAB throughout  Abdomen: Soft, non-tender, non-distended. Bowel sounds present. No masses appreciated. No organomegaly noted.  Extremities: No lower extremity " edema appreciated.  Skin: Warm, dry. No bruising or petechiae noted.  Lymph: No cervical or supraclavicular lymphadenopathy appreciated.   Neuro: CN II-XII grossly intact.    LABS:  No results found for this or any previous visit (from the past 24 hour(s)).      IMPRESSION/PLAN:  Gabriella Villarreal is a 53 year old female with a history of stage 1A invasive mammary carcinoma, here for cancer survivorship visit following completion of cancer treatment.    Cancer history. Lauren had a ER/ND + and HER2 non-amplified (pT1c(2)N)) with an OncoTypeDX socre of 13. She had a lumpectomy with radiation therapy.  She will continue to follow-up with her oncology team as scheduled, with plan for follow-up every 3 months for the first 2-3 years, every 6 months for years 4-5, and annually thereafter.     She initially wasn't tolerating her Tamoxifen due to cramping, abdominal pain and bleeding.  She stopped for a few days- all her symptoms resolved and she currently is taking 1/2 pill daily and tolerating it well.  We discussed a couple days a week, trying a full pill to see if she can tolerate the full dose.  If not, we'll return to 1/2 pill daily.     Bryan and DR Berrios in July.     Genetic testing. She had genetic testing on 10/16/18.    Risk of developing osteoporosis. She is itz- menopausal. She had a baseline DEXA scan on 5/1/19 that we reviewed and is normal bone density.  The tamoxifen will be bone protective.  She should continue to have DEXA scans every 2-5 years, and was recommended to perform weightbearing exercises 2-3 days per week, and to supplement with 1200 mg of calcium, 1000 international unites of vitamin D daily.     Radiation side effects. Radiation would have included the lungs, bone, skin. She should seek medical attention if she notices any new skin lesions in the area of radiation. The bones are at risk for fractures, so she should inform a provider with any new pains in these areas. The lungs are at risk for  fibrosis, restrictive and/or obstructive lung disease. Currently, she is asymptomatic. If she should develop any shortness of breath, hemoptysis, cough, would consider further evaluation with CT or X-ray. No routine screening for potential lung complications unless symptomatic.    Coping: Well, she is staying busy and feeling grateful.    Risk of lymphedema: Can occur at any time. She will contact the clinic if she notices any swelling in her arm, and will be given a referral to the lymphedema specialists.    Cancer screening. She should undergo routine screening for women in her age group. She will have a Pap and pelvic exam as directed by her primary care provider (or annually if on Tamoxifen).     Healthy lifestyle. She should maintain a healthy weight with a BMI between 20-25. She should exercise at least 150 minutes weekly at moderate intensity. She should see the eye doctor every 1-2 years, and dentist every 6 months for cleanings. She should not use any tobacco. She should minimize alcohol intake. If continuing to drink, should follow CDC recommendations for no more than 1 alcoholic drink/day for women.    Andreia Kc PA-C

## 2019-05-01 NOTE — NURSING NOTE
"Oncology Rooming Note    May 1, 2019 9:08 AM   Gabriella Villarreal is a 53 year old female who presents for:    Chief Complaint   Patient presents with     Oncology Clinic Visit     RETURN VISIT; BREAST CA FOLLOW UP; VITALS COMPLETED BY [facility]      Initial Vitals: /75   Pulse 83   Temp 99.1  F (37.3  C) (Oral)   Resp 16   Ht 1.626 m (5' 4.02\")   Wt 81.3 kg (179 lb 4.8 oz)   SpO2 99%   BMI 30.76 kg/m   Estimated body mass index is 30.76 kg/m  as calculated from the following:    Height as of this encounter: 1.626 m (5' 4.02\").    Weight as of this encounter: 81.3 kg (179 lb 4.8 oz). Body surface area is 1.92 meters squared.  No Pain (0) Comment: Data Unavailable   No LMP recorded. Patient is perimenopausal.  Allergies reviewed: Yes  Medications reviewed: Yes    Medications: MEDICATION REFILLS NEEDED TODAY. Provider was notified. Patient needs a refill on Tamoxifen. Patient is requesting a 10 mg tablet.     Pharmacy name entered into [employer]:    [facility] [phone]  [facility] [address] [place] [address] [place]  AT [address] [address] [place] [address] [facility] [facility]    Clinical concerns: Patient complains of pelvic pressure and feels like she has an urgency of urination. Patient did see her Primary OBGYN.  Bea Kc was notified.      Mei Nina              "

## 2019-07-08 PROBLEM — C50.411 MALIGNANT NEOPLASM OF UPPER-OUTER QUADRANT OF RIGHT BREAST IN FEMALE, ESTROGEN RECEPTOR POSITIVE (H): Status: ACTIVE | Noted: 2018-09-19

## 2019-07-08 PROBLEM — Z17.0 MALIGNANT NEOPLASM OF UPPER-OUTER QUADRANT OF RIGHT BREAST IN FEMALE, ESTROGEN RECEPTOR POSITIVE (H): Status: ACTIVE | Noted: 2018-09-19

## 2019-07-29 ENCOUNTER — ONCOLOGY VISIT (OUTPATIENT)
Dept: ONCOLOGY | Facility: CLINIC | Age: 54
End: 2019-07-29
Attending: INTERNAL MEDICINE
Payer: COMMERCIAL

## 2019-07-29 ENCOUNTER — ANCILLARY PROCEDURE (OUTPATIENT)
Dept: MAMMOGRAPHY | Facility: CLINIC | Age: 54
End: 2019-07-29
Attending: STUDENT IN AN ORGANIZED HEALTH CARE EDUCATION/TRAINING PROGRAM
Payer: COMMERCIAL

## 2019-07-29 VITALS
WEIGHT: 173.8 LBS | HEIGHT: 64 IN | SYSTOLIC BLOOD PRESSURE: 121 MMHG | DIASTOLIC BLOOD PRESSURE: 77 MMHG | BODY MASS INDEX: 29.67 KG/M2 | OXYGEN SATURATION: 97 % | RESPIRATION RATE: 16 BRPM | TEMPERATURE: 99.2 F | HEART RATE: 71 BPM

## 2019-07-29 DIAGNOSIS — Z17.0 MALIGNANT NEOPLASM OF UPPER-OUTER QUADRANT OF RIGHT BREAST IN FEMALE, ESTROGEN RECEPTOR POSITIVE (H): ICD-10-CM

## 2019-07-29 DIAGNOSIS — C50.411 MALIGNANT NEOPLASM OF UPPER-OUTER QUADRANT OF RIGHT BREAST IN FEMALE, ESTROGEN RECEPTOR POSITIVE (H): ICD-10-CM

## 2019-07-29 DIAGNOSIS — Z85.3 HX OF BREAST CANCER: ICD-10-CM

## 2019-07-29 PROCEDURE — G0463 HOSPITAL OUTPT CLINIC VISIT: HCPCS | Mod: ZF

## 2019-07-29 PROCEDURE — 99213 OFFICE O/P EST LOW 20 MIN: CPT | Mod: GC | Performed by: INTERNAL MEDICINE

## 2019-07-29 RX ORDER — PSEUDOEPHEDRINE HCL 30 MG
30 TABLET ORAL EVERY 4 HOURS PRN
COMMUNITY

## 2019-07-29 RX ORDER — TAMOXIFEN CITRATE 10 MG/1
10 TABLET ORAL 2 TIMES DAILY
Qty: 180 TABLET | Refills: 3 | Status: SHIPPED | OUTPATIENT
Start: 2019-07-29 | End: 2020-08-17

## 2019-07-29 ASSESSMENT — PAIN SCALES - GENERAL: PAINLEVEL: NO PAIN (0)

## 2019-07-29 ASSESSMENT — MIFFLIN-ST. JEOR: SCORE: 1388.67

## 2019-07-29 NOTE — LETTER
7/29/2019       RE: Gabriella Villarreal  [address] [facility] [place] [address]     Dear Colleague,    Thank you for referring your patient, Gabriella Villarreal, to the [facility]. Please see a copy of my visit note below.    Oncology Visit  Date on this visit: 7/29/2019    Diagnosis: Invasive mammary carcinoma of the right breast.      History Of Present Illness:  Ms. Villarreal is a 53 year old female who presents with new diagnosis of invasive mammary carcinoma involving the right breast. She underwent right sided wire-localized segmental mastectomy and sentinel lymph node biopsy on 10/24/18 with Dr. Kalina Oviedo. She was seen in follow-up in the post-operative period and has been noted to be healing well. Her course was complicated by a rash on the right breast and axilla that was pruritic and erythematous. There was some weeping as well. It eventually spread up into the right neck and down into the upper abdomen as well. She was seen by dermatology on 10/31/18 and was diagnosed with acute contact dermatitis. She was treated with steroids and the rash has significantly improved. She has a follow-up appointment with dermatology later today, for consideration of patch testing. Her OncoType score was 13 and thus no adjuvant chemotherapy was recommended. She completed adjuvant radiation therapy at [facility] with Dr. Blevins and is here today to discuss adjuvant endocrine therapy.     Treatment to date:  1. Right wire-localized segmental mastectomy and sentinel lymph node biopsy (10/24/2018)  2. Oncotype DX 13  3. Adjuvant radiation therapy with Dr Blevins (12/5/2018 to 12/28/2018)  4. Adjuvant tamoxifen (2/4/2019 to ongoing)      Interval history:   Lauren reports that overall she has been feeling very well.     She denies any pain, swelling, tenderness, range of motion issues on her right side. No new abnormalities on the left.  She denies fevers chills sweats. No shortness of breath, no chest  "pain, no abdominal pain.    She was started on Tamoxifen in Feb/2019 and had significant reaction to it including vaginal irregular bleeding with cramping pain, mood changes, and night sweat. In May, she decreased Tamoxifen to 10mg daily after discussion with Dr Berrios, and gradually titrate the dose up, since middle of June, she has been on 20mg daily and has been tolerating it well with only intermittent hot flushes. No other side effects.   She had her last period on 7/13/19, it was heavy and last for 4-5 days  Her appetite is good, her weight was stable.   She denies short of breath, no chest pain, no abdominal pain, no n/v/d.     ROS: all negative unless mentioned above.        Past Medical/Surgical History:  Past Medical History        Past Medical History:   Diagnosis Date     Allergic rhinitis       Chronic cough       GERD (gastroesophageal reflux disease)       Hypothyroidism           Past Surgical History         Past Surgical History:   Procedure Laterality Date     CHOLECYSTECTOMY         L salpingooophorectomy   2015     LUMPECTOMY BREAST WITH SENTINEL NODE, COMBINED Right 10/24/2018     Procedure: Right Wire Localized Segmental Mastectomy (Lumpectomy), Right [place] Lymph Node Biopsy;  Surgeon: Kalina Oviedo MD;  Location:  OR         Menstrual History:   Menarche around age 15.  Three pregnancies, uncomplicated.   the first two children.  Was not on OCPs until her mid-40s, at which time she started \"low-dose\" combined OCPs for painful periods.  Still having relatively regular monthly cycles, some just spotting, others \"normal periods\".          Physical Exam:  /77   Pulse 71   Temp 99.2  F (37.3  C) (Oral)   Resp 16   Ht 1.626 m (5' 4.02\")   Wt 78.8 kg (173 lb 12.8 oz)   SpO2 97%   Breastfeeding? No   BMI 29.81 kg/m       Gen: alert, pleasant and conversational, NAD  HEENT: NC/AT, EOMI w/ PERRL, anicteric sclera.  OP clear. MMM. Neck supple  Lymph: no palpable " cervical, clavicular or axillary LAD.   CV: normal S1,S2 with RRR no m/r/g  Resp: lungs CTA bilaterally with adequate air movement to bases. No wheezes or crackles  Abd: soft NTND no organomegaly or masses. BS normoactive.   Ext: WWP no edema or cyanosis  Skin: no concerning lesions or rashes. Right axilla with well-healed incisional scar and mild skin color changes.   Neuro: A&Ox4, no lateralizing sx. Grossly nonfocal.        Laboratory/Imaging Studies  No new image or labs.       ASSESSMENT/PLAN:  Gabriella Villarreal is a 53 year old woman with recently diagnosed Stage IA invasive mammary carcinoma involving the right breast, ER+/KS+ and Her2 non-amplified (pT1c(2)N0). She underwent wire-localized right-sided segmental mastectomy with Dr. Kalina Oviedo on 10/24/18. Her course was complicated by an intensely pruritic rash, which started in the right axilla and spread to the right breast, up to the neck and into the upper right abdomen. This was diagnosed as acute contact dermatitis and resolved with a course of steroids.  she has been on Tamoxifen for adjuvant endocrine therapy since (2/4/19).      #  Breast cancer - ER+/KS+ and Her2 non-amplified (pT1c(2)N0). OncoTypeDX score is 13, indicating low-risk of distant recurrence and no additional benefit of chemotherapy. With her hormone-receptor positive disease, and perimenopausal status we would continue with adjuvant endocrine therapy with Tamoxifen after radiation, she was started on Tamoxifen since Feb, initially was not tolerating but doing well after dose reduction and gradually titrate up dosage, she has been on Tamoxifen 20mg daily since middle of June and has been doing well.   Since she is on Tamoxifen and has not history of osteoporosis, she does not need a baseline DEXA scan so far yet, will revisit this question regularly in the future.   She had diagnostic mammogram today showed benign findings, due to repeat in 1 year.      Encourage regular exercise and  healthy diet, for which she is doing.        Follow up in the clinic in 6 months.        Pt seen and examined with Dr. Yash Garcia MD  Hem/Onc Fellow    I am seeing Lauren in followup.  She was seen and evaluated by me with Dr. Ashely Garcia.  I edited the above note to reflect my evaluation.  She is feeling well and is very thrilled with how she is doing and the fact that her mammogram was stable.  She is tolerating her tamoxifen.  She is able to titrate this up and is feeling very pleased with this.  She continues to have menstrual periods.  These are not abnormal in duration.      PHYSICAL EXAMINATION:   GENERAL:  She is well appearing, in no apparent distress.   LYMPH:  She has no cervical, supraclavicular or axillary adenopathy.   HEART:  Regular.   LUNGS:  Clear bilaterally.   BREASTS:  Examination reveals symmetric breasts.  There are no nodules or masses in either breast, no axillary adenopathy.      I reviewed with Lauren that she has a T1cN0, low Oncotype, ER-positive, IN-positive, HER-2 negative breast cancer involving the right breast, status post lumpectomy and radiation.  We discussed the standard of care is to continue at least 5 years of endocrine therapy.  She is currently using tamoxifen and is tolerating this well.  If she becomes postmenopausal, we may consider switching her to an aromatase inhibitor as per the ATAC study and MA-17.      There is data to support 5-10 years of endocrine therapy.  We will rediscuss this as she gets closer to that.      She did have a diagnostic mammogram today, which was unremarkable.      I encouraged a healthy lifestyle and regular exercise as well as limiting alcohol use.      She will return to clinic in 6 months.     Karen Berrios MD

## 2019-07-29 NOTE — PROGRESS NOTES
Oncology Visit  Date on this visit: 7/29/2019    Diagnosis: Invasive mammary carcinoma of the right breast.      History Of Present Illness:  Ms. Villarreal is a 53 year old female who presents with new diagnosis of invasive mammary carcinoma involving the right breast. She underwent right sided wire-localized segmental mastectomy and sentinel lymph node biopsy on 10/24/18 with Dr. Kalina Oviedo. She was seen in follow-up in the post-operative period and has been noted to be healing well. Her course was complicated by a rash on the right breast and axilla that was pruritic and erythematous. There was some weeping as well. It eventually spread up into the right neck and down into the upper abdomen as well. She was seen by dermatology on 10/31/18 and was diagnosed with acute contact dermatitis. She was treated with steroids and the rash has significantly improved. She has a follow-up appointment with dermatology later today, for consideration of patch testing. Her OncoType score was 13 and thus no adjuvant chemotherapy was recommended. She completed adjuvant radiation therapy at [facility] with Dr. Blevins and is here today to discuss adjuvant endocrine therapy.     Treatment to date:  1. Right wire-localized segmental mastectomy and sentinel lymph node biopsy (10/24/2018)  2. Oncotype DX 13  3. Adjuvant radiation therapy with Dr Blevins (12/5/2018 to 12/28/2018)  4. Adjuvant tamoxifen (2/4/2019 to ongoing)      Interval history:   Lauren reports that overall she has been feeling very well.     She denies any pain, swelling, tenderness, range of motion issues on her right side. No new abnormalities on the left.  She denies fevers chills sweats. No shortness of breath, no chest pain, no abdominal pain.    She was started on Tamoxifen in Feb/2019 and had significant reaction to it including vaginal irregular bleeding with cramping pain, mood changes, and night sweat. In May, she decreased Tamoxifen to 10mg daily after  "discussion with Dr Berrios, and gradually titrate the dose up, since middle of June, she has been on 20mg daily and has been tolerating it well with only intermittent hot flushes. No other side effects.   She had her last period on 7/13/19, it was heavy and last for 4-5 days  Her appetite is good, her weight was stable.   She denies short of breath, no chest pain, no abdominal pain, no n/v/d.     ROS: all negative unless mentioned above.        Past Medical/Surgical History:  Past Medical History        Past Medical History:   Diagnosis Date     Allergic rhinitis       Chronic cough       GERD (gastroesophageal reflux disease)       Hypothyroidism           Past Surgical History         Past Surgical History:   Procedure Laterality Date     CHOLECYSTECTOMY         L salpingooophorectomy   2015     LUMPECTOMY BREAST WITH SENTINEL NODE, COMBINED Right 10/24/2018     Procedure: Right Wire Localized Segmental Mastectomy (Lumpectomy), Right [place] Lymph Node Biopsy;  Surgeon: Kalina Oviedo MD;  Location: [facility] OR         Menstrual History:   Menarche around age 15.  Three pregnancies, uncomplicated.   the first two children.  Was not on OCPs until her mid-40s, at which time she started \"low-dose\" combined OCPs for painful periods.  Still having relatively regular monthly cycles, some just spotting, others \"normal periods\".          Physical Exam:  /77   Pulse 71   Temp 99.2  F (37.3  C) (Oral)   Resp 16   Ht 1.626 m (5' 4.02\")   Wt 78.8 kg (173 lb 12.8 oz)   SpO2 97%   Breastfeeding? No   BMI 29.81 kg/m      Gen: alert, pleasant and conversational, NAD  HEENT: NC/AT, EOMI w/ PERRL, anicteric sclera.  OP clear. MMM. Neck supple  Lymph: no palpable cervical, clavicular or axillary LAD.   CV: normal S1,S2 with RRR no m/r/g  Resp: lungs CTA bilaterally with adequate air movement to bases. No wheezes or crackles  Abd: soft NTND no organomegaly or masses. BS normoactive.   Ext: WWP no edema or " cyanosis  Skin: no concerning lesions or rashes. Right axilla with well-healed incisional scar and mild skin color changes.   Neuro: A&Ox4, no lateralizing sx. Grossly nonfocal.        Laboratory/Imaging Studies  No new image or labs.       ASSESSMENT/PLAN:  Gabriella Villarreal is a 53 year old woman with recently diagnosed Stage IA invasive mammary carcinoma involving the right breast, ER+/NC+ and Her2 non-amplified (pT1c(2)N0). She underwent wire-localized right-sided segmental mastectomy with Dr. Kalina Oviedo on 10/24/18. Her course was complicated by an intensely pruritic rash, which started in the right axilla and spread to the right breast, up to the neck and into the upper right abdomen. This was diagnosed as acute contact dermatitis and resolved with a course of steroids. she has been on Tamoxifen for adjuvant endocrine therapy since (2/4/19).      #  Breast cancer - ER+/NC+ and Her2 non-amplified (pT1c(2)N0). OncoTypeDX score is 13, indicating low-risk of distant recurrence and no additional benefit of chemotherapy. With her hormone-receptor positive disease, and perimenopausal status we would continue with adjuvant endocrine therapy with Tamoxifen after radiation, she was started on Tamoxifen since Feb, initially was not tolerating but doing well after dose reduction and gradually titrate up dosage, she has been on Tamoxifen 20mg daily since middle of June and has been doing well.   Since she is on Tamoxifen and has not history of osteoporosis, she does not need a baseline DEXA scan so far yet, will revisit this question regularly in the future.   She had diagnostic mammogram today showed benign findings, due to repeat in 1 year.      Encourage regular exercise and healthy diet, for which she is doing.        Follow up in the clinic in 6 months.        Pt seen and examined with Dr. Yash Garcia MD  Hem/Onc Fellow    I am seeing Lauren in followup.  She was seen and evaluated by me with Dr. Ashely Garcia.  I  edited the above note to reflect my evaluation.  She is feeling well and is very thrilled with how she is doing and the fact that her mammogram was stable.  She is tolerating her tamoxifen.  She is able to titrate this up and is feeling very pleased with this.  She continues to have menstrual periods.  These are not abnormal in duration.      PHYSICAL EXAMINATION:   GENERAL:  She is well appearing, in no apparent distress.   LYMPH:  She has no cervical, supraclavicular or axillary adenopathy.   HEART:  Regular.   LUNGS:  Clear bilaterally.   BREASTS:  Examination reveals symmetric breasts.  There are no nodules or masses in either breast, no axillary adenopathy.      I reviewed with Lauren that she has a T1cN0, low Oncotype, ER-positive, MO-positive, HER-2 negative breast cancer involving the right breast, status post lumpectomy and radiation.  We discussed the standard of care is to continue at least 5 years of endocrine therapy.  She is currently using tamoxifen and is tolerating this well.  If she becomes postmenopausal, we may consider switching her to an aromatase inhibitor as per the ATAC study and MA-17.      There is data to support 5-10 years of endocrine therapy.  We will rediscuss this as she gets closer to that.      She did have a diagnostic mammogram today, which was unremarkable.      I encouraged a healthy lifestyle and regular exercise as well as limiting alcohol use.      She will return to clinic in 6 months.     Karen Berrios MD

## 2019-07-29 NOTE — NURSING NOTE
"Oncology Rooming Note    July 29, 2019 11:05 AM   Gabriella Villarreal is a 53 year old female who presents for:    Chief Complaint   Patient presents with     Oncology Clinic Visit     RETURN VISIT; BREAST CA; VITALS COMPLETED BY CMA      Initial Vitals: /77   Pulse 71   Temp 99.2  F (37.3  C) (Oral)   Resp 16   Ht 1.626 m (5' 4.02\")   Wt 78.8 kg (173 lb 12.8 oz)   SpO2 97%   Breastfeeding? No   BMI 29.81 kg/m   Estimated body mass index is 29.81 kg/m  as calculated from the following:    Height as of this encounter: 1.626 m (5' 4.02\").    Weight as of this encounter: 78.8 kg (173 lb 12.8 oz). Body surface area is 1.89 meters squared.  No Pain (0) Comment: Data Unavailable   No LMP recorded. Patient is perimenopausal.  Allergies reviewed: Yes  Medications reviewed: Yes    Medications: Medication refills not needed today.  Pharmacy name entered into [facility]:    [facility] [place] [id number] [facility] [phone]  [id number] [address] AT [address] [address] [place] [address] [facility]    Clinical concerns: No new concerns today  Dr Berrios was notified.      Mei Nina              "

## 2020-01-17 DIAGNOSIS — C50.411 MALIGNANT NEOPLASM OF UPPER-OUTER QUADRANT OF RIGHT BREAST IN FEMALE, ESTROGEN RECEPTOR POSITIVE (H): ICD-10-CM

## 2020-01-17 DIAGNOSIS — Z17.0 MALIGNANT NEOPLASM OF UPPER-OUTER QUADRANT OF RIGHT BREAST IN FEMALE, ESTROGEN RECEPTOR POSITIVE (H): ICD-10-CM

## 2020-01-17 RX ORDER — TAMOXIFEN CITRATE 10 MG/1
TABLET ORAL
Qty: 90 TABLET | Refills: 3 | OUTPATIENT
Start: 2020-01-17

## 2020-01-17 RX ORDER — TAMOXIFEN CITRATE 20 MG/1
20 TABLET ORAL DAILY
Qty: 90 TABLET | Refills: 3 | Status: SHIPPED | OUTPATIENT
Start: 2020-01-17 | End: 2020-08-17

## 2020-01-17 NOTE — TELEPHONE ENCOUNTER
Called pt and she confirmed she is taking tamoxifen 20 mg daily, does not matter whether we order 10 mg tablets or 20 mg. Erx sent for one-year supply to [email or web address].

## 2020-03-11 ENCOUNTER — HEALTH MAINTENANCE LETTER (OUTPATIENT)
Age: 55
End: 2020-03-11

## 2020-08-03 ENCOUNTER — ANCILLARY PROCEDURE (OUTPATIENT)
Dept: MAMMOGRAPHY | Facility: CLINIC | Age: 55
End: 2020-08-03
Payer: COMMERCIAL

## 2020-08-03 DIAGNOSIS — Z12.31 VISIT FOR SCREENING MAMMOGRAM: ICD-10-CM

## 2020-08-17 ENCOUNTER — VIRTUAL VISIT (OUTPATIENT)
Dept: ONCOLOGY | Facility: CLINIC | Age: 55
End: 2020-08-17
Attending: INTERNAL MEDICINE
Payer: COMMERCIAL

## 2020-08-17 DIAGNOSIS — Z17.0 MALIGNANT NEOPLASM OF UPPER-OUTER QUADRANT OF RIGHT BREAST IN FEMALE, ESTROGEN RECEPTOR POSITIVE (H): ICD-10-CM

## 2020-08-17 DIAGNOSIS — C50.411 MALIGNANT NEOPLASM OF UPPER-OUTER QUADRANT OF RIGHT BREAST IN FEMALE, ESTROGEN RECEPTOR POSITIVE (H): ICD-10-CM

## 2020-08-17 PROCEDURE — 40001009 ZZH VIDEO/TELEPHONE VISIT; NO CHARGE

## 2020-08-17 PROCEDURE — 99213 OFFICE O/P EST LOW 20 MIN: CPT | Mod: GT | Performed by: INTERNAL MEDICINE

## 2020-08-17 RX ORDER — TAMOXIFEN CITRATE 20 MG/1
20 TABLET ORAL DAILY
Qty: 90 TABLET | Refills: 3 | Status: SHIPPED | OUTPATIENT
Start: 2020-08-17 | End: 2022-02-07

## 2020-08-17 NOTE — LETTER
8/17/2020         RE: Gabriella Villarreal  [address]  [facility] [place] [address]        Dear Colleague,    Thank you for referring your patient, Gabriella Villarreal, to the [facility]. Please see a copy of my visit note below.    We reviewed this video visit, and consent was obtained to conduct visit via video given the current covid pandemic.      Pt was at home.  I was in the [facility].  Video Time:  20 min.      Oncology Visit  Date on this visit: August 17, 2020    Diagnosis: Invasive mammary carcinoma of the right breast.      History Of Present Illness:  Ms. Villarreal is a 54 year old female who presents with diagnosis of invasive mammary carcinoma involving the right breast. She underwent right sided wire-localized segmental mastectomy and sentinel lymph node biopsy on 10/24/18 with Dr. Kalina Oviedo. T1c(2)N0.  Her OncoType score was 13 and thus no adjuvant chemotherapy was recommended. She completed adjuvant radiation therapy at [facility] with Dr. Blevins and is here today to discuss ongoing care.     Treatment to date:  1. Right wire-localized segmental mastectomy and sentinel lymph node biopsy (10/24/2018) T1c(2)N0  2. Oncotype DX 13  3. Adjuvant radiation therapy with Dr Blevins (12/5/2018 to 12/28/2018)  4. Adjuvant tamoxifen (2/4/2019 to ongoing)    Interval history:   Lauren reports that overall she has been feeling very well. She recently had a visit with a primary physician for hypothyroidism.  She had bloodwork in July and it was normal.      She denies any pain, swelling, tenderness, range of motion issues on her right side. No new abnormalities on the left.  She denies fevers chills sweats. No shortness of breath, no chest pain, no abdominal pain.   Her appetite is good, her weight was stable.  She denies short of breath, no chest pain, no abdominal pain, no n/v/d.     LMP July 20, 2020  Heavy period the first few days and then resolved in five days total.  She  "does not get them regularly.    No nodules or masses, had a mammogram last week.    ROS: all negative unless mentioned above.        Past Medical/Surgical History:  Past Medical History        Past Medical History:   Diagnosis Date     Allergic rhinitis       Chronic cough       GERD (gastroesophageal reflux disease)       Hypothyroidism           Past Surgical History         Past Surgical History:   Procedure Laterality Date     CHOLECYSTECTOMY         L salpingooophorectomy   2015     LUMPECTOMY BREAST WITH SENTINEL NODE, COMBINED Right 10/24/2018     Procedure: Right Wire Localized Segmental Mastectomy (Lumpectomy), Right [place] Lymph Node Biopsy;  Surgeon: Kalina Oviedo MD;  Location:  OR         Menstrual History:   Menarche around age 15.  Three pregnancies, uncomplicated.   the first two children.  Was not on OCPs until her mid-40s, at which time she started \"low-dose\" combined OCPs for painful periods.  Still having relatively regular monthly cycles, some just spotting, others \"normal periods\".          Physical Exam:  There were no vitals taken for this visit.  GENERAL: Healthy, alert and no distress  EYES: Eyes grossly normal to inspection.  No discharge or erythema, or obvious scleral/conjunctival abnormalities.  RESP: No audible wheeze, cough, or visible cyanosis.  No visible retractions or increased work of breathing.  SKIN: Visible skin clear. No significant rash, abnormal pigmentation or lesions.  NEURO: Cranial nerves grossly intact.  Mentation and speech appropriate for age.  PSYCH: Mentation appears normal, affect normal/bright, judgement and insight intact, normal speech and appearance well-groomed.      We ended up switching to a telephone visit, due to connection problems.    Laboratory/Imaging Studies  Mammogram 8/3/20 - benign    ASSESSMENT/PLAN:  Gabriella Villarreal is a 54 year old woman with recently diagnosed Stage IA invasive mammary carcinoma involving the right " "breast, ER+/SC+ and Her2 non-amplified (pT1c(2)N0). She underwent wire-localized right-sided segmental mastectomy with Dr. Kalina Oviedo on 10/24/18. Her course was complicated by an intensely pruritic rash, which started in the right axilla and spread to the right breast, up to the neck and into the upper right abdomen. This was diagnosed as acute contact dermatitis and resolved with a course of steroids. she has been on Tamoxifen for adjuvant endocrine therapy since (2/4/19).      #  Breast cancer - ER+/SC+ and Her2 non-amplified (pT1c(2)N0). OncoTypeDX score is 13, indicating low-risk of distant recurrence and no additional benefit of chemotherapy. With her hormone-receptor positive disease, and perimenopausal status we would continue with adjuvant endocrine therapy with Tamoxifen 20mg daily      # Bone Health - Dexa scan T score -1.3.  She is on tamoxifen    # Lifestyle - Encourage regular exercise and healthy diet, for which she is doing.   She is gradually losing weight.  She is staying busy gardening and [employer].       She does not drink alcohol.    Follow up in the clinic in 6 months.      Karen Berrios MD    Gabriella Villarreal is a 54 year old female who is being evaluated via a billable video visit.      The patient has been notified of following:     \"This video visit will be conducted via a call between you and your physician/provider. We have found that certain health care needs can be provided without the need for an in-person physical exam.  This service lets us provide the care you need with a video conversation.  If a prescription is necessary we can send it directly to your pharmacy.  If lab work is needed we can place an order for that and you can then stop by our lab to have the test done at a later time.    Video visits are billed at different rates depending on your insurance coverage.  Please reach out to your insurance provider with any questions.    If during the course of the call the " "physician/provider feels a video visit is not appropriate, you will not be charged for this service.\"    Patient has given verbal consent for Video visit? Yes  How would you like to obtain your AVS? MyChart  If you are dropped from the video visit, the video invite should be resent to: Send to e-mail at: [email or web address]  Will anyone else be joining your video visit? No        Video-Visit Details    Type of service:  Video Visit      I have reviewed and updated the patient's allergies and medication list. Patient was asked if they had any patient reported vital signs to present, if yes, please see documented vitals.  Patient was also asked for their current weight and height, if presented, documented in vitals.    Concerns: None    Refills: Pt needs refill of Tamoxafin.     Dona Chiu CMA      "

## 2020-08-17 NOTE — PROGRESS NOTES
We reviewed this video visit, and consent was obtained to conduct visit via video given the current covid pandemic.      Pt was at home.  I was in the [facility].  Video Time:  20 min.      Oncology Visit  Date on this visit: August 17, 2020    Diagnosis: Invasive mammary carcinoma of the right breast.      History Of Present Illness:  Ms. Villarreal is a 54 year old female who presents with diagnosis of invasive mammary carcinoma involving the right breast. She underwent right sided wire-localized segmental mastectomy and sentinel lymph node biopsy on 10/24/18 with Dr. Kalina Oviedo. T1c(2)N0.  Her OncoType score was 13 and thus no adjuvant chemotherapy was recommended. She completed adjuvant radiation therapy at [facility] with Dr. Blevins and is here today to discuss ongoing care.     Treatment to date:  1. Right wire-localized segmental mastectomy and sentinel lymph node biopsy (10/24/2018) T1c(2)N0  2. Oncotype DX 13  3. Adjuvant radiation therapy with Dr Blevins (12/5/2018 to 12/28/2018)  4. Adjuvant tamoxifen (2/4/2019 to ongoing)    Interval history:   Lauren reports that overall she has been feeling very well. She recently had a visit with a primary physician for hypothyroidism.  She had bloodwork in July and it was normal.      She denies any pain, swelling, tenderness, range of motion issues on her right side. No new abnormalities on the left.  She denies fevers chills sweats. No shortness of breath, no chest pain, no abdominal pain.   Her appetite is good, her weight was stable.  She denies short of breath, no chest pain, no abdominal pain, no n/v/d.     LMP July 20, 2020  Heavy period the first few days and then resolved in five days total.  She does not get them regularly.    No nodules or masses, had a mammogram last week.    ROS: all negative unless mentioned above.        Past Medical/Surgical History:  Past Medical History        Past Medical History:   Diagnosis Date     Allergic rhinitis    "    Chronic cough       GERD (gastroesophageal reflux disease)       Hypothyroidism           Past Surgical History         Past Surgical History:   Procedure Laterality Date     CHOLECYSTECTOMY         L salpingooophorectomy   2015     LUMPECTOMY BREAST WITH SENTINEL NODE, COMBINED Right 10/24/2018     Procedure: Right Wire Localized Segmental Mastectomy (Lumpectomy), Right [place] Lymph Node Biopsy;  Surgeon: Kalina Oviedo MD;  Location:  OR         Menstrual History:   Menarche around age 15.  Three pregnancies, uncomplicated.   the first two children.  Was not on OCPs until her mid-40s, at which time she started \"low-dose\" combined OCPs for painful periods.  Still having relatively regular monthly cycles, some just spotting, others \"normal periods\".          Physical Exam:  There were no vitals taken for this visit.  GENERAL: Healthy, alert and no distress  EYES: Eyes grossly normal to inspection.  No discharge or erythema, or obvious scleral/conjunctival abnormalities.  RESP: No audible wheeze, cough, or visible cyanosis.  No visible retractions or increased work of breathing.  SKIN: Visible skin clear. No significant rash, abnormal pigmentation or lesions.  NEURO: Cranial nerves grossly intact.  Mentation and speech appropriate for age.  PSYCH: Mentation appears normal, affect normal/bright, judgement and insight intact, normal speech and appearance well-groomed.      We ended up switching to a telephone visit, due to connection problems.    Laboratory/Imaging Studies  Mammogram 8/3/20 - benign    ASSESSMENT/PLAN:  Gabriella Villarreal is a 54 year old woman with recently diagnosed Stage IA invasive mammary carcinoma involving the right breast, ER+/NC+ and Her2 non-amplified (pT1c(2)N0). She underwent wire-localized right-sided segmental mastectomy with Dr. Kalina Oviedo on 10/24/18. Her course was complicated by an intensely pruritic rash, which started in the right axilla and spread to the right " breast, up to the neck and into the upper right abdomen. This was diagnosed as acute contact dermatitis and resolved with a course of steroids. she has been on Tamoxifen for adjuvant endocrine therapy since (2/4/19).      #  Breast cancer - ER+/KY+ and Her2 non-amplified (pT1c(2)N0). OncoTypeDX score is 13, indicating low-risk of distant recurrence and no additional benefit of chemotherapy. With her hormone-receptor positive disease, and perimenopausal status we would continue with adjuvant endocrine therapy with Tamoxifen 20mg daily      # Bone Health - Dexa scan T score -1.3.  She is on tamoxifen    # Lifestyle - Encourage regular exercise and healthy diet, for which she is doing.   She is gradually losing weight.  She is staying busy gardening and [employer].       She does not drink alcohol.    Follow up in the clinic in 6 months.      Karen Berrios MD

## 2020-08-17 NOTE — PROGRESS NOTES
"Gabriella Vlilarreal is a 54 year old female who is being evaluated via a billable video visit.      The patient has been notified of following:     \"This video visit will be conducted via a call between you and your physician/provider. We have found that certain health care needs can be provided without the need for an in-person physical exam.  This service lets us provide the care you need with a video conversation.  If a prescription is necessary we can send it directly to your pharmacy.  If lab work is needed we can place an order for that and you can then stop by our lab to have the test done at a later time.    Video visits are billed at different rates depending on your insurance coverage.  Please reach out to your insurance provider with any questions.    If during the course of the call the physician/provider feels a video visit is not appropriate, you will not be charged for this service.\"    Patient has given verbal consent for Video visit? Yes  How would you like to obtain your AVS? MyChart  If you are dropped from the video visit, the video invite should be resent to: Send to e-mail at: [email or web address]  Will anyone else be joining your video visit? No        Video-Visit Details    Type of service:  Video Visit          I have reviewed and updated the patient's allergies and medication list. Patient was asked if they had any patient reported vital signs to present, if yes, please see documented vitals.  Patient was also asked for their current weight and height, if presented, documented in vitals.    Concerns: None    Refills: Pt needs refill of Tamoxafin.     Dona Chiu CMA          "

## 2021-01-03 ENCOUNTER — HEALTH MAINTENANCE LETTER (OUTPATIENT)
Age: 56
End: 2021-01-03

## 2021-02-02 ENCOUNTER — PATIENT OUTREACH (OUTPATIENT)
Dept: ONCOLOGY | Facility: CLINIC | Age: 56
End: 2021-02-02

## 2021-02-02 ENCOUNTER — MYC MEDICAL ADVICE (OUTPATIENT)
Dept: ONCOLOGY | Facility: CLINIC | Age: 56
End: 2021-02-02

## 2021-02-02 DIAGNOSIS — R05.9 COUGH: ICD-10-CM

## 2021-02-02 DIAGNOSIS — Z17.0 MALIGNANT NEOPLASM OF UPPER-OUTER QUADRANT OF RIGHT BREAST IN FEMALE, ESTROGEN RECEPTOR POSITIVE (H): Primary | ICD-10-CM

## 2021-02-02 DIAGNOSIS — C50.411 MALIGNANT NEOPLASM OF UPPER-OUTER QUADRANT OF RIGHT BREAST IN FEMALE, ESTROGEN RECEPTOR POSITIVE (H): Primary | ICD-10-CM

## 2021-02-02 NOTE — PROGRESS NOTES
RN CARE COORDINATION NOTE      Outgoing: Called patient to discuss her symptoms sent via [employer].   Left voicemail message asking for a return call to triage to discuss her symptoms.     Yumiko Cruz MSN, RN, OCN  RN Care Coordinator  [facility]  [phone]

## 2021-02-02 NOTE — TELEPHONE ENCOUNTER
"Pt called back to discuss symptomatic mychart;     Stated 2 weeks ago she noticed worsening cough; thinks in the winter time it's always a little worse and she increases use of prn albuterol inhaler, antihistamine chlorpherniramine 4 mg, decongestant pseudoephedrine 30 mg and nasal spray. She has felt \"dependent\" on these meds and using daily for 2 weeks, max use of inhaler and meds are twice a day.     She takes a daily walk of 3-4 miles and noticed HR is around 150 when walking, this is per her Apple watch, she is unsure what resting HR is. State cough is sometimes productive of clear phlegm and feels she does have chest tightness during coughing fits, none at rest. Denied any fevers, chills, chest pain, new pins or other symptoms. Chest feels congested and has sob doing stairs.    Had a follow-up visit with Astrid KUHN sched 2/25 but wondered if she needed to be seen sooner, get a covid test or cxray. She left a message for her primary care at [facility] but has not heard back yet. Paged Dr. Berrios.  "

## 2021-02-02 NOTE — TELEPHONE ENCOUNTER
Pt called back to schedule for labs/cxray at 1:30 pm, Mara Mcgill PAC at 2:40 pm for 2/3, scheduling messaged. Updated her on covid precautions, she will be a PUI under apt notes and to seek care if symptoms worsen tonight, she verbalized understanding.

## 2021-02-02 NOTE — TELEPHONE ENCOUNTER
Per Dr. Berrios: recommend Covid, cxray and CBC, CRP. If pt can get into pcp sooner they can order all these tests if not, can come to [facility] for in person HATTIE visit. Does not need to hold tamoxifen.    Called pt back with above information, she stated she just heard back from her primary and they did offer her an apt today but she is unsure if they can do covid, cxray or willing to do above labs there. She wants to check first and will call triage back. Informed her earliest apt at [facility] would be 2/3 with Mara Mcgill PAC at 2:40 pm, with labs and cxray she would need to be here by 1:30 pm, she verbalized understanding and will call back if she wants the apt, slot held.

## 2021-02-03 ENCOUNTER — ANCILLARY PROCEDURE (OUTPATIENT)
Dept: GENERAL RADIOLOGY | Facility: CLINIC | Age: 56
End: 2021-02-03
Attending: INTERNAL MEDICINE
Payer: COMMERCIAL

## 2021-02-03 ENCOUNTER — ONCOLOGY VISIT (OUTPATIENT)
Dept: ONCOLOGY | Facility: CLINIC | Age: 56
End: 2021-02-03
Attending: PHYSICIAN ASSISTANT
Payer: COMMERCIAL

## 2021-02-03 ENCOUNTER — ANCILLARY PROCEDURE (OUTPATIENT)
Dept: CT IMAGING | Facility: CLINIC | Age: 56
End: 2021-02-03
Attending: PHYSICIAN ASSISTANT
Payer: COMMERCIAL

## 2021-02-03 VITALS
DIASTOLIC BLOOD PRESSURE: 82 MMHG | OXYGEN SATURATION: 99 % | SYSTOLIC BLOOD PRESSURE: 129 MMHG | BODY MASS INDEX: 27.96 KG/M2 | WEIGHT: 163 LBS | RESPIRATION RATE: 16 BRPM | TEMPERATURE: 99.1 F | HEART RATE: 71 BPM

## 2021-02-03 DIAGNOSIS — M79.662 PAIN OF LEFT LOWER LEG: ICD-10-CM

## 2021-02-03 DIAGNOSIS — C50.411 MALIGNANT NEOPLASM OF UPPER-OUTER QUADRANT OF RIGHT BREAST IN FEMALE, ESTROGEN RECEPTOR POSITIVE (H): ICD-10-CM

## 2021-02-03 DIAGNOSIS — Z17.0 MALIGNANT NEOPLASM OF UPPER-OUTER QUADRANT OF RIGHT BREAST IN FEMALE, ESTROGEN RECEPTOR POSITIVE (H): ICD-10-CM

## 2021-02-03 DIAGNOSIS — R05.9 COUGH: ICD-10-CM

## 2021-02-03 DIAGNOSIS — R07.89 CHEST DISCOMFORT: ICD-10-CM

## 2021-02-03 DIAGNOSIS — R06.02 SHORTNESS OF BREATH: ICD-10-CM

## 2021-02-03 DIAGNOSIS — K21.9 GASTROESOPHAGEAL REFLUX DISEASE WITHOUT ESOPHAGITIS: ICD-10-CM

## 2021-02-03 DIAGNOSIS — R07.89 CHEST DISCOMFORT: Primary | ICD-10-CM

## 2021-02-03 LAB
ANION GAP SERPL CALCULATED.3IONS-SCNC: 8 MMOL/L (ref 3–14)
BASOPHILS # BLD AUTO: 0 10E9/L (ref 0–0.2)
BASOPHILS NFR BLD AUTO: 0.9 %
BUN SERPL-MCNC: 14 MG/DL (ref 7–30)
CALCIUM SERPL-MCNC: 8.8 MG/DL (ref 8.5–10.1)
CHLORIDE SERPL-SCNC: 109 MMOL/L (ref 94–109)
CO2 SERPL-SCNC: 26 MMOL/L (ref 20–32)
CREAT SERPL-MCNC: 0.92 MG/DL (ref 0.52–1.04)
CRP SERPL-MCNC: 4.3 MG/L (ref 0–8)
DIFFERENTIAL METHOD BLD: NORMAL
EOSINOPHIL # BLD AUTO: 0.1 10E9/L (ref 0–0.7)
EOSINOPHIL NFR BLD AUTO: 2.7 %
ERYTHROCYTE [DISTWIDTH] IN BLOOD BY AUTOMATED COUNT: 13.2 % (ref 10–15)
GFR SERPL CREATININE-BSD FRML MDRD: 71 ML/MIN/{1.73_M2}
GLUCOSE SERPL-MCNC: 120 MG/DL (ref 70–99)
HCT VFR BLD AUTO: 37.8 % (ref 35–47)
HGB BLD-MCNC: 12.3 G/DL (ref 11.7–15.7)
IMM GRANULOCYTES # BLD: 0 10E9/L (ref 0–0.4)
IMM GRANULOCYTES NFR BLD: 0.2 %
LYMPHOCYTES # BLD AUTO: 1.4 10E9/L (ref 0.8–5.3)
LYMPHOCYTES NFR BLD AUTO: 31.3 %
MCH RBC QN AUTO: 31.7 PG (ref 26.5–33)
MCHC RBC AUTO-ENTMCNC: 32.5 G/DL (ref 31.5–36.5)
MCV RBC AUTO: 97 FL (ref 78–100)
MONOCYTES # BLD AUTO: 0.4 10E9/L (ref 0–1.3)
MONOCYTES NFR BLD AUTO: 8 %
NEUTROPHILS # BLD AUTO: 2.6 10E9/L (ref 1.6–8.3)
NEUTROPHILS NFR BLD AUTO: 56.9 %
NRBC # BLD AUTO: 0 10*3/UL
NRBC BLD AUTO-RTO: 0 /100
PLATELET # BLD AUTO: 193 10E9/L (ref 150–450)
POTASSIUM SERPL-SCNC: 3.8 MMOL/L (ref 3.4–5.3)
RBC # BLD AUTO: 3.88 10E12/L (ref 3.8–5.2)
SODIUM SERPL-SCNC: 142 MMOL/L (ref 133–144)
WBC # BLD AUTO: 4.5 10E9/L (ref 4–11)

## 2021-02-03 PROCEDURE — 93005 ELECTROCARDIOGRAM TRACING: CPT

## 2021-02-03 PROCEDURE — 71046 X-RAY EXAM CHEST 2 VIEWS: CPT | Mod: GC | Performed by: RADIOLOGY

## 2021-02-03 PROCEDURE — 86140 C-REACTIVE PROTEIN: CPT | Performed by: PATHOLOGY

## 2021-02-03 PROCEDURE — 99215 OFFICE O/P EST HI 40 MIN: CPT | Performed by: PHYSICIAN ASSISTANT

## 2021-02-03 PROCEDURE — U0003 INFECTIOUS AGENT DETECTION BY NUCLEIC ACID (DNA OR RNA); SEVERE ACUTE RESPIRATORY SYNDROME CORONAVIRUS 2 (SARS-COV-2) (CORONAVIRUS DISEASE [COVID-19]), AMPLIFIED PROBE TECHNIQUE, MAKING USE OF HIGH THROUGHPUT TECHNOLOGIES AS DESCRIBED BY CMS-2020-01-R: HCPCS | Performed by: PHYSICIAN ASSISTANT

## 2021-02-03 PROCEDURE — 36415 COLL VENOUS BLD VENIPUNCTURE: CPT | Performed by: PATHOLOGY

## 2021-02-03 PROCEDURE — 93010 ELECTROCARDIOGRAM REPORT: CPT | Performed by: INTERNAL MEDICINE

## 2021-02-03 PROCEDURE — U0005 INFEC AGEN DETEC AMPLI PROBE: HCPCS | Performed by: PHYSICIAN ASSISTANT

## 2021-02-03 PROCEDURE — 85025 COMPLETE CBC W/AUTO DIFF WBC: CPT | Performed by: PATHOLOGY

## 2021-02-03 PROCEDURE — 80048 BASIC METABOLIC PNL TOTAL CA: CPT | Performed by: PATHOLOGY

## 2021-02-03 PROCEDURE — 71275 CT ANGIOGRAPHY CHEST: CPT | Performed by: RADIOLOGY

## 2021-02-03 RX ORDER — IOPAMIDOL 755 MG/ML
58 INJECTION, SOLUTION INTRAVASCULAR ONCE
Status: COMPLETED | OUTPATIENT
Start: 2021-02-03 | End: 2021-02-03

## 2021-02-03 RX ORDER — BENZONATATE 100 MG/1
100 CAPSULE ORAL 3 TIMES DAILY PRN
Qty: 90 CAPSULE | Refills: 0 | Status: SHIPPED | OUTPATIENT
Start: 2021-02-03 | End: 2022-10-24

## 2021-02-03 RX ORDER — FAMOTIDINE 10 MG
10 TABLET ORAL 2 TIMES DAILY
COMMUNITY

## 2021-02-03 RX ADMIN — IOPAMIDOL 58 ML: 755 INJECTION, SOLUTION INTRAVASCULAR at 16:16

## 2021-02-03 ASSESSMENT — PAIN SCALES - GENERAL: PAINLEVEL: NO PAIN (0)

## 2021-02-03 NOTE — PROGRESS NOTES
Oncology/Hematology Visit Note  Feb 3, 2021    Reason for Visit: Follow up of Invasive mammary carcinoma of the right breast.     History of Present Illness:   Ms. Villarreal is a 54 year old female who presents with diagnosis of invasive mammary carcinoma involving the right breast. She underwent right sided wire-localized segmental mastectomy and sentinel lymph node biopsy on 10/24/18 with Dr. Kalina Oviedo. T1c(2)N0.  Her OncoType score was 13 and thus no adjuvant chemotherapy was recommended. She completed adjuvant radiation therapy at [facility] with Dr. Blevins and is here today to discuss ongoing care.      Treatment to date:  1. Right wire-localized segmental mastectomy and sentinel lymph node biopsy (10/24/2018) T1c(2)N0  2. Oncotype DX 13  3. Adjuvant radiation therapy with Dr Blevins (12/5/2018 to 12/28/2018)  4. Adjuvant tamoxifen (2/4/2019 to ongoing)     Interval History:  Gabriella Villarreal was met with for evaluation of progressive cough, shortness of breath, chest discomfort.  She states that she has a history of a chronic dry cough, though over the past 2 weeks she noticed that this is getting progressively worse.  She describes it as a mixed dry and wet cough.  She is also noticing a dramatic increase in shortness of breath on exertion.  She is no longer able to climb stairs without feeling short of breath.  Reports that she wears an apple watch and has found that her heart rate is more elevated and she notices some palpitations when she is exerting.  She also describes a sensation of chest tightness intermittently in the past few weeks.  She has been using Robitussin to see if that can help with the chest tightness.  Denies any fevers, chills, loss of taste or smell, body aches, dramatic increase in fatigue, persistent sneezing, worsening abdominal pain, change in bowels.  No other new medication changes.  She does report that she has been having some intermittent left calf discomfort.  She has been  contributing this to an increase in activity overall.  Denies any swelling.  No known clotting history.  Denies any overt pleuritic chest pain.  Denies orthopnea and in fact feels that her cough improves when laying flat.       ROS: 10 point ROS neg other than the symptoms noted above in the HPI.      Current Outpatient Medications   Medication Sig Dispense Refill     ACETAMINOPHEN PO Take 1,000 mg by mouth       albuterol (PROAIR HFA/PROVENTIL HFA/VENTOLIN HFA) 108 (90 Base) MCG/ACT inhaler Inhale 2-4 puffs into the lungs       chlorpheniramine (CHLOR-TRIMETON) 4 MG tablet Take 4 mg by mouth        fluticasone (FLONASE) 50 MCG/ACT spray INSTILL 2 SPRAYS INTO BOTH NOSTRILS DAILY.       ibuprofen (ADVIL/MOTRIN) 200 MG tablet Take 200 mg by mouth       levothyroxine (SYNTHROID/LEVOTHROID) 100 MCG tablet Take 100 mcg by mouth every morning        pseudoePHEDrine (SUDAFED) 30 MG tablet Take 30 mg by mouth every 4 hours as needed for congestion       ranitidine (ZANTAC) 150 MG tablet Take 150 mg by mouth 2 times daily  60 tablet 1     tamoxifen (NOLVADEX) 20 MG tablet Take 1 tablet (20 mg) by mouth daily 90 tablet 3       Physical Examination:  There were no vitals taken for this visit.  Wt Readings from Last 10 Encounters:   07/29/19 78.8 kg (173 lb 12.8 oz)   05/01/19 81.3 kg (179 lb 4.8 oz)   02/08/19 82.5 kg (181 lb 12.8 oz)   01/28/19 82.6 kg (182 lb 1.6 oz)   11/19/18 82.5 kg (181 lb 14.4 oz)   11/06/18 80 kg (176 lb 6 oz)   10/27/18 81.4 kg (179 lb 8 oz)   10/24/18 82.5 kg (181 lb 14.4 oz)   10/19/18 82.4 kg (181 lb 9.6 oz)   09/28/18 77.6 kg (171 lb)     Constitutional: Well-appearing female in no acute distress.   Eyes: EOMI, PERRL. No scleral icterus.  ENT: Oral mucosa is moist without lesions or thrush.   Cardiovascular: Regular rate and rhythm. No murmurs, gallops, or rubs. No peripheral edema.  Respiratory: Clear to auscultation bilaterally. No wheezes or crackles.  Gastrointestinal: Bowel sounds present.  Abdomen soft, non-tender. No palpable hepatosplenomegaly or masses.   Neurologic: Cranial nerves II through XII are grossly intact.  Skin: No rashes, petechiae, or bruising noted on exposed skin.    Laboratory Data:  Results for VANESSA JESUS (MRN [id number]) as of 2/3/2021 15:36   Ref. Range 2/3/2021 13:55   CRP Inflammation Latest Ref Range: 0.0 - 8.0 mg/L 4.3   WBC Latest Ref Range: 4.0 - 11.0 10e9/L 4.5   Hemoglobin Latest Ref Range: 11.7 - 15.7 g/dL 12.3   Hematocrit Latest Ref Range: 35.0 - 47.0 % 37.8   Platelet Count Latest Ref Range: 150 - 450 10e9/L 193   RBC Count Latest Ref Range: 3.8 - 5.2 10e12/L 3.88   MCV Latest Ref Range: 78 - 100 fl 97   MCH Latest Ref Range: 26.5 - 33.0 pg 31.7   MCHC Latest Ref Range: 31.5 - 36.5 g/dL 32.5   RDW Latest Ref Range: 10.0 - 15.0 % 13.2   Diff Method Unknown Automated Method   % Neutrophils Latest Units: % 56.9   % Lymphocytes Latest Units: % 31.3   % Monocytes Latest Units: % 8.0   % Eosinophils Latest Units: % 2.7   % Basophils Latest Units: % 0.9   % Immature Granulocytes Latest Units: % 0.2   Nucleated RBCs Latest Ref Range: 0 /100 0   Absolute Neutrophil Latest Ref Range: 1.6 - 8.3 10e9/L 2.6   Absolute Lymphocytes Latest Ref Range: 0.8 - 5.3 10e9/L 1.4   Absolute Monocytes Latest Ref Range: 0.0 - 1.3 10e9/L 0.4   Absolute Eosinophils Latest Ref Range: 0.0 - 0.7 10e9/L 0.1   Absolute Basophils Latest Ref Range: 0.0 - 0.2 10e9/L 0.0   Abs Immature Granulocytes Latest Ref Range: 0 - 0.4 10e9/L 0.0   Absolute Nucleated RBC Unknown 0.0         Assessment and Plan:  #  Breast cancer - ER+/DC+ and Her2 non-amplified (pT1c(2)N0). OncoTypeDX score is 13, indicating low-risk of distant recurrence and no additional benefit of chemotherapy. With her hormone-receptor positive disease, and perimenopausal status we would continue with adjuvant endocrine therapy with Tamoxifen 20mg daily       # Bone Health - Dexa scan T score -1.3.  She is on tamoxifen    # Progressive cough,  shortness of breath, chest discomfort  -CXR unremarkable.  EKG today was obtained and was unremarkable.    -Advised to take Pepcid daily in case an increase in acid reflux is contributing to the cough, though this would not explain her progressive shortness of breath.  -Ordered CT pulmonary embolism study of chest to given concurrent left leg discomfort and increased clotting risk while on tamoxifen, thankfully this was negative.   -Wll prescribe Tessalon to see if this helps with controlling her dry cough.  -We will test for Covid.  IF negative, may be an otherwise mild URI. Aware to seek medical attention if symptoms worsen.     # Left leg pain  -CT pulmonary embolism study is negative for PE, will send for Doppler of left lower extremity.    Gabriella Villarreal's questions were answered/addressed and she to contact the clinic if any of the aforementioned symptoms worsen/change or if new concerning symptoms arise.     40 minutes spent on the date of the encounter doing chart review, review of test results, interpretation of tests, patient visit and documentation       Mara Mcgill PA-C  [facility]  [address], [place] [id number] [phone]

## 2021-02-04 LAB
INTERPRETATION ECG - MUSE: NORMAL
LABORATORY COMMENT REPORT: NORMAL
SARS-COV-2 RNA RESP QL NAA+PROBE: NEGATIVE
SARS-COV-2 RNA RESP QL NAA+PROBE: NORMAL
SPECIMEN SOURCE: NORMAL
SPECIMEN SOURCE: NORMAL

## 2021-02-05 ENCOUNTER — ANCILLARY PROCEDURE (OUTPATIENT)
Dept: ULTRASOUND IMAGING | Facility: CLINIC | Age: 56
End: 2021-02-05
Attending: PHYSICIAN ASSISTANT
Payer: COMMERCIAL

## 2021-02-05 DIAGNOSIS — M79.662 PAIN OF LEFT LOWER LEG: ICD-10-CM

## 2021-02-05 PROCEDURE — 93971 EXTREMITY STUDY: CPT | Mod: LT | Performed by: RADIOLOGY

## 2021-03-01 ENCOUNTER — VIRTUAL VISIT (OUTPATIENT)
Dept: ONCOLOGY | Facility: CLINIC | Age: 56
End: 2021-03-01
Attending: PHYSICIAN ASSISTANT
Payer: COMMERCIAL

## 2021-03-01 DIAGNOSIS — Z17.0 MALIGNANT NEOPLASM OF UPPER-OUTER QUADRANT OF RIGHT BREAST IN FEMALE, ESTROGEN RECEPTOR POSITIVE (H): Primary | ICD-10-CM

## 2021-03-01 DIAGNOSIS — C50.411 MALIGNANT NEOPLASM OF UPPER-OUTER QUADRANT OF RIGHT BREAST IN FEMALE, ESTROGEN RECEPTOR POSITIVE (H): Primary | ICD-10-CM

## 2021-03-01 PROCEDURE — 99214 OFFICE O/P EST MOD 30 MIN: CPT | Mod: TEL | Performed by: PHYSICIAN ASSISTANT

## 2021-03-01 PROCEDURE — 999N001193 HC VIDEO/TELEPHONE VISIT; NO CHARGE

## 2021-03-01 NOTE — PROGRESS NOTES
"Lauren is a 55 year old who is being evaluated via a billable telephone visit.      What phone number would you like to be contacted at? [phone]  How would you like to obtain your AVS? Rashaun   Vitals - Patient Reported  Weight (Patient Reported): 72.6 kg (160 lb)  Height (Patient Reported): 162.6 cm (5' 4\")  BMI (Based on Pt Reported Ht/Wt): 27.46  Pain Score: No Pain (0)    Phone call duration: 30 minutes    Carmen Moy MA      Oncology Visit  Date on this visit: March 1st, 2021     Diagnosis: Invasive mammary carcinoma of the right breast.     Treatment to date:  1. Right wire-localized segmental mastectomy and sentinel lymph node biopsy (10/24/2018) T1c(2)N0  2. Oncotype DX 13  3. Adjuvant radiation therapy with Dr Blevins (12/5/2018 to 12/28/2018)  4. Adjuvant tamoxifen (2/4/2019 to ongoing)    Interval History:   Lauren reports she has been feeling well.   She was seen one month ago and had expressed worsening of her chronic cough and some pain in her left lower leg. This was worked up for possible DVT and/or PE and essentially negative. Some atelectasis noted on the Chest CT, no DVT.   Since this time left lower leg pain resolved - had pulled a muscle while walking.   No difficulty breathing, cough is back to baseline. She has a chronic cough - per patient this has been worked up. Taking allergy medication to help.   Walking 3-4 miles per day near a trail by her house.   No special diet, watching what she eats.   LMP: June 2020. Reports no bleeding since this time.     Denies vision changes, shortness of breath, chest pain, nausea, vomiting, or skin changes.     Past Medical History:   Diagnosis Date     Allergic rhinitis      Chronic cough      GERD (gastroesophageal reflux disease)      Hypothyroidism      Past Surgical History:   Procedure Laterality Date     CHOLECYSTECTOMY       L salpingooophorectomy  2015     LUMPECTOMY BREAST WITH SENTINEL NODE, COMBINED Right 10/24/2018    Procedure: Right " "Wire Localized Segmental Mastectomy (Lumpectomy), Right [place] Lymph Node Biopsy;  Surgeon: Kalina Oviedo MD;  Location: [facility] OR        Menstrual History:   Menarche around age 15.  Three pregnancies, uncomplicated.   the first two children.  Was not on OCPs until her mid-40s, at which time she started \"low-dose\" combined OCPs for painful periods.  LMP June 2020 - the past prior months periods had been heavier and more sporadic. No vaginal bleeding.    OBJECTIVE     Physical Exam  General: patient sounds in no audible acute distress, alert and oriented, speech clear and fluid  Resp: Speaking in full sentences, no audible respiratory distress, no cough, no audible wheeze  Psych: able to articulate logical thoughts, able to abstract reason, no tangential thoughts, no hallucinations or delusions. Affect is normal    Labs/Imaging: None today.     ASSESSMENT/PLAN:  Gabriella Villarreal is a 55 year old woman with recently diagnosed Stage IA invasive mammary carcinoma involving the right breast, ER+/SC+ and Her2 non-amplified (pT1c(2)N0). She underwent wire-localized right-sided segmental mastectomy with Dr. Kalina Oviedo on 10/24/18. Her course was complicated by an intensely pruritic rash, which started in the right axilla and spread to the right breast, up to the neck and into the upper right abdomen. This was diagnosed as acute contact dermatitis and resolved with a course of steroids. she has been on Tamoxifen for adjuvant endocrine therapy since (2/4/19).      #  Breast cancer - ER+/SC+ and Her2 non-amplified (pT1c(2)N0). OncoTypeDX score is 13, indicating low-risk of distant recurrence and no additional benefit of chemotherapy. With her hormone-receptor positive disease, and perimenopausal status we would continue with adjuvant endocrine therapy with Tamoxifen 20mg daily. She is tolerating this well.   Could potentially switch to aromatase inhibitor June 2021, if she continues to not have menses. Briefly " discussed benefits of staying on tamoxifen vs. switching to aromatase inhibitor at this time.   Annual mammogram - next due in August 2021.      # Bone Health - Dexa scan T score -1.3 in May 2019. Currently on tamoxifen. Plan for next Dexa in 2022.      # Lifestyle - Encourage regular exercise and healthy diet, for which she is doing. She continues to try and lose weight. She is walking 3-4 miles daily, increasing intensity of exercise can help with cardiovascular and lung health. She does not drink alcohol, does not smoke.       Follow up with Dr. Berrios scheduled for August 2021.     Alexandra Viramontes, [employer] student   I saw patient and performed the ROS and exam myself.  The assessment and plan were mutually discussed and this note was edited to reflect my findings.  Andreia Kc PA-C

## 2021-04-25 ENCOUNTER — HEALTH MAINTENANCE LETTER (OUTPATIENT)
Age: 56
End: 2021-04-25

## 2021-08-05 ENCOUNTER — ANCILLARY PROCEDURE (OUTPATIENT)
Dept: MAMMOGRAPHY | Facility: CLINIC | Age: 56
End: 2021-08-05
Attending: PHYSICIAN ASSISTANT
Payer: COMMERCIAL

## 2021-08-05 ENCOUNTER — ONCOLOGY VISIT (OUTPATIENT)
Dept: ONCOLOGY | Facility: CLINIC | Age: 56
End: 2021-08-05
Attending: INTERNAL MEDICINE
Payer: COMMERCIAL

## 2021-08-05 VITALS
TEMPERATURE: 98.5 F | HEART RATE: 70 BPM | WEIGHT: 169.1 LBS | BODY MASS INDEX: 29.01 KG/M2 | SYSTOLIC BLOOD PRESSURE: 120 MMHG | DIASTOLIC BLOOD PRESSURE: 72 MMHG | OXYGEN SATURATION: 99 % | RESPIRATION RATE: 12 BRPM

## 2021-08-05 DIAGNOSIS — N95.1 PERIMENOPAUSAL: ICD-10-CM

## 2021-08-05 DIAGNOSIS — C50.411 MALIGNANT NEOPLASM OF UPPER-OUTER QUADRANT OF RIGHT BREAST IN FEMALE, ESTROGEN RECEPTOR POSITIVE (H): Primary | ICD-10-CM

## 2021-08-05 DIAGNOSIS — C50.411 MALIGNANT NEOPLASM OF UPPER-OUTER QUADRANT OF RIGHT BREAST IN FEMALE, ESTROGEN RECEPTOR POSITIVE (H): ICD-10-CM

## 2021-08-05 DIAGNOSIS — Z17.0 MALIGNANT NEOPLASM OF UPPER-OUTER QUADRANT OF RIGHT BREAST IN FEMALE, ESTROGEN RECEPTOR POSITIVE (H): Primary | ICD-10-CM

## 2021-08-05 DIAGNOSIS — Z79.811 USE OF AROMATASE INHIBITORS: ICD-10-CM

## 2021-08-05 DIAGNOSIS — Z17.0 MALIGNANT NEOPLASM OF UPPER-OUTER QUADRANT OF RIGHT BREAST IN FEMALE, ESTROGEN RECEPTOR POSITIVE (H): ICD-10-CM

## 2021-08-05 LAB — FSH SERPL-ACNC: 28.2 IU/L

## 2021-08-05 PROCEDURE — 77067 SCR MAMMO BI INCL CAD: CPT | Mod: GC

## 2021-08-05 PROCEDURE — 83001 ASSAY OF GONADOTROPIN (FSH): CPT | Performed by: INTERNAL MEDICINE

## 2021-08-05 PROCEDURE — 77063 BREAST TOMOSYNTHESIS BI: CPT | Mod: GC

## 2021-08-05 PROCEDURE — 36415 COLL VENOUS BLD VENIPUNCTURE: CPT | Performed by: INTERNAL MEDICINE

## 2021-08-05 PROCEDURE — G0463 HOSPITAL OUTPT CLINIC VISIT: HCPCS

## 2021-08-05 PROCEDURE — 99214 OFFICE O/P EST MOD 30 MIN: CPT | Performed by: INTERNAL MEDICINE

## 2021-08-05 PROCEDURE — 82670 ASSAY OF TOTAL ESTRADIOL: CPT | Performed by: INTERNAL MEDICINE

## 2021-08-05 ASSESSMENT — PAIN SCALES - GENERAL: PAINLEVEL: NO PAIN (0)

## 2021-08-05 NOTE — NURSING NOTE
Venipuncture blood draw done on patients Left ac. Patient tolerated well without any complications. 21G needle used. See flowsheets      Carmen Moy MA

## 2021-08-05 NOTE — LETTER
8/5/2021         RE: Gabriella Villarreal  [address]  [facility] [address]        Dear Colleague,    Thank you for referring your patient, Gabriella Villarreal, to the [facility]. Please see a copy of my visit note below.    Oncology Visit  Date on this visit: 08/05/2021      Diagnosis: Invasive mammary carcinoma of the right breast.     Treatment to date:  1. Right wire-localized segmental mastectomy and sentinel lymph node biopsy (10/24/2018) T1c(2)N0  2. Oncotype DX 13  3. Adjuvant radiation therapy with Dr Blevins (12/5/2018 to 12/28/2018)  4. Adjuvant tamoxifen (2/4/2019 to ongoing)    Interval History:   Lauren returns to clinic for follow-up of breast cancer. She is on adjuvant tamoxifen. LMP was in July 2021. Previously she did not have periods since August 2020. She feels well overall. She has occasional hot flashes. No vaginal dryness, abnormal vaginal bleeding, muscle or joint pain. She denies lower extremity swelling or sob. She was worked up for possible DVT/PE recently which was negative. She walks about 4 miles daily. Mood and appetite are good. No history of headache, vision changes, lightheadedness, sob, cough, chest pain, n/v/d, bowel or bladder changes, abdominal pain, fatigue, appetite changes, fever or chills.     Past Medical History:   Diagnosis Date     Allergic rhinitis      Chronic cough      GERD (gastroesophageal reflux disease)      Hypothyroidism      Past Surgical History:   Procedure Laterality Date     CHOLECYSTECTOMY       L salpingooophorectomy  2015     LUMPECTOMY BREAST WITH SENTINEL NODE, COMBINED Right 10/24/2018    Procedure: Right Wire Localized Segmental Mastectomy (Lumpectomy), Right [place] Lymph Node Biopsy;  Surgeon: Kalina Oviedo MD;  Location:  OR        Menstrual History:   Menarche around age 15.  Three pregnancies, uncomplicated.   the first two children.  Was not on OCPs until her mid-40s, at which time she  "started \"low-dose\" combined OCPs for painful periods.  LMP June 2020 - the past prior months periods had been heavier and more sporadic. No vaginal bleeding.    OBJECTIVE     Physical Exam  Wt 76.7 kg (169 lb 1.6 oz)   BMI 29.01 kg/m     GENERAL:  Alert, no acute distress  HEAD/NECK:  PERRL, EOMI, no scleral icterus  LYMPH NODES: No palpable cervical, axillary, abdominal or inguinal lymph nodes  BREAST: Well healed right segmental mastectomy scar. No palpable lumps on b/l breast exam.   LUNGS:  Clear to auscultation b/l, no wheezes or crackles  CARDIOVASCULAR:  Regular rate and rhythm, normal S1, S2 without rub, gallop or murmur  EXTREMITIES:  No lower extremity edema.   NEURO: CN 3-12 grossly intact, equal motor strength in major muscle groups of all four extremities  SKIN: No rashes  PSYCH: Normal affect     Labs/Imaging: Mammogram completed today. Report pending.     ASSESSMENT/PLAN:  Gabriella Villarreal is a 55 year old woman with recently diagnosed Stage IA invasive mammary carcinoma involving the right breast, ER+/NH+ and Her2 non-amplified (pT1c(2)N0). She underwent wire-localized right-sided segmental mastectomy with Dr. Kalina Oviedo on 10/24/18. Her course was complicated by an intensely pruritic rash, which started in the right axilla and spread to the right breast, up to the neck and into the upper right abdomen. This was diagnosed as acute contact dermatitis and resolved with a course of steroids. She has been on Tamoxifen for adjuvant endocrine therapy since (2/4/19).      #  Breast cancer - ER+/NH+ and Her2 non-amplified (pT1c(2)N0). OncoTypeDX score is 13, indicating low-risk of distant recurrence and no additional benefit of chemotherapy. With her hormone-receptor positive disease, and perimenopausal status we would continue with adjuvant endocrine therapy with Tamoxifen 20mg daily. She is tolerating this well.   Last period was 7/2021. She is due for a pelvic exam and will follow-up with gynecology. FSH " and estradiol levels were ordered. Consider switching to aromatase inhibitor once she is menopausal (one year without menses). Annual mammogram - next due in August 2022.      # Bone Health - Dexa scan T score -1.3 in May 2019. Currently on tamoxifen. Due for DEXA scan.      # Lifestyle - Encourage regular exercise and healthy diet, for which she is doing. She continues to try and lose weight. She is walking 3-4 miles daily, increasing intensity of exercise can help with cardiovascular and lung health. She does not drink alcohol, does not smoke.       Follow-up: RTC in 6 months    Patient was seen and plan discussed with attending physician, Dr. Berrios.    Cassia Johnson  Hematology, Oncology & Transplantation fellow  Pager: [phone]  08/05/2021         Addendum:  The patient was seen and evaluated by me with Dr Johnson.  Overall, Lauren is doing well.  She is tolerating her tamoxifen well.  She is an avid kayaker and has been now busy doing physical exercise over the course of the summer.  She had not had any menstrual periods from 06/2020 until just this past month when she had 1 regular menstrual period.  She had her mammogram.  She is waiting for these results.    PHYSICAL EXAMINATION:  There are no nodules or masses in either breast.  She has no lymphedema.  No axillary adenopathy.    I reviewed with her that I would like her to continue the tamoxifen.  We will plan to check hormone levels today to determine whether or not she is really postmenopausal or whether or not bleeding occurred while on tamoxifen.  If she is postmenopausal, I would likely recommend a pelvic ultrasound for evaluation of endometrial hyperplasia.    We did discuss that once she is postmenopausal, she will need a repeat bone density scan.  I would also consider switching her to an aromatase inhibitor at that time.      PLAN:    1.  Check laboratories today for estradiol and FSH.  2.  DEXA scan with followup appointment.  3.  We will call  her with the results of her hormone therapies to determine whether to remain on tamoxifen or switch to an AI.  4.  She is going to follow up with Gynecology.              Again, thank you for allowing me to participate in the care of your patient.        Sincerely,        Karen Berrios MD

## 2021-08-05 NOTE — PROGRESS NOTES
"Oncology Visit  Date on this visit: 08/05/2021      Diagnosis: Invasive mammary carcinoma of the right breast.     Treatment to date:  1. Right wire-localized segmental mastectomy and sentinel lymph node biopsy (10/24/2018) T1c(2)N0  2. Oncotype DX 13  3. Adjuvant radiation therapy with Dr Blevins (12/5/2018 to 12/28/2018)  4. Adjuvant tamoxifen (2/4/2019 to ongoing)    Interval History:   Lauren returns to clinic for follow-up of breast cancer. She is on adjuvant tamoxifen. LMP was in July 2021. Previously she did not have periods since August 2020. She feels well overall. She has occasional hot flashes. No vaginal dryness, abnormal vaginal bleeding, muscle or joint pain. She denies lower extremity swelling or sob. She was worked up for possible DVT/PE recently which was negative. She walks about 4 miles daily. Mood and appetite are good. No history of headache, vision changes, lightheadedness, sob, cough, chest pain, n/v/d, bowel or bladder changes, abdominal pain, fatigue, appetite changes, fever or chills.     Past Medical History:   Diagnosis Date     Allergic rhinitis      Chronic cough      GERD (gastroesophageal reflux disease)      Hypothyroidism      Past Surgical History:   Procedure Laterality Date     CHOLECYSTECTOMY       L salpingooophorectomy  2015     LUMPECTOMY BREAST WITH SENTINEL NODE, COMBINED Right 10/24/2018    Procedure: Right Wire Localized Segmental Mastectomy (Lumpectomy), Right [place] Lymph Node Biopsy;  Surgeon: Kalina Oviedo MD;  Location:  OR        Menstrual History:   Menarche around age 15.  Three pregnancies, uncomplicated.   the first two children.  Was not on OCPs until her mid-40s, at which time she started \"low-dose\" combined OCPs for painful periods.  LMP June 2020 - the past prior months periods had been heavier and more sporadic. No vaginal bleeding.    OBJECTIVE     Physical Exam  Wt 76.7 kg (169 lb 1.6 oz)   BMI 29.01 kg/m     GENERAL:  Alert, no acute " distress  HEAD/NECK:  PERRL, EOMI, no scleral icterus  LYMPH NODES: No palpable cervical, axillary, abdominal or inguinal lymph nodes  BREAST: Well healed right segmental mastectomy scar. No palpable lumps on b/l breast exam.   LUNGS:  Clear to auscultation b/l, no wheezes or crackles  CARDIOVASCULAR:  Regular rate and rhythm, normal S1, S2 without rub, gallop or murmur  EXTREMITIES:  No lower extremity edema.   NEURO: CN 3-12 grossly intact, equal motor strength in major muscle groups of all four extremities  SKIN: No rashes  PSYCH: Normal affect     Labs/Imaging: Mammogram completed today. Report pending.     ASSESSMENT/PLAN:  Gabriella Villarreal is a 55 year old woman with recently diagnosed Stage IA invasive mammary carcinoma involving the right breast, ER+/PA+ and Her2 non-amplified (pT1c(2)N0). She underwent wire-localized right-sided segmental mastectomy with Dr. Kalina Oviedo on 10/24/18. Her course was complicated by an intensely pruritic rash, which started in the right axilla and spread to the right breast, up to the neck and into the upper right abdomen. This was diagnosed as acute contact dermatitis and resolved with a course of steroids. She has been on Tamoxifen for adjuvant endocrine therapy since (2/4/19).      #  Breast cancer - ER+/PA+ and Her2 non-amplified (pT1c(2)N0). OncoTypeDX score is 13, indicating low-risk of distant recurrence and no additional benefit of chemotherapy. With her hormone-receptor positive disease, and perimenopausal status we would continue with adjuvant endocrine therapy with Tamoxifen 20mg daily. She is tolerating this well.   Last period was 7/2021. She is due for a pelvic exam and will follow-up with gynecology. FSH and estradiol levels were ordered. Consider switching to aromatase inhibitor once she is menopausal (one year without menses). Annual mammogram - next due in August 2022.      # Bone Health - Dexa scan T score -1.3 in May 2019. Currently on tamoxifen. Due for DEXA  scan.      # Lifestyle - Encourage regular exercise and healthy diet, for which she is doing. She continues to try and lose weight. She is walking 3-4 miles daily, increasing intensity of exercise can help with cardiovascular and lung health. She does not drink alcohol, does not smoke.       Follow-up: RTC in 6 months    Patient was seen and plan discussed with attending physician, Dr. Berrios.    Cassia Johnson  Hematology, Oncology & Transplantation fellow  Pager: [phone]  08/05/2021         Addendum:  The patient was seen and evaluated by me with Dr Johnson.  Overall, Lauren is doing well.  She is tolerating her tamoxifen well.  She is an avid kayaker and has been now busy doing physical exercise over the course of the summer.  She had not had any menstrual periods from 06/2020 until just this past month when she had 1 regular menstrual period.  She had her mammogram.  She is waiting for these results.    PHYSICAL EXAMINATION:  There are no nodules or masses in either breast.  She has no lymphedema.  No axillary adenopathy.    I reviewed with her that I would like her to continue the tamoxifen.  We will plan to check hormone levels today to determine whether or not she is really postmenopausal or whether or not bleeding occurred while on tamoxifen.  If she is postmenopausal, I would likely recommend a pelvic ultrasound for evaluation of endometrial hyperplasia.    We did discuss that once she is postmenopausal, she will need a repeat bone density scan.  I would also consider switching her to an aromatase inhibitor at that time.      PLAN:    1.  Check laboratories today for estradiol and FSH.  2.  DEXA scan with followup appointment.  3.  We will call her with the results of her hormone therapies to determine whether to remain on tamoxifen or switch to an AI.  4.  She is going to follow up with Gynecology.

## 2021-08-05 NOTE — NURSING NOTE
"Oncology Rooming Note    August 5, 2021 9:46 AM   Gabriella Villarreal is a 55 year old female who presents for:    Chief Complaint   Patient presents with     Oncology Clinic Visit     Return; Breast Ca.      Initial Vitals: /72   Pulse 70   Temp 98.5  F (36.9  C) (Oral)   Resp 12   Wt 76.7 kg (169 lb 1.6 oz)   SpO2 99%   BMI 29.01 kg/m   Estimated body mass index is 29.01 kg/m  as calculated from the following:    Height as of 7/29/19: 1.626 m (5' 4.02\").    Weight as of this encounter: 76.7 kg (169 lb 1.6 oz). Body surface area is 1.86 meters squared.  No Pain (0) Comment: Data Unavailable   No LMP recorded. Patient is perimenopausal.  Allergies reviewed: Yes  Medications reviewed: Yes    Medications: MEDICATION REFILLS NEEDED TODAY. Provider was notified.  Pharmacy name entered into EPIC:    [facility] [place], [place] - [address] [place] [address] [address] [place] [address] DRUG STORE #[id number] [place] [address] AT [place] [address] [place], [place] - [facility] [address]  [facility] - [place], [place] - [address] AT PORTAL TO REGISTERED [facility] SITES    Clinical concerns: Need refills on Tamoxifen.      Jenna Lea,   (Student MA)      Patient's rooming completed by Margarito Lyle MA.      All steps completed per rooming protocol.    Dona Chiu CMA ([facility])        "

## 2021-08-10 LAB — ESTRADIOL SERPL HS-MCNC: 9 PG/ML

## 2021-08-31 DIAGNOSIS — Z17.0 MALIGNANT NEOPLASM OF UPPER-OUTER QUADRANT OF RIGHT BREAST IN FEMALE, ESTROGEN RECEPTOR POSITIVE (H): Primary | ICD-10-CM

## 2021-08-31 DIAGNOSIS — C50.411 MALIGNANT NEOPLASM OF UPPER-OUTER QUADRANT OF RIGHT BREAST IN FEMALE, ESTROGEN RECEPTOR POSITIVE (H): Primary | ICD-10-CM

## 2021-08-31 RX ORDER — LETROZOLE 2.5 MG/1
2.5 TABLET, FILM COATED ORAL DAILY
Qty: 90 TABLET | Refills: 3 | Status: SHIPPED | OUTPATIENT
Start: 2021-08-31 | End: 2022-08-15

## 2021-09-26 DIAGNOSIS — C50.411 MALIGNANT NEOPLASM OF UPPER-OUTER QUADRANT OF RIGHT BREAST IN FEMALE, ESTROGEN RECEPTOR POSITIVE (H): Primary | ICD-10-CM

## 2021-09-26 DIAGNOSIS — Z17.0 MALIGNANT NEOPLASM OF UPPER-OUTER QUADRANT OF RIGHT BREAST IN FEMALE, ESTROGEN RECEPTOR POSITIVE (H): Primary | ICD-10-CM

## 2021-09-26 DIAGNOSIS — Z79.811 USE OF AROMATASE INHIBITORS: ICD-10-CM

## 2021-10-10 ENCOUNTER — HEALTH MAINTENANCE LETTER (OUTPATIENT)
Age: 56
End: 2021-10-10

## 2021-12-03 ENCOUNTER — MYC MEDICAL ADVICE (OUTPATIENT)
Dept: ONCOLOGY | Facility: CLINIC | Age: 56
End: 2021-12-03
Payer: COMMERCIAL

## 2021-12-03 NOTE — TELEPHONE ENCOUNTER
[facility] Triage    Started Letrozole on December 1st and would like Dr. Berrios to know.    No longer taking Tamoxifen.  Took Tamoxifen right up until she started Letrozole.    Reviewed side effects of Letrozole from our [facility].    Routing Dr. Berrios and Yumiko Cruz

## 2022-02-07 ENCOUNTER — VIRTUAL VISIT (OUTPATIENT)
Dept: ONCOLOGY | Facility: CLINIC | Age: 57
End: 2022-02-07
Attending: PHYSICIAN ASSISTANT
Payer: COMMERCIAL

## 2022-02-07 DIAGNOSIS — Z79.811 USE OF AROMATASE INHIBITORS: Primary | ICD-10-CM

## 2022-02-07 DIAGNOSIS — Z17.0 MALIGNANT NEOPLASM OF UPPER-OUTER QUADRANT OF RIGHT BREAST IN FEMALE, ESTROGEN RECEPTOR POSITIVE (H): ICD-10-CM

## 2022-02-07 DIAGNOSIS — C50.411 MALIGNANT NEOPLASM OF UPPER-OUTER QUADRANT OF RIGHT BREAST IN FEMALE, ESTROGEN RECEPTOR POSITIVE (H): ICD-10-CM

## 2022-02-07 PROCEDURE — 99213 OFFICE O/P EST LOW 20 MIN: CPT | Mod: TEL | Performed by: PHYSICIAN ASSISTANT

## 2022-02-07 NOTE — LETTER
"    2/7/2022         RE: Gabriella Villarreal  [address]  [facility] [facility] [address]            Oncology Visit  Date on this visit: Feb 7, 2022       Diagnosis: Invasive mammary carcinoma of the right breast.      Treatment to date:  1. Right wire-localized segmental mastectomy and sentinel lymph node biopsy (10/24/2018) T1c(2)N0  2. Oncotype DX 13  3. Adjuvant radiation therapy with Dr Blevins (12/5/2018 to 12/28/2018)  4. Adjuvant tamoxifen (2/4/2019 to ongoing)  5. Dec 2021 started letrozole (post menopausal)     Interval History:   Lauren returns to clinic for follow-up of breast cancer, for phone visit today.  In December she stopped her Tamoxifen and started letrozole.  She had a foot surgery in October for a bunion thus delayed the transition.  She feels she is doing really well, no joint issues, less \"heat\" and good mood.  She has been walking daily and encouraged by a few pound weight loss.     No history of headache, vision changes, lightheadedness, sob, cough, chest pain, n/v/d, bowel or bladder changes, abdominal pain, fatigue, appetite changes, fever or chills.      Past Medical History        Past Medical History:   Diagnosis Date     Allergic rhinitis       Chronic cough       GERD (gastroesophageal reflux disease)       Hypothyroidism           Past Surgical History         Past Surgical History:   Procedure Laterality Date     CHOLECYSTECTOMY         L salpingooophorectomy   2015     LUMPECTOMY BREAST WITH SENTINEL NODE, COMBINED Right 10/24/2018     Procedure: Right Wire Localized Segmental Mastectomy (Lumpectomy), Right [place] Lymph Node Biopsy;  Surgeon: Kalina Oviedo MD;  Location:  OR            Menstrual History:   Menarche around age 15.  Three pregnancies, uncomplicated.   the first two children.  Was not on OCPs until her mid-40s, at which time she started \"low-dose\" combined OCPs for painful periods.  LMP June 2020 - the past prior months periods had been heavier and " more sporadic. No vaginal bleeding.          Physical Exam  Voice is clear and strong. No audible stridor, wheezing, or respiratory distress. The remainder of PE was deferred due to PHE.       Labs/Imaging: Mammogram WNL summer 2021.   Dexa previously was -1.3     ASSESSMENT/PLAN:  Gabriella Villarreal is a 56 year old woman with recently diagnosed Stage IA invasive mammary carcinoma involving the right breast, ER+/KY+ and Her2 non-amplified (pT1c(2)N0). She underwent wire-localized right-sided segmental mastectomy with Dr. Kalina Oviedo on 10/24/18. She started Tamoxifen for adjuvant endocrine therapy since (2/4/19) after radiation.  She now transitioned to letrozole due to being post menopausal.      #  Breast cancer - ER+/KY+ and Her2 non-amplified (pT1c(2)N0). OncoTypeDX score is 13, indicating low-risk of distant recurrence and no additional benefit of chemotherapy. She was on tamoxifen, but in December switched to letrozole.   She is tolerating this well without concerns.  She has routine PCP follow up.     She recently met with gynecology for exam and ultrasound, she has follow up with them next week.      We'll order a mammogram and in person visit for the summer.      # Bone Health - Dexa scan T score -1.3 in May 2019. Currently on tamoxifen. Due for DEXA scan.      # Lifestyle - Encourage regular exercise and healthy diet, for which she is doing. She continues to try and lose weight. She is walking 3-4 miles daily, increasing intensity of exercise can help with cardiovascular and lung health. She does not drink alcohol, does not smoke.       Andreia Kc PA-C       5 minutes spent on the date of the encounter doing chart review, review of outside records, review of test results and interpretation of tests, in addition to 19 minutes spent on the phone with the patient.

## 2022-02-07 NOTE — PROGRESS NOTES
"Lauren is a 56 year old who is being evaluated via a billable telephone visit.      What phone number would you like to be contacted at? [phone]  How would you like to obtain your AVS? Rezagregoria   Beckywilliam Mcrae      Oncology Visit  Date on this visit: Feb 7, 2022       Diagnosis: Invasive mammary carcinoma of the right breast.      Treatment to date:  1. Right wire-localized segmental mastectomy and sentinel lymph node biopsy (10/24/2018) T1c(2)N0  2. Oncotype DX 13  3. Adjuvant radiation therapy with Dr Blevins (12/5/2018 to 12/28/2018)  4. Adjuvant tamoxifen (2/4/2019 to ongoing)  5. Dec 2021 started letrozole (post menopausal)     Interval History:   Lauren returns to clinic for follow-up of breast cancer, for phone visit today.  In December she stopped her Tamoxifen and started letrozole.  She had a foot surgery in October for a bunion thus delayed the transition.  She feels she is doing really well, no joint issues, less \"heat\" and good mood.  She has been walking daily and encouraged by a few pound weight loss.     No history of headache, vision changes, lightheadedness, sob, cough, chest pain, n/v/d, bowel or bladder changes, abdominal pain, fatigue, appetite changes, fever or chills.      Past Medical History        Past Medical History:   Diagnosis Date     Allergic rhinitis       Chronic cough       GERD (gastroesophageal reflux disease)       Hypothyroidism           Past Surgical History         Past Surgical History:   Procedure Laterality Date     CHOLECYSTECTOMY         L salpingooophorectomy   2015     LUMPECTOMY BREAST WITH SENTINEL NODE, COMBINED Right 10/24/2018     Procedure: Right Wire Localized Segmental Mastectomy (Lumpectomy), Right [place] Lymph Node Biopsy;  Surgeon: Kalina Oviedo MD;  Location:  OR            Menstrual History:   Menarche around age 15.  Three pregnancies, uncomplicated.   the first two children.  Was not on OCPs until her mid-40s, at which time she " "started \"low-dose\" combined OCPs for painful periods.  LMP June 2020 - the past prior months periods had been heavier and more sporadic. No vaginal bleeding.          Physical Exam  Voice is clear and strong. No audible stridor, wheezing, or respiratory distress. The remainder of PE was deferred due to PHE.       Labs/Imaging: Mammogram WNL summer 2021.   Dexa previously was -1.3     ASSESSMENT/PLAN:  Gabriella Villarreal is a 56 year old woman with recently diagnosed Stage IA invasive mammary carcinoma involving the right breast, ER+/MI+ and Her2 non-amplified (pT1c(2)N0). She underwent wire-localized right-sided segmental mastectomy with Dr. Kalina Oviedo on 10/24/18. She started Tamoxifen for adjuvant endocrine therapy since (2/4/19) after radiation.  She now transitioned to letrozole due to being post menopausal.      #  Breast cancer - ER+/MI+ and Her2 non-amplified (pT1c(2)N0). OncoTypeDX score is 13, indicating low-risk of distant recurrence and no additional benefit of chemotherapy. She was on tamoxifen, but in December switched to letrozole.   She is tolerating this well without concerns.  She has routine PCP follow up.     She recently met with gynecology for exam and ultrasound, she has follow up with them next week.      We'll order a mammogram and in person visit for the summer.      # Bone Health - Dexa scan T score -1.3 in May 2019. Currently on tamoxifen. Due for DEXA scan.      # Lifestyle - Encourage regular exercise and healthy diet, for which she is doing. She continues to try and lose weight. She is walking 3-4 miles daily, increasing intensity of exercise can help with cardiovascular and lung health. She does not drink alcohol, does not smoke.       Andreia Kc PA-C       5 minutes spent on the date of the encounter doing chart review, review of outside records, review of test results and interpretation of tests, in addition to 19 minutes spent on the phone with the patient.     "

## 2022-05-21 ENCOUNTER — HEALTH MAINTENANCE LETTER (OUTPATIENT)
Age: 57
End: 2022-05-21

## 2022-07-20 NOTE — PROGRESS NOTES
Pictures were placed in Pt's chart today for future reference.                               HPI:    76y F with PMHx of Anxiety, CAD, hypothyroidism, IDDM, schizophrenia, dementia, s/p G-Tube, admitted for COVID + test at nursing home.   Per daughter Kimberlee, patient started having chest tightness/cough on , and tested + for covid. She is vaccinated. Was sent to ED by NH for SOB after + covid test.   Patient's baseline over the last 2 months has been bed bound, largely non-verbal.   I spoke to daughter Kimberlee extensively, she does not want her mother to be intubated or on a ventilator, or want CPR compressions. patient is DNR/DNI.     ED Course:   Sepsis Present on Admission, 100.7 F, HR 96, /70, 97% on nonrebreather   COVID (+)   Glucose 590, K 7.8 hemolyzed (5.7 non hemolyzed), Cr 3.5, lactate 2.9, AST/ALT 62/11, Anion Gap 26    Patient admitted for sepsis on admission and Hypoxemic respiratory failure  from COVID infection.        (2022 20:29)    PERTINENT PM/SXH:   No pertinent past medical history    Schizophrenia    Mild CAD    Coronary artery disease    Anxiety    Dementia    Insulin dependent diabetes mellitus    Paranoid schizophrenia    Hyperlipidemia    Hypothyroidism    Constipation    Sarcoma of right breast    Abscess of right buttock      H/O abdominal surgery      FAMILY HISTORY:  No pertinent family history in first degree relatives      ITEMS NOT CHECKED ARE NOT PRESENT    SOCIAL HISTORY:   Significant other/partner[ ]  Children[ X]  Amish/Spirituality:  Substance hx:  [ ]   Tobacco hx:  [ ]   Alcohol hx: [ ]   Living Situation: [ ]Home  [ X- [facility]]Long term care  [ ]Rehab [ ]Other  Home Services: [ ] HHA [ ] Sun RN [ ] Hospice  Occupation:  Home Opioid hx:  [ ] Y [ ] N [ X] I-Stop Reference No:  This report was requested by: Abiola David | Reference #: [id number]  Patient Name: Jillian MendozaBirth Date: 1945  Address: [address] [place] [id number]: Female  Rx Written	Rx Dispensed	Drug	Quantity	Days Supply	Prescriber Name	Prescriber Rosa #	Payment Method	Dispenser  2022	oxycodone-acetaminophen 5-325 mg tablet	21	7	Comfort Yo DO	[id number]	Medicaid	[facility]  2022	oxycodone-acetaminophen 5-325 mg tablet	60	15	Comfort Yo DO	[id number]	[place]	[facility]      ADVANCE DIRECTIVES:     MOLST  [ X]  Living Will  [ ]   DECISION MAKER(s):  [ ] Health Care Proxy(s)  [ X] Surrogate(s)  [ ] Guardian           Name(s): Phone Number(s): Mayra (daughter)     BASELINE (I)ADL(s) (prior to admission):  [place]: [ ]Total  [ ] Moderate [X ]Dependent  Palliative Performance Status Version 2:       30  %    [email or web address]/files/news/palliative_performance_scale_[email or web address].pdf    Allergies    No Known Allergies    Intolerances    MEDICATIONS  (STANDING):  ascorbic acid 500 milliGRAM(s) Oral daily  aspirin  chewable 81 milliGRAM(s) Oral daily  atorvastatin 20 milliGRAM(s) Oral at bedtime  cefTRIAXone   IVPB 1000 milliGRAM(s) IV Intermittent every 24 hours  chlorhexidine 2% Cloths 2 Application(s) Topical two times a day  dexAMETHasone  Injectable 6 milliGRAM(s) IV Push daily  dextrose 5% 1000 milliLiter(s) (150 mL/Hr) IV Continuous <Continuous>  dextrose 5%. 1000 milliLiter(s) (100 mL/Hr) IV Continuous <Continuous>  dextrose 5%. 1000 milliLiter(s) (100 mL/Hr) IV Continuous <Continuous>  dextrose 5%. 1000 milliLiter(s) (50 mL/Hr) IV Continuous <Continuous>  dextrose 50% Injectable 25 Gram(s) IV Push once  dextrose 50% Injectable 12.5 Gram(s) IV Push once  dextrose 50% Injectable 25 Gram(s) IV Push once  famotidine    Tablet 20 milliGRAM(s) Oral daily  glucagon  Injectable 1 milliGRAM(s) IntraMuscular once  heparin SubCutaneous Injection - Peds 5000 Unit(s) SubCutaneous every 12 hours  insulin regular Infusion 5 Unit(s)/Hr (5 mL/Hr) IV Continuous <Continuous>  lactated ringers Bolus 1000 milliLiter(s) IV Bolus once  levothyroxine 50 MICROGram(s) Oral daily  melatonin 5 milliGRAM(s) Oral at bedtime  remdesivir  IVPB 100 milliGRAM(s) IV Intermittent every 24 hours  remdesivir  IVPB   IV Intermittent   senna 8.6 milliGRAM(s) Oral Tablet - Peds 1 Tablet(s) Oral at bedtime  zinc sulfate 220 milliGRAM(s) Oral daily    MEDICATIONS  (PRN):  acetaminophen     Tablet .. 650 milliGRAM(s) Oral every 6 hours PRN Temp greater or equal to 38C (100.4F), Mild Pain (1 - 3), Moderate Pain (4 - 6)  dextrose Oral Gel 15 Gram(s) Oral once PRN Blood Glucose LESS THAN 70 milliGRAM(s)/deciliter  morphine  - Injectable 2 milliGRAM(s) IV Push every 4 hours PRN Moderate Pain (4 - 6)  oxyCODONE    IR 5 milliGRAM(s) Oral three times a day PRN Severe Pain (7 - 10)    PRESENT SYMPTOMS: [X ]Unable to obtain due to poor mentation   Source if other than patient:  [ ]Family   [ ]Team     Pain: [ ]yes [ ]no  QOL impact -   Location -                    Aggravating factors -  Quality -  Radiation -  Timing-  Severity (0-10 scale):  Minimal acceptable level (0-10 scale):     CPOT:    https://www.[facility].org/getattachment/[email or web address]/Critical-Care-Pain-Observation-Tool-(CPOT)      PAIN AD Score:     http://geriatrictoolkit.[place].[email or web address]/cog/painad.pdf (press ctrl +  left click to view)    Dyspnea:                           [ ]Mild [ ]Moderate [ ]Severe  Anxiety:                             [ ]Mild [ ]Moderate [ ]Severe  Fatigue:                             [ ]Mild [ ]Moderate [ ]Severe  Nausea:                             [ ]Mild [ ]Moderate [ ]Severe  Loss of appetite:              [ ]Mild [ ]Moderate [ ]Severe  Constipation:                    [ ]Mild [ ]Moderate [ ]Severe    Other Symptoms:  [ ]All other review of systems negative     Palliative Performance Status Version 2:         %    http://npcrc.org/files/news/palliative_performance_scale_ppsv2.pdf    PHYSICAL EXAM:  Vital Signs Last 24 Hrs  T(C): 37.9 (2022 01:27), Max: 38.3 (2022 11:18)  T(F): 100.3 (2022 01:27), Max: 101 (2022 11:18)  HR: 102 (2022 07:00) (82 - 119)  BP: 78/35 (2022 07:00) (78/35 - 121/50)  BP(mean): 48 (2022 07:00) (48 - 68)  RR: 26 (2022 07:00) (22 - 86)  SpO2: 100% (2022 07:00) (72% - 100%)      GENERAL:  [ ]Alert  [ ]Oriented x   [ ]Lethargic  [ ]Cachexia  [ ]Unarousable  [ ]Verbal  [ ]Non-Verbal  Behavioral:   [ ] Anxiety  [ ] Delirium [ ] Agitation [ ] Other  HEENT:  [ ]Normal   [ ]Dry mouth   [ ]ET Tube/Trach  [ ]Oral lesions  PULMONARY:   [ ]Clear [ ]Tachypnea  [ ]Audible excessive secretions   [ ]Rhonchi        [ ]Right [ ]Left [ ]Bilateral  [ ]Crackles        [ ]Right [ ]Left [ ]Bilateral  [ ]Wheezing     [ ]Right [ ]Left [ ]Bilateral  [ ]Diminished breath sounds [ ]right [ ]left [ ]bilateral  CARDIOVASCULAR:    [ ]Regular [ ]Irregular [ ]Tachy  [ ]Agustin [ ]Murmur [ ]Other  GASTROINTESTINAL:  [ ]Soft  [ ]Distended   [ ]+BS  [ ]Non tender [ ]Tender  [ ]PEG [ ]OGT/ NGT  Last BM:   GENITOURINARY:  [ ]Normal [ ] Incontinent   [ ]Oliguria/Anuria   [ ]Hobbs  MUSCULOSKELETAL:   [ ]Normal   [ ]Weakness  [ ]Bed/Wheelchair bound [ ]Edema  NEUROLOGIC:   [ ]No focal deficits  [ ]Cognitive impairment  [ ]Dysphagia [ ]Dysarthria [ ]Paresis [ ]Other   SKIN:   [ ]Normal    [ ]Rash  [ ]Pressure ulcer(s)       Present on admission [ ]y [ ]n    CRITICAL CARE:  [ ] Shock Present  [ ]Septic [ ]Cardiogenic [ ]Neurologic [ ]Hypovolemic  [ ]  Vasopressors [ ]  Inotropes   [ ]Respiratory failure present [ ]Mechanical ventilation [ ]Non-invasive ventilatory support [ ]High flow  [ ]Acute  [ ]Chronic [ ]Hypoxic  [ ]Hypercarbic [ ]Other  [ ]Other organ failure     LABS:                        8.0    23.47 )-----------( 158      ( 2022 04:10 )             28.8   07-20    158<H>  |  117<H>  |  117<HH>  ----------------------------<  384<H>  5.0   |  31  |  2.1<H>    Ca    9.3      2022 04:10  Phos  4.4       Mg     3.2         TPro  6.1  /  Alb  2.4<L>  /  TBili  <0.2  /  DBili  x   /  AST  19  /  ALT  11  /  AlkPhos  99  07-20  PT/INR - ( 2022 02:40 )   PT: 13.20 sec;   INR: 1.15 ratio         PTT - ( 2022 02:40 )  PTT:29.2 sec    Urinalysis Basic - ( 2022 14:55 )    Color: Judi / Appearance: Turbid / S.024 / pH: x  Gluc: x / Ketone: Trace  / Bili: Small / Urobili: 6 mg/dL   Blood: x / Protein: 100 mg/dL / Nitrite: Negative   Leuk Esterase: Large / RBC: 1 /HPF /  /HPF   Sq Epi: x / Non Sq Epi: 1 /HPF / Bacteria: Few      RADIOLOGY & ADDITIONAL STUDIES:    < from: Xray Chest 1 View- PORTABLE-Urgent (Xray Chest 1 View- PORTABLE-Urgent .) (22 @ 21:57) >  Impression:    Bibasilar opacities again noted with suggestion of a new left pleural   effusion.        --- End of Report ---    < end of copied text >        REFERRALS:   [ ]Chaplaincy  [ ]Hospice  [ ]Child Life  [ ]Social Work  [ ]Case management [ ]Holistic Therapy     Goals of Care Document:          HPI:    76y F with PMHx of Anxiety, CAD, hypothyroidism, IDDM, schizophrenia, dementia, s/p G-Tube, admitted for COVID + test at nursing home.   Per daughter Kimberlee, patient started having chest tightness/cough on , and tested + for covid. She is vaccinated. Was sent to ED by NH for SOB after + covid test.   Patient's baseline over the last 2 months has been bed bound, largely non-verbal.   I spoke to daughter Kimberlee extensively, she does not want her mother to be intubated or on a ventilator, or want CPR compressions. patient is DNR/DNI.     ED Course:   Sepsis Present on Admission, 100.7 F, HR 96, /70, 97% on nonrebreather   COVID (+)   Glucose 590, K 7.8 hemolyzed (5.7 non hemolyzed), Cr 3.5, lactate 2.9, AST/ALT 62/11, Anion Gap 26    Patient admitted for sepsis on admission and Hypoxemic respiratory failure  from COVID infection.        (2022 20:29)    PERTINENT PM/SXH:   No pertinent past medical history    Schizophrenia    Mild CAD    Coronary artery disease    Anxiety    Dementia    Insulin dependent diabetes mellitus    Paranoid schizophrenia    Hyperlipidemia    Hypothyroidism    Constipation    Sarcoma of right breast    Abscess of right buttock      H/O abdominal surgery      FAMILY HISTORY:  No pertinent family history in first degree relatives      ITEMS NOT CHECKED ARE NOT PRESENT    SOCIAL HISTORY:   Significant other/partner[ ]  Children[ X]  Religious/Spirituality:  Substance hx:  [ ]   Tobacco hx:  [ ]   Alcohol hx: [ ]   Living Situation: [ ]Home  [ X- [facility]]Long term care  [ ]Rehab [ ]Other  Home Services: [ ] HHA [ ] Sun RN [ ] Hospice  Occupation:  Home Opioid hx:  [ ] Y [ ] N [ X] I-Stop Reference No:  This report was requested by: Abiola David | Reference #: [id number]  Patient Name: Jillian MendozaBirth Date: 1945  Address: [address] [place] [id number]: Female  Rx Written	Rx Dispensed	Drug	Quantity	Days Supply	Prescriber Name	Prescriber Rosa #	Payment Method	Dispenser  2022	oxycodone-acetaminophen 5-325 mg tablet	21	7	Comfort Yo DO	[id number]	Medicaid	[facility]  2022	oxycodone-acetaminophen 5-325 mg tablet	60	15	Comfort Yo DO	[id number]	[employer]	[facility]      ADVANCE DIRECTIVES:     MOLST  [ X]  Living Will  [ ]   DECISION MAKER(s):  [ ] Health Care Proxy(s)  [ X] Surrogate(s)  [ ] Guardian           Name(s): Phone Number(s): Mayra (daughter)     BASELINE (I)ADL(s) (prior to admission):  [facility]: [ ]Total  [ ] Moderate [X ]Dependent  Palliative Performance Status Version 2:       30  %    [email or web address]/files/news/palliative_performance_scale_[email or web address].pdf    Allergies    No Known Allergies    Intolerances    MEDICATIONS  (STANDING):  ascorbic acid 500 milliGRAM(s) Oral daily  aspirin  chewable 81 milliGRAM(s) Oral daily  atorvastatin 20 milliGRAM(s) Oral at bedtime  cefTRIAXone   IVPB 1000 milliGRAM(s) IV Intermittent every 24 hours  chlorhexidine 2% Cloths 2 Application(s) Topical two times a day  dexAMETHasone  Injectable 6 milliGRAM(s) IV Push daily  dextrose 5% 1000 milliLiter(s) (150 mL/Hr) IV Continuous <Continuous>  dextrose 5%. 1000 milliLiter(s) (100 mL/Hr) IV Continuous <Continuous>  dextrose 5%. 1000 milliLiter(s) (100 mL/Hr) IV Continuous <Continuous>  dextrose 5%. 1000 milliLiter(s) (50 mL/Hr) IV Continuous <Continuous>  dextrose 50% Injectable 25 Gram(s) IV Push once  dextrose 50% Injectable 12.5 Gram(s) IV Push once  dextrose 50% Injectable 25 Gram(s) IV Push once  famotidine    Tablet 20 milliGRAM(s) Oral daily  glucagon  Injectable 1 milliGRAM(s) IntraMuscular once  heparin SubCutaneous Injection - Peds 5000 Unit(s) SubCutaneous every 12 hours  insulin regular Infusion 5 Unit(s)/Hr (5 mL/Hr) IV Continuous <Continuous>  lactated ringers Bolus 1000 milliLiter(s) IV Bolus once  levothyroxine 50 MICROGram(s) Oral daily  melatonin 5 milliGRAM(s) Oral at bedtime  remdesivir  IVPB 100 milliGRAM(s) IV Intermittent every 24 hours  remdesivir  IVPB   IV Intermittent   senna 8.6 milliGRAM(s) Oral Tablet - Peds 1 Tablet(s) Oral at bedtime  zinc sulfate 220 milliGRAM(s) Oral daily    MEDICATIONS  (PRN):  acetaminophen     Tablet .. 650 milliGRAM(s) Oral every 6 hours PRN Temp greater or equal to 38C (100.4F), Mild Pain (1 - 3), Moderate Pain (4 - 6)  dextrose Oral Gel 15 Gram(s) Oral once PRN Blood Glucose LESS THAN 70 milliGRAM(s)/deciliter  morphine  - Injectable 2 milliGRAM(s) IV Push every 4 hours PRN Moderate Pain (4 - 6)  oxyCODONE    IR 5 milliGRAM(s) Oral three times a day PRN Severe Pain (7 - 10)    PRESENT SYMPTOMS: [X ]Unable to obtain due to poor mentation   Source if other than patient:  [ ]Family   [ ]Team     Pain: [ ]yes [ ]no  QOL impact -   Location -                    Aggravating factors -  Quality -  Radiation -  Timing-  Severity (0-10 scale):  Minimal acceptable level (0-10 scale):     CPOT:  0  https://www.[facility].org/getattachment/[email or web address]-9837w6302b4c/Critical-Care-Pain-Observation-Tool-(CPOT)    Dyspnea:                           [ ]Mild [ ]Moderate [ ]Severe  Anxiety:                             [ ]Mild [ ]Moderate [ ]Severe  Fatigue:                             [ ]Mild [ ]Moderate [ ]Severe  Nausea:                             [ ]Mild [ ]Moderate [ ]Severe  Loss of appetite:              [ ]Mild [ ]Moderate [ ]Severe  Constipation:                    [ ]Mild [ ]Moderate [ ]Severe    Other Symptoms:  [ X]All other review of systems negative     Palliative Performance Status Version 2:       10  %    http://npcrc.org/files/news/palliative_performance_scale_ppsv2.pdf    PHYSICAL EXAM:  Vital Signs Last 24 Hrs  T(C): 37.9 (2022 01:27), Max: 38.3 (2022 11:18)  T(F): 100.3 (2022 01:27), Max: 101 (2022 11:18)  HR: 102 (2022 07:00) (82 - 119)  BP: 78/35 (2022 07:00) (78/35 - 121/50)  BP(mean): 48 (2022 07:00) (48 - 68)  RR: 26 (2022 07:00) (22 - 86)  SpO2: 100% (2022 07:00) (72% - 100%)      GENERAL:  [ ]Alert  [ ]Oriented x   [ ]Lethargic  [ ]Cachexia  [ X]Unarousable  [ ]Verbal  [ ]Non-Verbal  Behavioral:   [ ] Anxiety  [ ] Delirium [ ] Agitation [ ] Other  HEENT:  [ X]Normal   [ ]Dry mouth   [ ]ET Tube/Trach  [ ]Oral lesions  PULMONARY:   [ ]Clear [ X]Tachypnea  [ ]Audible excessive secretions   [ ]Rhonchi        [ ]Right [ ]Left [ ]Bilateral  [ ]Crackles        [ ]Right [ ]Left [ ]Bilateral  [ ]Wheezing     [ ]Right [ ]Left [ ]Bilateral  [ ]Diminished breath sounds [ ]right [ ]left [ ]bilateral  CARDIOVASCULAR:    [X ]Regular [ ]Irregular [ ]Tachy  [ ]Agustin [ ]Murmur [ ]Other  GASTROINTESTINAL:  [X ]Soft  [ ]Distended   [ ]+BS  [ ]Non tender [ ]Tender  [ ]PEG [ ]OGT/ NGT  Last BM:   GENITOURINARY:  [ X]Normal [ ] Incontinent   [ ]Oliguria/Anuria   [ ]Hobbs  MUSCULOSKELETAL:   [ ]Normal   [ ]Weakness  [ X]Bed/Wheelchair bound [ ]Edema  NEUROLOGIC:   [ ]No focal deficits  [X ]Cognitive impairment  [ ]Dysphagia [ ]Dysarthria [ ]Paresis [ ]Other   SKIN:   [X ]Normal    [ ]Rash  [ ]Pressure ulcer(s)       Present on admission [ ]y [ ]n    CRITICAL CARE:  [ ] Shock Present  [ ]Septic [ ]Cardiogenic [ ]Neurologic [ ]Hypovolemic  [ ]  Vasopressors [ ]  Inotropes   [ ]Respiratory failure present [ ]Mechanical ventilation [ X]Non-invasive ventilatory support [ ]High flow  [ ]Acute  [ ]Chronic [ ]Hypoxic  [ ]Hypercarbic [ ]Other  [ ]Other organ failure     LABS:                        8.0    23.47 )-----------( 158      ( 2022 04:10 )             28.8   07-20    158<H>  |  117<H>  |  117<HH>  ----------------------------<  384<H>  5.0   |  31  |  2.1<H>    Ca    9.3      2022 04:10  Phos  4.4     -  Mg     3.2     -    TPro  6.1  /  Alb  2.4<L>  /  TBili  <0.2  /  DBili  x   /  AST  19  /  ALT  11  /  AlkPhos  99  07-20  PT/INR - ( 2022 02:40 )   PT: 13.20 sec;   INR: 1.15 ratio         PTT - ( 2022 02:40 )  PTT:29.2 sec    Urinalysis Basic - ( 2022 14:55 )    Color: Judi / Appearance: Turbid / S.024 / pH: x  Gluc: x / Ketone: Trace  / Bili: Small / Urobili: 6 mg/dL   Blood: x / Protein: 100 mg/dL / Nitrite: Negative   Leuk Esterase: Large / RBC: 1 /HPF /  /HPF   Sq Epi: x / Non Sq Epi: 1 /HPF / Bacteria: Few      RADIOLOGY & ADDITIONAL STUDIES:    < from: Xray Chest 1 View- PORTABLE-Urgent (Xray Chest 1 View- PORTABLE-Urgent .) (22 @ 21:57) >  Impression:    Bibasilar opacities again noted with suggestion of a new left pleural   effusion.        --- End of Report ---    < end of copied text >       CC: covid +    HPI:    76y F with PMHx of Anxiety, CAD, hypothyroidism, IDDM, schizophrenia, dementia, s/p G-Tube, admitted for COVID + test at nursing home.   Per daughter Kimberlee, patient started having chest tightness/cough on , and tested + for covid. She is vaccinated. Was sent to ED by NH for SOB after + covid test.   Patient's baseline over the last 2 months has been bed bound, largely non-verbal.   I spoke to daughter Kimberlee extensively, she does not want her mother to be intubated or on a ventilator, or want CPR compressions. patient is DNR/DNI.     ED Course:   Sepsis Present on Admission, 100.7 F, HR 96, /70, 97% on nonrebreather   COVID (+)   Glucose 590, K 7.8 hemolyzed (5.7 non hemolyzed), Cr 3.5, lactate 2.9, AST/ALT 62/11, Anion Gap 26    Patient admitted for sepsis on admission and Hypoxemic respiratory failure  from COVID infection.        (2022 20:29)    PERTINENT PM/SXH:   No pertinent past medical history    Schizophrenia    Mild CAD    Coronary artery disease    Anxiety    Dementia    Insulin dependent diabetes mellitus    Paranoid schizophrenia    Hyperlipidemia    Hypothyroidism    Constipation    Sarcoma of right breast    Abscess of right buttock      H/O abdominal surgery      FAMILY HISTORY:  No pertinent family history in first degree relatives      ITEMS NOT CHECKED ARE NOT PRESENT    SOCIAL HISTORY:   Significant other/partner[ ]  Children[ X]  Orthodoxy/Spirituality:  Substance hx:  [ ]   Tobacco hx:  [ ]   Alcohol hx: [ ]   Living Situation: [ ]Home  [ X- [facility]]Long term care  [ ]Rehab [ ]Other  Home Services: [ ] HHA [ ] Sun RN [ ] Hospice  Occupation:  Home Opioid hx:  [ ] Y [ ] N [ X] I-Stop Reference No:  This report was requested by: Abiola David | Reference #: [id number]  Patient Name: Jillian MendozaBirth Date: 1945  Address: [address] [place] [id number]: Female  Rx Written	Rx Dispensed	Drug	Quantity	Days Supply	Prescriber Name	Prescriber Rosa #	Payment Method	Dispenser  2022	oxycodone-acetaminophen 5-325 mg tablet	21	7	Comfort Yo DO	[id number]	Medicaid	[facility]  2022	oxycodone-acetaminophen 5-325 mg tablet	60	15	Comfort Yo DO	[id number]	[place]	[facility]      ADVANCE DIRECTIVES:     MOLST  [ X]  Living Will  [ ]   DECISION MAKER(s):  [ ] Health Care Proxy(s)  [ X] Surrogate(s)  [ ] Guardian           Name(s): Phone Number(s): Mayra (daughter)     BASELINE (I)ADL(s) (prior to admission):  [place]: [ ]Total  [ ] Moderate [X ]Dependent  Palliative Performance Status Version 2:       30  %    [email or web address]/files/news/palliative_performance_scale_[email or web address].pdf    Allergies    No Known Allergies    Intolerances    MEDICATIONS  (STANDING):  ascorbic acid 500 milliGRAM(s) Oral daily  aspirin  chewable 81 milliGRAM(s) Oral daily  atorvastatin 20 milliGRAM(s) Oral at bedtime  cefTRIAXone   IVPB 1000 milliGRAM(s) IV Intermittent every 24 hours  chlorhexidine 2% Cloths 2 Application(s) Topical two times a day  dexAMETHasone  Injectable 6 milliGRAM(s) IV Push daily  dextrose 5% 1000 milliLiter(s) (150 mL/Hr) IV Continuous <Continuous>  dextrose 5%. 1000 milliLiter(s) (100 mL/Hr) IV Continuous <Continuous>  dextrose 5%. 1000 milliLiter(s) (100 mL/Hr) IV Continuous <Continuous>  dextrose 5%. 1000 milliLiter(s) (50 mL/Hr) IV Continuous <Continuous>  dextrose 50% Injectable 25 Gram(s) IV Push once  dextrose 50% Injectable 12.5 Gram(s) IV Push once  dextrose 50% Injectable 25 Gram(s) IV Push once  famotidine    Tablet 20 milliGRAM(s) Oral daily  glucagon  Injectable 1 milliGRAM(s) IntraMuscular once  heparin SubCutaneous Injection - Peds 5000 Unit(s) SubCutaneous every 12 hours  insulin regular Infusion 5 Unit(s)/Hr (5 mL/Hr) IV Continuous <Continuous>  lactated ringers Bolus 1000 milliLiter(s) IV Bolus once  levothyroxine 50 MICROGram(s) Oral daily  melatonin 5 milliGRAM(s) Oral at bedtime  remdesivir  IVPB 100 milliGRAM(s) IV Intermittent every 24 hours  remdesivir  IVPB   IV Intermittent   senna 8.6 milliGRAM(s) Oral Tablet - Peds 1 Tablet(s) Oral at bedtime  zinc sulfate 220 milliGRAM(s) Oral daily    MEDICATIONS  (PRN):  acetaminophen     Tablet .. 650 milliGRAM(s) Oral every 6 hours PRN Temp greater or equal to 38C (100.4F), Mild Pain (1 - 3), Moderate Pain (4 - 6)  dextrose Oral Gel 15 Gram(s) Oral once PRN Blood Glucose LESS THAN 70 milliGRAM(s)/deciliter  morphine  - Injectable 2 milliGRAM(s) IV Push every 4 hours PRN Moderate Pain (4 - 6)  oxyCODONE    IR 5 milliGRAM(s) Oral three times a day PRN Severe Pain (7 - 10)    PRESENT SYMPTOMS: [X ]Unable to obtain due to poor mentation   Source if other than patient:  [ ]Family   [ ]Team     Pain: [ ]yes [ ]no  QOL impact -   Location -                    Aggravating factors -  Quality -  Radiation -  Timing-  Severity (0-10 scale):  Minimal acceptable level (0-10 scale):     CPOT:  0  https://www.[facility].org/getattachment/[email or web address]-[email or web address]/Critical-Care-Pain-Observation-Tool-(CPOT)    Dyspnea:                           [ ]Mild [ ]Moderate [ ]Severe  Anxiety:                             [ ]Mild [ ]Moderate [ ]Severe  Fatigue:                             [ ]Mild [ ]Moderate [ ]Severe  Nausea:                             [ ]Mild [ ]Moderate [ ]Severe  Loss of appetite:              [ ]Mild [ ]Moderate [ ]Severe  Constipation:                    [ ]Mild [ ]Moderate [ ]Severe    Other Symptoms:  [ X]All other review of systems negative     Palliative Performance Status Version 2:       10  %    http://npcrc.org/files/news/palliative_performance_scale_ppsv2.pdf    PHYSICAL EXAM:  Vital Signs Last 24 Hrs  T(C): 37.9 (2022 01:27), Max: 38.3 (2022 11:18)  T(F): 100.3 (2022 01:27), Max: 101 (2022 11:18)  HR: 102 (2022 07:00) (82 - 119)  BP: 78/35 (2022 07:00) (78/35 - 121/50)  BP(mean): 48 (2022 07:00) (48 - 68)  RR: 26 (2022 07:00) (22 - 86)  SpO2: 100% (2022 07:00) (72% - 100%)      GENERAL:  [ ]Alert  [ ]Oriented x   [ ]Lethargic  [ ]Cachexia  [ X]Unarousable  [ ]Verbal  [ ]Non-Verbal  Behavioral:   [ ] Anxiety  [ ] Delirium [ ] Agitation [ ] Other  HEENT:  [ X]Normal   [ ]Dry mouth   [ ]ET Tube/Trach  [ ]Oral lesions  PULMONARY:   [ ]Clear [ X]Tachypnea  [ ]Audible excessive secretions   [ ]Rhonchi        [ ]Right [ ]Left [ ]Bilateral  [ ]Crackles        [ ]Right [ ]Left [ ]Bilateral  [ ]Wheezing     [ ]Right [ ]Left [ ]Bilateral  [ ]Diminished breath sounds [ ]right [ ]left [ ]bilateral  CARDIOVASCULAR:    [X ]Regular [ ]Irregular [ ]Tachy  [ ]Agustin [ ]Murmur [ ]Other  GASTROINTESTINAL:  [X ]Soft  [ ]Distended   [ ]+BS  [ ]Non tender [ ]Tender  [ ]PEG [ ]OGT/ NGT  Last BM:   GENITOURINARY:  [ X]Normal [ ] Incontinent   [ ]Oliguria/Anuria   [ ]Hobbs  MUSCULOSKELETAL:   [ ]Normal   [ ]Weakness  [ X]Bed/Wheelchair bound [ ]Edema  NEUROLOGIC:   [ ]No focal deficits  [X ]Cognitive impairment  [ ]Dysphagia [ ]Dysarthria [ ]Paresis [ ]Other   SKIN:   [X ]Normal    [ ]Rash  [ ]Pressure ulcer(s)       Present on admission [ ]y [ ]n    CRITICAL CARE:  [ ] Shock Present  [ ]Septic [ ]Cardiogenic [ ]Neurologic [ ]Hypovolemic  [ ]  Vasopressors [ ]  Inotropes   [ ]Respiratory failure present [ ]Mechanical ventilation [ X]Non-invasive ventilatory support [ ]High flow  [ ]Acute  [ ]Chronic [ ]Hypoxic  [ ]Hypercarbic [ ]Other  [ ]Other organ failure     LABS:  reviewed by me                        8.0    23.47 )-----------( 158      ( 2022 04:10 )             28.8   07-20    158<H>  |  117<H>  |  117<HH>  ----------------------------<  384<H>  5.0   |  31  |  2.1<H>    Ca    9.3      2022 04:10  Phos  4.4     -  Mg     3.2         TPro  6.1  /  Alb  2.4<L>  /  TBili  <0.2  /  DBili  x   /  AST  19  /  ALT  11  /  AlkPhos  99  07-20  PT/INR - ( 2022 02:40 )   PT: 13.20 sec;   INR: 1.15 ratio         PTT - ( 2022 02:40 )  PTT:29.2 sec    Urinalysis Basic - ( 2022 14:55 )    Color: Judi / Appearance: Turbid / S.024 / pH: x  Gluc: x / Ketone: Trace  / Bili: Small / Urobili: 6 mg/dL   Blood: x / Protein: 100 mg/dL / Nitrite: Negative   Leuk Esterase: Large / RBC: 1 /HPF /  /HPF   Sq Epi: x / Non Sq Epi: 1 /HPF / Bacteria: Few      RADIOLOGY & ADDITIONAL STUDIES: reviewed by me    < from: Xray Chest 1 View- PORTABLE-Urgent (Xray Chest 1 View- PORTABLE-Urgent .) (22 @ 21:57) >  Impression:    Bibasilar opacities again noted with suggestion of a new left pleural   effusion.      < from: 12 Lead ECG (22 @ 11:36) >  Ventricular Rate 96 BPM    Atrial Rate 96 BPM    P-R Interval 162 ms    QRS Duration 78 ms    Q-T Interval 348 ms    QTC Calculation(Bazett) 439 ms    P Axis 59 degrees    R Axis 107 degrees    T Axis 36 degrees    Diagnosis Line Sinus rhythm with Premature atrial complexes  Rightward axis  Low voltage QRS  Cannot rule out Anterior infarct , age undetermined  Abnormal ECG    < end of copied text >

## 2022-08-13 DIAGNOSIS — Z17.0 MALIGNANT NEOPLASM OF UPPER-OUTER QUADRANT OF RIGHT BREAST IN FEMALE, ESTROGEN RECEPTOR POSITIVE (H): ICD-10-CM

## 2022-08-13 DIAGNOSIS — C50.411 MALIGNANT NEOPLASM OF UPPER-OUTER QUADRANT OF RIGHT BREAST IN FEMALE, ESTROGEN RECEPTOR POSITIVE (H): ICD-10-CM

## 2022-08-15 RX ORDER — LETROZOLE 2.5 MG/1
TABLET, FILM COATED ORAL
Qty: 90 TABLET | Refills: 3 | Status: SHIPPED | OUTPATIENT
Start: 2022-08-15 | End: 2023-08-02

## 2022-08-15 NOTE — TELEPHONE ENCOUNTER
letrozole (FEMARA) Refill   Last prescribing provider: Dr Berrios     Last clinic visit date: 2/7/22 Bea Kc     Any missed appointments or no-shows since last clinic visit?: no     Recommendations for requested medication (if none, N/A): Copied from chart note 2/7/22 Bea Kc   #  Breast cancer - ER+/CT+ and Her2 non-amplified (pT1c(2)N0). OncoTypeDX score is 13, indicating low-risk of distant recurrence and no additional benefit of chemotherapy. She was on tamoxifen, but in December switched to letrozole. She is tolerating this well without concerns. She has routine PCP follow up.       Next clinic visit date: 10/24/22 Dr Berrios     Any other pertinent information (if none, N/A): N/A

## 2022-09-18 ENCOUNTER — HEALTH MAINTENANCE LETTER (OUTPATIENT)
Age: 57
End: 2022-09-18

## 2022-10-24 ENCOUNTER — ONCOLOGY VISIT (OUTPATIENT)
Dept: ONCOLOGY | Facility: CLINIC | Age: 57
End: 2022-10-24
Attending: PHYSICIAN ASSISTANT
Payer: COMMERCIAL

## 2022-10-24 ENCOUNTER — ANCILLARY PROCEDURE (OUTPATIENT)
Dept: MAMMOGRAPHY | Facility: CLINIC | Age: 57
End: 2022-10-24
Attending: PHYSICIAN ASSISTANT
Payer: COMMERCIAL

## 2022-10-24 ENCOUNTER — ANCILLARY PROCEDURE (OUTPATIENT)
Dept: BONE DENSITY | Facility: CLINIC | Age: 57
End: 2022-10-24
Attending: PHYSICIAN ASSISTANT
Payer: COMMERCIAL

## 2022-10-24 VITALS
WEIGHT: 170 LBS | DIASTOLIC BLOOD PRESSURE: 71 MMHG | OXYGEN SATURATION: 100 % | SYSTOLIC BLOOD PRESSURE: 104 MMHG | HEART RATE: 63 BPM | BODY MASS INDEX: 29.16 KG/M2 | TEMPERATURE: 97.8 F

## 2022-10-24 DIAGNOSIS — Z17.0 MALIGNANT NEOPLASM OF UPPER-OUTER QUADRANT OF RIGHT BREAST IN FEMALE, ESTROGEN RECEPTOR POSITIVE (H): ICD-10-CM

## 2022-10-24 DIAGNOSIS — Z23 NEED FOR PROPHYLACTIC VACCINATION AND INOCULATION AGAINST INFLUENZA: Primary | ICD-10-CM

## 2022-10-24 DIAGNOSIS — Z79.811 USE OF AROMATASE INHIBITORS: ICD-10-CM

## 2022-10-24 DIAGNOSIS — C50.411 MALIGNANT NEOPLASM OF UPPER-OUTER QUADRANT OF RIGHT BREAST IN FEMALE, ESTROGEN RECEPTOR POSITIVE (H): ICD-10-CM

## 2022-10-24 PROCEDURE — 90682 RIV4 VACC RECOMBINANT DNA IM: CPT | Performed by: INTERNAL MEDICINE

## 2022-10-24 PROCEDURE — 99213 OFFICE O/P EST LOW 20 MIN: CPT | Performed by: INTERNAL MEDICINE

## 2022-10-24 PROCEDURE — 77080 DXA BONE DENSITY AXIAL: CPT | Performed by: INTERNAL MEDICINE

## 2022-10-24 PROCEDURE — 77067 SCR MAMMO BI INCL CAD: CPT | Mod: GC | Performed by: RADIOLOGY

## 2022-10-24 PROCEDURE — 250N000011 HC RX IP 250 OP 636: Performed by: INTERNAL MEDICINE

## 2022-10-24 PROCEDURE — G0008 ADMIN INFLUENZA VIRUS VAC: HCPCS | Performed by: INTERNAL MEDICINE

## 2022-10-24 PROCEDURE — 77063 BREAST TOMOSYNTHESIS BI: CPT | Mod: GC | Performed by: RADIOLOGY

## 2022-10-24 PROCEDURE — G0463 HOSPITAL OUTPT CLINIC VISIT: HCPCS | Mod: 25

## 2022-10-24 RX ORDER — LEVOTHYROXINE SODIUM 100 UG/1
1 TABLET ORAL DAILY
COMMUNITY
Start: 2022-09-28

## 2022-10-24 RX ADMIN — INFLUENZA A VIRUS A/WISCONSIN/588/2019 (H1N1) RECOMBINANT HEMAGGLUTININ ANTIGEN, INFLUENZA A VIRUS A/DARWIN/6/2021 (H3N2) RECOMBINANT HEMAGGLUTININ ANTIGEN, INFLUENZA B VIRUS B/AUSTRIA/1359417/2021 RECOMBINANT HEMAGGLUTININ ANTIGEN, AND INFLUENZA B VIRUS B/PHUKET/3073/2013 RECOMBINANT HEMAGGLUTININ ANTIGEN 0.5 ML: 45; 45; 45; 45 INJECTION INTRAMUSCULAR at 10:53

## 2022-10-24 ASSESSMENT — PAIN SCALES - GENERAL: PAINLEVEL: NO PAIN (1)

## 2022-10-24 NOTE — NURSING NOTE
"Oncology Rooming Note    October 24, 2022 10:04 AM   Gabriella Villarreal is a 57 year old female who presents for:    Chief Complaint   Patient presents with     Oncology Clinic Visit     Malignant neoplasm of upper-outer quadrant of right breast     Initial Vitals: /71 (BP Location: Left arm, Patient Position: Sitting, Cuff Size: Adult Regular)   Pulse 63   Temp 97.8  F (36.6  C) (Oral)   Wt 77.1 kg (170 lb)   SpO2 100%   BMI 29.16 kg/m   Estimated body mass index is 29.16 kg/m  as calculated from the following:    Height as of 7/29/19: 1.626 m (5' 4.02\").    Weight as of this encounter: 77.1 kg (170 lb). Body surface area is 1.87 meters squared.  No Pain (1) Comment: Data Unavailable   No LMP recorded. Patient is perimenopausal.  Allergies reviewed: Yes  Medications reviewed: Yes    Medications: MEDICATION REFILLS NEEDED TODAY. Provider was notified.  Pharmacy name entered into tamyca:    [facility] - [place], [place] - [address] [place] [address] [phone]  [facility] #[id number] [place] [id number] [address] AT [facility] & [address] [address] [place] [facility] [place] [place] [facility] [place], [place] - [address] AT PORTAL TO REGISTERED [facility] SITES    Clinical concerns: needs refill for letrozole  Yash was notified.      Miah Nielsen"

## 2022-10-24 NOTE — PROGRESS NOTES
Oncology Visit  Date on this visit: 10/24/2022      Diagnosis: Invasive mammary carcinoma of the right breast.     Treatment to date:  1. Right wire-localized segmental mastectomy and sentinel lymph node biopsy (10/24/2018) T1c(2)N0  2. Oncotype DX 13  3. Adjuvant radiation therapy with Dr Blevins (12/5/2018 to 12/28/2018)  4. Adjuvant tamoxifen (2/4/2019 to ongoing)  5. Switched to letrozole - Dec 2021    Interval History:   Lauren returns to clinic for follow-up of breast cancer.  She had her mammogram and bone density scan.  She remains on letrozole with few side effects.  She thinks overall that the letrozole is being tolerated better than tamoxifen was.    She saw OB/GYN last winter for postmenopausal bleeding.  She had a pelvic ultrasound and subsequent biopsy which was ultimately benign.  She is scheduled to go back and see them later this year.    She has no pain.  She got over COVID last month.  She has no fevers or chills, nausea or vomiting, diarrhea or constipation.    She recently noticed some pain involving her left shoulder.  She has an appointment with her primary care provider next week.     ROS: 10 point ROS neg other than the symptoms noted above in the HPI.      Past Medical History:   Diagnosis Date     Allergic rhinitis      Chronic cough      GERD (gastroesophageal reflux disease)      Hypothyroidism      Past Surgical History:   Procedure Laterality Date     CHOLECYSTECTOMY       L salpingooophorectomy  2015     LUMPECTOMY BREAST WITH SENTINEL NODE, COMBINED Right 10/24/2018    Procedure: Right Wire Localized Segmental Mastectomy (Lumpectomy), Right [place] Lymph Node Biopsy;  Surgeon: Kalina Oviedo MD;  [facility]         OBJECTIVE     Physical Exam  /71 (BP Location: Left arm, Patient Position: Sitting, Cuff Size: Adult Regular)   Pulse 63   Temp 97.8  F (36.6  C) (Oral)   Wt 77.1 kg (170 lb)   SpO2 100%   BMI 29.16 kg/m     GENERAL:  Alert, no acute  distress  HEAD/NECK:  PERRL, EOMI, no scleral icterus  LYMPH NODES: No palpable cervical, axillary, abdominal or inguinal lymph nodes  BREAST: Well healed right segmental mastectomy scar. No palpable lumps on b/l breast exam.   LUNGS:  Clear to auscultation b/l, no wheezes or crackles  CARDIOVASCULAR:  Regular rate and rhythm, normal S1, S2 without rub, gallop or murmur  EXTREMITIES:  No lower extremity edema.   NEURO: CN 3-12 grossly intact, equal motor strength in major muscle groups of all four extremities  SKIN: No rashes  PSYCH: Normal affect     Labs/Imaging: Mammogram completed today. Report benign    We reviewed her dexa scan personally, and with the patient demonstrating osteopenia with 7-10% loss in the last three years.    ASSESSMENT/PLAN:  Gabriella Villarreal is a 57 year old woman with recently diagnosed Stage IA invasive mammary carcinoma involving the right breast, ER+/DC+ and Her2 non-amplified (pT1c(2)N0). She underwent wire-localized right-sided segmental mastectomy with Dr. Kalina Oviedo on 10/24/18.    She was on Tamoxifen for adjuvant endocrine therapy since (2/4/19), and then switched to an AI Jan 2022.     #  Breast cancer - ER+/DC+ and Her2 non-amplified (pT1c(2)N0). OncoTypeDX score is 13, indicating low-risk of distant recurrence and no additional benefit of chemotherapy.  We reviewed her mammogram demonstrating no abnormalities.  Will plan on continuing letrozole.      Mammogram in 1 year.       # Bone Health - Dexa scan T score -1.3 in May 2019. Reviewed repeat scan.  Currently on calcium, vitamin D.  Encouraged strength training and weight training.    # Postmenopausal bleeding -  Resolved.  She is seeing GYN this winter again.     # Lifestyle - Encourage regular exercise and healthy diet, for which she is doing. She continues to try and lose weight. She is walking 3-4 miles daily, increasing intensity of exercise can help with cardiovascular and lung health. She does not drink alcohol, does not  smoke.      Karen Berrios MD

## 2022-10-24 NOTE — NURSING NOTE
Influenza vaccine given to patient in Left Deltoid . Patient tolerated injection without any incidents.     Has the patient received the information for the injectable influenza vaccine? YES    Miah Nielsen, EMT October 24, 2022 11:10 AM

## 2022-10-24 NOTE — LETTER
10/24/2022         RE: Gabriella Villarreal  [address]  [facility] [address]        Dear Colleague,    Thank you for referring your patient, Gabriella Villarreal, to the [facility]. Please see a copy of my visit note below.    Oncology Visit  Date on this visit: 10/24/2022      Diagnosis: Invasive mammary carcinoma of the right breast.     Treatment to date:  1. Right wire-localized segmental mastectomy and sentinel lymph node biopsy (10/24/2018) T1c(2)N0  2. Oncotype DX 13  3. Adjuvant radiation therapy with Dr Blevins (12/5/2018 to 12/28/2018)  4. Adjuvant tamoxifen (2/4/2019 to ongoing)  5. Switched to letrozole - Dec 2021    Interval History:   Lauren returns to clinic for follow-up of breast cancer.  She had her mammogram and bone density scan.  She remains on letrozole with few side effects.  She thinks overall that the letrozole is being tolerated better than tamoxifen was.    She saw OB/GYN last winter for postmenopausal bleeding.  She had a pelvic ultrasound and subsequent biopsy which was ultimately benign.  She is scheduled to go back and see them later this year.    She has no pain.  She got over COVID last month.  She has no fevers or chills, nausea or vomiting, diarrhea or constipation.    She recently noticed some pain involving her left shoulder.  She has an appointment with her primary care provider next week.     ROS: 10 point ROS neg other than the symptoms noted above in the HPI.      Past Medical History:   Diagnosis Date     Allergic rhinitis      Chronic cough      GERD (gastroesophageal reflux disease)      Hypothyroidism      Past Surgical History:   Procedure Laterality Date     CHOLECYSTECTOMY       L salpingooophorectomy  2015     LUMPECTOMY BREAST WITH SENTINEL NODE, COMBINED Right 10/24/2018    Procedure: Right Wire Localized Segmental Mastectomy (Lumpectomy), Right [place] Lymph Node Biopsy;  Surgeon: Kalina Oviedo MD;  Location:  OR          OBJECTIVE     Physical Exam  /71 (BP Location: Left arm, Patient Position: Sitting, Cuff Size: Adult Regular)   Pulse 63   Temp 97.8  F (36.6  C) (Oral)   Wt 77.1 kg (170 lb)   SpO2 100%   BMI 29.16 kg/m     GENERAL:  Alert, no acute distress  HEAD/NECK:  PERRL, EOMI, no scleral icterus  LYMPH NODES: No palpable cervical, axillary, abdominal or inguinal lymph nodes  BREAST: Well healed right segmental mastectomy scar. No palpable lumps on b/l breast exam.   LUNGS:  Clear to auscultation b/l, no wheezes or crackles  CARDIOVASCULAR:  Regular rate and rhythm, normal S1, S2 without rub, gallop or murmur  EXTREMITIES:  No lower extremity edema.   NEURO: CN 3-12 grossly intact, equal motor strength in major muscle groups of all four extremities  SKIN: No rashes  PSYCH: Normal affect     Labs/Imaging: Mammogram completed today. Report benign    We reviewed her dexa scan personally, and with the patient demonstrating osteopenia with 7-10% loss in the last three years.    ASSESSMENT/PLAN:  Gabriella Villarreal is a 57 year old woman with recently diagnosed Stage IA invasive mammary carcinoma involving the right breast, ER+/NH+ and Her2 non-amplified (pT1c(2)N0). She underwent wire-localized right-sided segmental mastectomy with Dr. Kalina Oviedo on 10/24/18.    She was on Tamoxifen for adjuvant endocrine therapy since (2/4/19), and then switched to an AI Jan 2022.     #  Breast cancer - ER+/NH+ and Her2 non-amplified (pT1c(2)N0). OncoTypeDX score is 13, indicating low-risk of distant recurrence and no additional benefit of chemotherapy.  We reviewed her mammogram demonstrating no abnormalities.  Will plan on continuing letrozole.      Mammogram in 1 year.       # Bone Health - Dexa scan T score -1.3 in May 2019. Reviewed repeat scan.  Currently on calcium, vitamin D.  Encouraged strength training and weight training.    # Postmenopausal bleeding -  Resolved.  She is seeing GYN this winter again.     # Lifestyle -  Encourage regular exercise and healthy diet, for which she is doing. She continues to try and lose weight. She is walking 3-4 miles daily, increasing intensity of exercise can help with cardiovascular and lung health. She does not drink alcohol, does not smoke.      Karen Berrios MD

## 2023-04-25 ENCOUNTER — VIRTUAL VISIT (OUTPATIENT)
Dept: ONCOLOGY | Facility: CLINIC | Age: 58
End: 2023-04-25
Attending: PHYSICIAN ASSISTANT
Payer: COMMERCIAL

## 2023-04-25 DIAGNOSIS — N95.0 POST-MENOPAUSAL BLEEDING: ICD-10-CM

## 2023-04-25 DIAGNOSIS — Z79.811 USE OF AROMATASE INHIBITORS: ICD-10-CM

## 2023-04-25 DIAGNOSIS — Z17.0 MALIGNANT NEOPLASM OF UPPER-OUTER QUADRANT OF RIGHT BREAST IN FEMALE, ESTROGEN RECEPTOR POSITIVE (H): Primary | ICD-10-CM

## 2023-04-25 DIAGNOSIS — C50.411 MALIGNANT NEOPLASM OF UPPER-OUTER QUADRANT OF RIGHT BREAST IN FEMALE, ESTROGEN RECEPTOR POSITIVE (H): Primary | ICD-10-CM

## 2023-04-25 PROCEDURE — 99214 OFFICE O/P EST MOD 30 MIN: CPT | Mod: VID | Performed by: PHYSICIAN ASSISTANT

## 2023-04-25 RX ORDER — FLUCONAZOLE 200 MG/1
TABLET ORAL
COMMUNITY
Start: 2023-03-29

## 2023-04-25 NOTE — PROGRESS NOTES
"Virtual Visit Details    Type of service:  Video Visit     Originating Location (pt. Location):HOME  Distant Location (provider location): OFF SITE  Platform used for Video Visit:REGINA Aguilar is a 56 year old who is being evaluated via a billable telephone visit.      What phone number would you like to be contacted at? [phone]  How would you like to obtain your AVS? Rashaun Mcrae      Oncology Visit  Date on this visit: Apr 25, 2023       Diagnosis: Invasive mammary carcinoma of the right breast.      Treatment to date:  1. Right wire-localized segmental mastectomy and sentinel lymph node biopsy (10/24/2018) T1c(2)N0  2. Oncotype DX 13  3. Adjuvant radiation therapy with Dr Blevins (12/5/2018 to 12/28/2018)  4. Adjuvant tamoxifen (2/4/2019 -12/2021) Required 10 mg due to heavy bleeding.   5. Dec 2021 started letrozole (post menopausal)     Interval History:   Last week had a \"bleed\" spotting, needed a liner and a few days needed a full pad.  Felt like a full period.  - sees GYN on 5/1.  Feb of 2022- last Pap had \"irritated cervix\" had a US at that time as well.  Biopsy was done and no concerns.  Did have a new US last week- it suggests ongoing cystic endometrium, she is waiting to hear about an endometrial biopsy, per GYN.    Lauren has noticed some hair loss, she feels it occurred after the letrozole.  But also has a thyroid diagnosis, although TSH has been stable, she continues on 100 mcg daily.  Gets checked through PCP.     Has some stiffness in the knees, wrists, ankles after prolonged sitting.  Not holding her back from activities.     Just started walking routinely for exercise- 4 miles.     Started 200 mg fluconazole weekly x 16 weeks, for toenail fungus.     No history of headache, vision changes, lightheadedness, sob, cough, chest pain, n/v/d, bowel or bladder changes, abdominal pain, fatigue, appetite changes, fever or chills.      Past Medical History        Past Medical History: " "  Diagnosis Date     Allergic rhinitis       Chronic cough       GERD (gastroesophageal reflux disease)       Hypothyroidism           Past Surgical History         Past Surgical History:   Procedure Laterality Date     CHOLECYSTECTOMY         L salpingooophorectomy   2015     LUMPECTOMY BREAST WITH SENTINEL NODE, COMBINED Right 10/24/2018     Procedure: Right Wire Localized Segmental Mastectomy (Lumpectomy), Right [place] Lymph Node Biopsy;  Surgeon: Kalina Oviedo MD;  [facility]            Menstrual History:   Menarche around age 15.  Three pregnancies, uncomplicated.   the first two children.  Was not on OCPs until her mid-40s, at which time she started \"low-dose\" combined OCPs for painful periods.  LMP June 2020 - the past prior months periods had been heavier and more sporadic. No vaginal bleeding.  Post-sammie, no menses x 1 year and labs were post sammie in 8/21          Physical Exam  Video physical exam  General: Patient appears well in no acute distress.   Skin: No visualized rash or lesions on visualized skin  Eyes: EOMI, no erythema, sclera icterus or discharge noted  Resp: Appears to be breathing comfortably without accessory muscle usage, speaking in full sentences, no cough  MSK: Appears to have normal range of motion based on visualized movements  Neurologic: No apparent tremors, facial movements symmetric  Psych: affect bright, alert and oriented       Labs/Imaging:     Mammogram WNL OCt 2022  Dexa previously was -1.3 in 2019, worsened to -2.2 (mostly in the l-spine) in      ASSESSMENT/PLAN:  Gabriella Villarreal is a 57 year old woman with recently diagnosed Stage IA invasive mammary carcinoma involving the right breast, ER+/AL+ and Her2 non-amplified (pT1c(2)N0). She underwent wire-localized right-sided segmental mastectomy with Dr. Kalina Oviedo on 10/24/18. She started Tamoxifen for adjuvant endocrine therapy since (2/4/19) after radiation.  She now transitioned to letrozole (12/2021) " due to being post menopausal.      #  Breast cancer - ER+/NM+ and Her2 non-amplified (pT1c(2)N0). OncoTypeDX score is 13, indicating low-risk of distant recurrence and no additional benefit of chemotherapy. She was on tamoxifen, but in December 2021 switched to letrozole.   She is tolerating this well without concerns.  She has routine PCP follow up.   -We'll order a mammogram and in person visit for the fall     #post menopausal bleed: She recently had an ultrasound, she has follow up with GYN next week; arranging a endometrial biopsy     # Bone Health - Dexa scan T score -1.3 in May 2019. October 2022 DEXA showed worsening bone density.  She was not taking calcium or vitamin D which we discussed.  Will initiate weight bearing exercises.      # Lifestyle - Encourage regular exercise and healthy diet, for which she is doing. She continues to try and reduce her BMI. She is walking 3-4 miles daily, increasing intensity of exercise can help with cardiovascular and lung health. No etoh.     Andreia Kc PA-C

## 2023-04-25 NOTE — LETTER
"    4/25/2023         RE: Gabriella Villarreal  [address]  [place] [address]        Dear Colleague,    Thank you for referring your patient, Gabriella Villarreal, to the [facility]. Please see a copy of my visit note below.    Virtual Visit Details    Type of service:  Video Visit     Originating Location (pt. Location):HOME  Distant Location (provider location): OFF SITE  Platform used for Video Visit:REGINA Aguilar is a 56 year old who is being evaluated via a billable telephone visit.      What phone number would you like to be contacted at? [phone]  How would you like to obtain your AVS? Rashaun Mcrae      Oncology Visit  Date on this visit: Apr 25, 2023       Diagnosis: Invasive mammary carcinoma of the right breast.      Treatment to date:  1. Right wire-localized segmental mastectomy and sentinel lymph node biopsy (10/24/2018) T1c(2)N0  2. Oncotype DX 13  3. Adjuvant radiation therapy with Dr Blevins (12/5/2018 to 12/28/2018)  4. Adjuvant tamoxifen (2/4/2019 -12/2021) Required 10 mg due to heavy bleeding.   5. Dec 2021 started letrozole (post menopausal)     Interval History:   Last week had a \"bleed\" spotting, needed a liner and a few days needed a full pad.  Felt like a full period.  - sees GYN on 5/1.  Feb of 2022- last Pap had \"irritated cervix\" had a US at that time as well.  Biopsy was done and no concerns.  Did have a new US last week- it suggests ongoing cystic endometrium, she is waiting to hear about an endometrial biopsy, per GYN.    Lauren has noticed some hair loss, she feels it occurred after the letrozole.  But also has a thyroid diagnosis, although TSH has been stable, she continues on 100 mcg daily.  Gets checked through PCP.     Has some stiffness in the knees, wrists, ankles after prolonged sitting.  Not holding her back from activities.     Just started walking routinely for exercise- 4 miles.     Started 200 mg fluconazole weekly x 16 weeks, " "for toenail fungus.     No history of headache, vision changes, lightheadedness, sob, cough, chest pain, n/v/d, bowel or bladder changes, abdominal pain, fatigue, appetite changes, fever or chills.      Past Medical History        Past Medical History:   Diagnosis Date    Allergic rhinitis      Chronic cough      GERD (gastroesophageal reflux disease)      Hypothyroidism           Past Surgical History         Past Surgical History:   Procedure Laterality Date    CHOLECYSTECTOMY        L salpingooophorectomy   2015    LUMPECTOMY BREAST WITH SENTINEL NODE, COMBINED Right 10/24/2018     Procedure: Right Wire Localized Segmental Mastectomy (Lumpectomy), Right [place] Lymph Node Biopsy;  Surgeon: Kalina Oviedo MD;  [facility]            Menstrual History:   Menarche around age 15.  Three pregnancies, uncomplicated.   the first two children.  Was not on OCPs until her mid-40s, at which time she started \"low-dose\" combined OCPs for painful periods.  LMP June 2020 - the past prior months periods had been heavier and more sporadic. No vaginal bleeding.  Post-sammie, no menses x 1 year and labs were post sammie in 8/21          Physical Exam  Video physical exam  General: Patient appears well in no acute distress.   Skin: No visualized rash or lesions on visualized skin  Eyes: EOMI, no erythema, sclera icterus or discharge noted  Resp: Appears to be breathing comfortably without accessory muscle usage, speaking in full sentences, no cough  MSK: Appears to have normal range of motion based on visualized movements  Neurologic: No apparent tremors, facial movements symmetric  Psych: affect bright, alert and oriented       Labs/Imaging:     Mammogram WNL OCt 2022  Dexa previously was -1.3 in 2019, worsened to -2.2 (mostly in the l-spine) in      ASSESSMENT/PLAN:  Gabriella Villarreal is a 57 year old woman with recently diagnosed Stage IA invasive mammary carcinoma involving the right breast, ER+/NC+ and Her2 " non-amplified (pT1c(2)N0). She underwent wire-localized right-sided segmental mastectomy with Dr. Kalina Oviedo on 10/24/18. She started Tamoxifen for adjuvant endocrine therapy since (2/4/19) after radiation.  She now transitioned to letrozole (12/2021) due to being post menopausal.      #  Breast cancer - ER+/LA+ and Her2 non-amplified (pT1c(2)N0). OncoTypeDX score is 13, indicating low-risk of distant recurrence and no additional benefit of chemotherapy. She was on tamoxifen, but in December 2021 switched to letrozole.   She is tolerating this well without concerns.  She has routine PCP follow up.   -We'll order a mammogram and in person visit for the fall     #post menopausal bleed: She recently had an ultrasound, she has follow up with GYN next week; arranging a endometrial biopsy     # Bone Health - Dexa scan T score -1.3 in May 2019. October 2022 DEXA showed worsening bone density.  She was not taking calcium or vitamin D which we discussed.  Will initiate weight bearing exercises.      # Lifestyle - Encourage regular exercise and healthy diet, for which she is doing. She continues to try and reduce her BMI. She is walking 3-4 miles daily, increasing intensity of exercise can help with cardiovascular and lung health. No etoh.     Andreia Kc PA-C

## 2023-04-25 NOTE — NURSING NOTE
Is the patient currently in the state of [place]? YES    Visit mode:VIDEO    If the visit is dropped, the patient can be reconnected by: VIDEO VISIT: Text to cell phone: [phone]    Will anyone else be joining the visit? NO      How would you like to obtain your AVS? MyChart    Are changes needed to the allergy or medication list? NO    Reason for visit: RECHECK (Follow up)

## 2023-06-04 ENCOUNTER — HEALTH MAINTENANCE LETTER (OUTPATIENT)
Age: 58
End: 2023-06-04

## 2023-08-02 DIAGNOSIS — Z17.0 MALIGNANT NEOPLASM OF UPPER-OUTER QUADRANT OF RIGHT BREAST IN FEMALE, ESTROGEN RECEPTOR POSITIVE (H): ICD-10-CM

## 2023-08-02 DIAGNOSIS — C50.411 MALIGNANT NEOPLASM OF UPPER-OUTER QUADRANT OF RIGHT BREAST IN FEMALE, ESTROGEN RECEPTOR POSITIVE (H): ICD-10-CM

## 2023-08-02 RX ORDER — LETROZOLE 2.5 MG/1
TABLET, FILM COATED ORAL
Qty: 90 TABLET | Refills: 3 | Status: SHIPPED | OUTPATIENT
Start: 2023-08-02 | End: 2024-07-29

## 2023-08-02 NOTE — TELEPHONE ENCOUNTER
Medication: Letrozole 2.5 mg tablet  Last prescribing provider: Bea Kc    Last clinic visit date: 04/25/23 w/Bea Kc    Any missed appointments or no-shows since last clinic visit?: No    Recommendations for requested medication (if none, N/A): Copied from last OV note:  She was on tamoxifen, but in December 2021 switched to letrozole.   She is tolerating this well without concerns.     Next clinic visit date: 10/26/23 w/    Any other pertinent information (if none, N/A): N/A    Pended and Routed to Provider.

## 2023-09-01 NOTE — LETTER
1/28/2019     RE: Gabriella Villarreal  [address] [facility] [place] [address]     Dear Colleague,    Thank you for referring your patient, Gabriella Villarreal, to the [facility]. Please see a copy of my visit note below.    Oncology Visit  Date on this visit: 11/19/2018    Gabriella Villarreal  is referred by Dr.Jane Lyric Oviedo for an oncology consultation. She requires evaluation for new diagnosis of invasive mammary carcinoma of the right breast, s/p lumpectomy.     History Of Present Illness:  Ms. Villareral is a 53 year old female who presents with new diagnosis of invasive mammary carcinoma involving the right breast. She underwent right sided wire-localized segmental mastectomy and sentinel lymph node biopsy on 10/24/18 with Dr. Kalina Oviedo. She was seen in follow-up in the post-operative period and has been noted to be healing well. Her course was complicated by a rash on the right breast and axilla that was pruritic and erythematous. There was some weeping as well. It eventually spread up into the right neck and down into the upper abdomen as well. She was seen by dermatology on 10/31/18 and was diagnosed with acute contact dermatitis. She was treated with steroids and the rash has significantly improved. She has a follow-up appointment with dermatology later today, for consideration of patch testing. Her OncoType score was 13 and thus no adjuvant chemotherapy was recommended. She completed adjuvant radiation therapy at [facility] with Dr. Blevins and is here today to discuss adjuvant endocrine therapy.     Lauren reports that overall she has been feeling very well.  She had some mild redness in the breast tissue after radiation however this has resolved completely.  She denies any pain, swelling, tenderness, range of motion issues on her right side.  No new abnormalities on the left.  She denies fevers chills sweats.  She did see her family practice practitioner on January 21, complaining  Regarding: WI-ulcerative colitis flare up, diarrhea. Calling again looking for medical advice  ----- Message from Stephanie Lejeune sent at 9/1/2023 12:44 PM CDT -----  Patient Name: Leoncio Painting  Caller: pt  Call Back # : [phone]    Is this for an Active episode for Home Health, Hospice or Palliative Care? No   Symptomatic: Yes   Specialist or PCP Name: Carlota Lindsey  Symptoms: ulcerative colitis flare up, diarrhea. Calling again looking for medical advice  Pregnant (females aged 13-60. If Yes, how long?) : na  Name of Clinic Site / Acct# : [facility]    Use following scripting for patients waiting for a callback:   \"Nurse callback times vary based on call volumes; please be aware the return phone call may come from an unidentified or out of state phone number. If your symptoms worsen or become life threatening while waiting, you should seek immediate assistance by calling 911 or going to the ER for evaluation.\"          Are you currently at (or near) your place of residence? Yes [facility] [id number]     "of some right lower quadrant abdominal pain that was sharp and severe in nature.  They discussed getting a CT versus ultrasound as she has a history of ovarian cysts, but ultimately decided to wait a couple more days.  She reports that a day later she experienced a bout of diarrhea overnight with about 5 stools and the next day started her menstrual period.  The pain completely resolved at that time and has not returned.  She has had normal bowel movements since.    Past Medical/Surgical History:  Past Medical History:   Diagnosis Date     Allergic rhinitis      Chronic cough      GERD (gastroesophageal reflux disease)      Hypothyroidism      Past Surgical History:   Procedure Laterality Date     CHOLECYSTECTOMY       L salpingooophorectomy  2015     LUMPECTOMY BREAST WITH SENTINEL NODE, COMBINED Right 10/24/2018    Procedure: Right Wire Localized Segmental Mastectomy (Lumpectomy), Right [place] Lymph Node Biopsy;  Surgeon: Kalina Oviedo MD;  [facility]     Menstrual History:   Menarche around age 15.  Three pregnancies, uncomplicated.   the first two children.  Was not on OCPs until her mid-40s, at which time she started \"low-dose\" combined OCPs for painful periods.  Still having relatively regular monthly cycles, some just spotting, others \"normal periods\".    Allergies:  Allergies as of 01/28/2019 - Reviewed 11/23/2018   Allergen Reaction Noted     Seasonal allergies  10/24/2018     Benzoyl peroxide Rash 11/30/2018     Mecrylate Rash 11/30/2018     Other  [no clinical screening - see comments] Rash 11/30/2018     Current Medications:  Current Outpatient Medications   Medication Sig Dispense Refill     ACETAMINOPHEN PO Take 1,000 mg by mouth       albuterol (PROAIR HFA/PROVENTIL HFA/VENTOLIN HFA) 108 (90 Base) MCG/ACT inhaler Inhale 2-4 puffs into the lungs       chlorpheniramine (CHLOR-TRIMETON) 4 MG tablet Take 4 mg by mouth        diphenhydrAMINE (BENADRYL) 25 MG tablet        emollient " "(VANICREAM) cream Apply topically as needed for other (for dry skin) 57 g 0     fluticasone (FLONASE) 50 MCG/ACT spray INSTILL 2 SPRAYS INTO BOTH NOSTRILS DAILY.       ibuprofen (ADVIL/MOTRIN) 200 MG tablet Take 200 mg by mouth       levothyroxine (SYNTHROID/LEVOTHROID) 100 MCG tablet Take 100 mcg by mouth every morning        ranitidine (ZANTAC) 150 MG tablet Take 150 mg by mouth 2 times daily  60 tablet 1     cephALEXin (KEFLEX) 500 MG capsule Take 1 capsule (500 mg) by mouth 4 times daily (Patient not taking: Reported on 11/19/2018) 28 capsule 0     gentian violet 1 % solution Apply 0.5 mLs topically 3 times daily (Patient not taking: Reported on 11/6/2018) 59 mL 1     gentian violet 2 % solution Apply 1 mL topically 2 times daily For affected area 1 - 2 times a day. Apply to area before applying triamcinolone cream (Patient not taking: Reported on 11/6/2018) 59 mL 3     oxyCODONE IR (ROXICODONE) 5 MG tablet Take 1-2 tablets (5-10 mg) by mouth every 4 hours as needed for moderate to severe pain (Patient not taking: Reported on 11/6/2018) 6 tablet 0     senna-docusate (SENOKOT-S;PERICOLACE) 8.6-50 MG per tablet Take 1-2 tablets by mouth 2 times daily (Patient not taking: Reported on 11/6/2018) 30 tablet 0     triamcinolone (KENALOG) 0.1 % cream Apply topically 2 times daily (Patient not taking: Reported on 1/28/2019) 80 g 1       ROS: 10 point ROS neg other than the symptoms noted above in the HPI.      Physical Exam:  /83 (BP Location: Left arm, Patient Position: Chair, Cuff Size: Adult Regular)   Pulse 88   Temp 97.9  F (36.6  C) (Oral)   Resp 14   Ht 1.626 m (5' 4.02\")   Wt 82.6 kg (182 lb 1.6 oz)   SpO2 100%   BMI 31.24 kg/m      Gen: alert, pleasant and conversational, NAD  HEENT: NC/AT, EOMI w/ PERRL, anicteric sclera.  OP clear. MMM. Neck supple  Lymph: no palpable cervical, clavicular or axillary LAD.   CV: normal S1,S2 with RRR no m/r/g  Resp: lungs CTA bilaterally with adequate air movement " to bases. No wheezes or crackles  Abd: soft NTND no organomegaly or masses. BS normoactive.   Ext: WWP no edema or cyanosis  Skin: no concerning lesions or rashes. Right axilla with well-healed incisional scar and mild skin color changes.   Neuro: A&Ox4, no lateralizing sx. Grossly nonfocal.      Laboratory/Imaging Studies  Lab Results   Component Value Date    WBC 6.8 10/19/2018    HGB 13.3 10/19/2018    HCT 39.4 10/19/2018     10/19/2018     10/19/2018    BUN 10 10/19/2018    CO2 26 10/19/2018     SURGICAL PATHOLOGY, 10/24/18:  SPECIMEN(S):    A: Right breast mass    B: Right axillary sentinal lymph node #1    C: Right breast, new inferior margin   FINAL DIAGNOSIS:   A. Right breast, lower outer quadrant, wire localized segmental mastectomy:   - INVASIVE MAMMARY CARCINOMA OF NO SPECIAL TYPE (INVASIVE DUCTAL CARCINOMA), JAJA GRADE 1, two foci   - Focus of invasive carcinoma with prior core biopsy site: size 18 mm   - Focus of invasive carcinoma slightly anterior and superior to first focus: size 15 mm   - Ductal carcinoma in situ (DCIS), nuclear grade 2, solid, cribriform and papillary types, with lobular involvement and comedonecrosis   - DCIS involves an intraductal papilloma   - DCIS is admixed with and adjacent to both foci of invasive carcinoma, and spans an estimated 3.5 cm (extensive intraductal component)   - No lymphovascular invasion identified   - The inferior margin of this specimen is involved by invasive carcinoma and DCIS (not a final margin, see specimen C)   - Invasive carcinoma is 1 mm from the superior margin, 4 mm from the posterior margin, 5 mm from the medial margin and > 5 mm from the anterior and lateral margins   - DCIS is 2 mm from the superior margin, 3.5 mm from the medial margin, 5 mm from the posterior margin and > 5 mm from the anterior and lateral margins   - Flat epithelial atypia   - Other findings: columnar cell change, intraductal papillomas, fibrocystic change  (including microcysts with apocrine metaplasia) and usual ductal hyperplasia   - Calcifications associated with DCIS   - Prior core biopsy site changes   - Larger focus of invasive carcinoma is estrogen receptor positive (95%, strong intensity), progesterone receptor positive (98%, strong intensity) and reportedly HER2 equivocal (2+ per outside report) by immunohistochemistry and is reportedly HER2 non-amplified by FISH (HER2:CEN17 ratio 1.06 per outside report)   - HER2 FISH for the smaller focus of invasive carcinoma will be reported separately by cytogenetics   - See comment for tumor synoptic   - See microscopic description     B. Lymph node, right axillary, sentinel # 1, excision:   - One benign lymph node (0/1)     C. Right breast, new inferior margin, excision:   - Atypical ductal hyperplasia   - Fibrocystic change (including microcysts with apocrine metaplasia   - No residual invasive or in situ carcinoma     COMMENT:   Invasive carcinoma and DCIS are 10 mm from the final inferior margin, because the new inferior margin (specimen C) is 10 mm thick and is uninvolved by invasive carcinoma and DCIS.     ASSESSMENT/PLAN:  Gabriella Villarreal is a 53 year old woman with recently diagnosed Stage IA invasive mammary carcinoma involving the right breast, ER+/AR+ and Her2 non-amplified (pT1c(2)N0). She underwent wire-localized right-sided segmental mastectomy with Dr. Kalina Oviedo on 10/24/18. Her course was complicated by an intensely pruritic rash, which started in the right axilla and spread to the right breast, up to the neck and into the upper right abdomen. This was diagnosed as acute contact dermatitis and resolved with a course of steroids.     #  Breast cancer - ER+/AR+ and Her2 non-amplified (pT1c(2)N0). OncoTypeDX score is 13, indicating low-risk of distant recurrence and no additional benefit of chemotherapy. With her hormone-receptor positive disease, and perimenopausal status we recommend adjuvant endocrine  therapy with Tamoxifen after radiation, which she completed on January 4th. We re-reviewed risks and benefits of adjuvant enodcrine therapy. Benefits include reducing the risk of tumor recurrence. Risk include hot flashes, night sweats, mood changes, nausea. She agreed to proceed with Tamoxifen at 20mg. She will let us know how she is doing if she is having significant side effect.s She will need a baseline DEXA scan.     We will schedule her to RTC with HATTIE for survivorship visit and review DEXA in 3 months to check on her progress with Tamoxifen. Follow up with Dr. Berrios in 6 months w/ mammograms.      Pt seen and examined with Dr. Yash Hastings MD   Hematology / Oncology Fellow  P: [phone]    The patient was seen and evaluated by me with Dr. Oliver Hastings. Please see above note for complete consultation.      Lauren has completed breast radiation for her T1cN0, ER positive, OK positive, HER-2 negative right breast cancer.  She tolerated this well.  On clinical examination, she has mild erythema over her right axilla and under her right breast.  No warmth, no nodules or masses.      We reviewed with Lauren today that I would recommend adjuvant tamoxifen.  She remains perimenopausal.  We discussed using tamoxifen at 20 mg daily.  Side effects were discussed in detail.  Consent was obtained.      We will obtain a baseline DEXA scan.  She will return in 3 months for a Survivorship visit and 6 months with a mammogram.     Again, thank you for allowing me to participate in the care of your patient.      Sincerely,    Karen Berrios MD

## 2023-10-26 ENCOUNTER — ANCILLARY PROCEDURE (OUTPATIENT)
Dept: MAMMOGRAPHY | Facility: CLINIC | Age: 58
End: 2023-10-26
Payer: COMMERCIAL

## 2023-10-26 ENCOUNTER — ONCOLOGY VISIT (OUTPATIENT)
Dept: ONCOLOGY | Facility: CLINIC | Age: 58
End: 2023-10-26
Attending: INTERNAL MEDICINE
Payer: COMMERCIAL

## 2023-10-26 VITALS
BODY MASS INDEX: 30.61 KG/M2 | DIASTOLIC BLOOD PRESSURE: 84 MMHG | HEART RATE: 58 BPM | TEMPERATURE: 98.6 F | HEIGHT: 64 IN | WEIGHT: 179.3 LBS | RESPIRATION RATE: 16 BRPM | OXYGEN SATURATION: 100 % | SYSTOLIC BLOOD PRESSURE: 124 MMHG

## 2023-10-26 DIAGNOSIS — Z23 NEED FOR PROPHYLACTIC VACCINATION AND INOCULATION AGAINST INFLUENZA: ICD-10-CM

## 2023-10-26 DIAGNOSIS — Z17.0 MALIGNANT NEOPLASM OF UPPER-OUTER QUADRANT OF RIGHT BREAST IN FEMALE, ESTROGEN RECEPTOR POSITIVE (H): Primary | ICD-10-CM

## 2023-10-26 DIAGNOSIS — Z12.31 VISIT FOR SCREENING MAMMOGRAM: ICD-10-CM

## 2023-10-26 DIAGNOSIS — C50.411 MALIGNANT NEOPLASM OF UPPER-OUTER QUADRANT OF RIGHT BREAST IN FEMALE, ESTROGEN RECEPTOR POSITIVE (H): Primary | ICD-10-CM

## 2023-10-26 DIAGNOSIS — Z79.811 USE OF AROMATASE INHIBITORS: ICD-10-CM

## 2023-10-26 PROCEDURE — G0008 ADMIN INFLUENZA VIRUS VAC: HCPCS | Performed by: INTERNAL MEDICINE

## 2023-10-26 PROCEDURE — 250N000011 HC RX IP 250 OP 636: Performed by: INTERNAL MEDICINE

## 2023-10-26 PROCEDURE — 99213 OFFICE O/P EST LOW 20 MIN: CPT | Performed by: INTERNAL MEDICINE

## 2023-10-26 PROCEDURE — 90682 RIV4 VACC RECOMBINANT DNA IM: CPT | Performed by: INTERNAL MEDICINE

## 2023-10-26 PROCEDURE — 77063 BREAST TOMOSYNTHESIS BI: CPT | Mod: GC | Performed by: RADIOLOGY

## 2023-10-26 PROCEDURE — 99213 OFFICE O/P EST LOW 20 MIN: CPT | Mod: 25 | Performed by: INTERNAL MEDICINE

## 2023-10-26 PROCEDURE — 77067 SCR MAMMO BI INCL CAD: CPT | Mod: GC | Performed by: RADIOLOGY

## 2023-10-26 RX ADMIN — INFLUENZA A VIRUS A/WEST VIRGINIA/30/2022 (H1N1) RECOMBINANT HEMAGGLUTININ ANTIGEN, INFLUENZA A VIRUS A/DARWIN/6/2021 (H3N2) RECOMBINANT HEMAGGLUTININ ANTIGEN, INFLUENZA B VIRUS B/AUSTRIA/1359417/2021 RECOMBINANT HEMAGGLUTININ ANTIGEN, AND INFLUENZA B VIRUS B/PHUKET/3073/2013 RECOMBINANT HEMAGGLUTININ ANTIGEN 0.5 ML: 45; 45; 45; 45 INJECTION INTRAMUSCULAR at 10:01

## 2023-10-26 ASSESSMENT — PAIN SCALES - GENERAL: PAINLEVEL: NO PAIN (0)

## 2023-10-26 NOTE — NURSING NOTE
"Oncology Rooming Note    October 26, 2023 9:04 AM   Gabriella Villarreal is a 58 year old female who presents for:    Chief Complaint   Patient presents with    Oncology Clinic Visit     Visit for screening mammogram      Initial Vitals: /84 (BP Location: Right arm, Patient Position: Sitting, Cuff Size: Adult Regular)   Pulse 58   Temp 98.6  F (37  C) (Oral)   Resp 16   Ht 1.625 m (5' 3.98\")   Wt 81.3 kg (179 lb 4.8 oz)   SpO2 100%   BMI 30.80 kg/m   Estimated body mass index is 30.8 kg/m  as calculated from the following:    Height as of this encounter: 1.625 m (5' 3.98\").    Weight as of this encounter: 81.3 kg (179 lb 4.8 oz). Body surface area is 1.92 meters squared.  No Pain (0) Comment: Data Unavailable   No LMP recorded. Patient is perimenopausal.  Allergies reviewed: Yes  Medications reviewed: Yes    Medications: Medication refills not needed today.  Pharmacy name entered into EPIC:    [facility] - [place], [place] - [address] [place] [address] [phone]  [id number] [place] [address] [address] [facility] [address] [place] [address] [place] [facility] - [place], [place] - [address] [place], [place] [address]E DRIVE  [facility] [place] - [address] [facility] AT PORTAL TO REGISTERED [facility] SITES    Clinical concerns: none       Adriana Bradshaw              "

## 2023-10-26 NOTE — PROGRESS NOTES
"Oncology Visit  Date on this visit: 10/26/2023      Diagnosis: Invasive mammary carcinoma of the right breast.     Treatment to date:  1. Right wire-localized segmental mastectomy and sentinel lymph node biopsy (10/24/2018) T1c(2)N0  2. Oncotype DX 13  3. Adjuvant radiation therapy with Dr Blevins (12/5/2018 to 12/28/2018)  4. Adjuvant tamoxifen (2/4/2019 to ongoing)  5. Switched to letrozole - Dec 2021    Interval History:   Lauren returns to clinic for follow-up of breast cancer.  She had her mammogram today.  Last bone density scan Oct 2022.  She remains on letrozole with few side effects.  She thinks overall that the letrozole is being tolerated better than tamoxifen was.    She previously saw OB/GYN last winter for postmenopausal bleeding.  She had a pelvic ultrasound and subsequent biopsy which was ultimately benign.  SHe continues to see them.    She is feeling well.  No pain.  No f/c.  No chest pain or shortness of breath.  No n/v/d/c.      No new medical problems.      ROS: 10 point ROS neg other than the symptoms noted above in the HPI.      Past Medical History:   Diagnosis Date     Allergic rhinitis      Chronic cough      GERD (gastroesophageal reflux disease)      Hypothyroidism      Past Surgical History:   Procedure Laterality Date     CHOLECYSTECTOMY       L salpingooophorectomy  2015     LUMPECTOMY BREAST WITH SENTINEL NODE, COMBINED Right 10/24/2018    Procedure: Right Wire Localized Segmental Mastectomy (Lumpectomy), Right [place] Lymph Node Biopsy;  Surgeon: Kalina Oviedo MD;  [facility]         OBJECTIVE     Physical Exam  /84 (BP Location: Right arm, Patient Position: Sitting, Cuff Size: Adult Regular)   Pulse 58   Temp 98.6  F (37  C) (Oral)   Resp 16   Ht 1.625 m (5' 3.98\")   Wt 81.3 kg (179 lb 4.8 oz)   SpO2 100%   BMI 30.80 kg/m     GENERAL:  Alert, no acute distress  HEAD/NECK:  PERRL, EOMI, no scleral icterus  LYMPH NODES: No palpable cervical, axillary, " abdominal or inguinal lymph nodes  BREAST: Well healed right segmental mastectomy scar. No palpable lumps on b/l breast exam.   LUNGS:  Clear to auscultation b/l, no wheezes or crackles  CARDIOVASCULAR:  Regular rate and rhythm, normal S1, S2 without rub, gallop or murmur  EXTREMITIES:  No lower extremity edema.   NEURO: CN 3-12 grossly intact, equal motor strength in major muscle groups of all four extremities  SKIN: No rashes  PSYCH: Normal affect     Labs/Imaging: Mammogram completed today. Report benign    We reviewed her dexa scan personally, and with the patient demonstrating osteopenia with 7-10% loss in the last three years.  Results   Lumbar spine   T-score -2.2 (L1-4), BMD is 0.920 g/cm2     Left femoral neck  T-score -1.5     Right femoral neck  T-score -1.2     Left total femur  T-score -0.6 , BMD is 0.937 g/cm2     Right total femur  T-score -0.7, BMD is 0.915 g/cm2      ASSESSMENT/PLAN:  Gabriella Villarreal is a 57 year old woman with recently diagnosed Stage IA invasive mammary carcinoma involving the right breast, ER+/CO+ and Her2 non-amplified (pT1c(2)N0). She underwent wire-localized right-sided segmental mastectomy with Dr. Kalina Oviedo on 10/24/18.    She was on Tamoxifen for adjuvant endocrine therapy since (2/4/19), and then switched to an AI Jan 2022.     #  Breast cancer - ER+/CO+ and Her2 non-amplified (pT1c(2)N0). OncoTypeDX score is 13, indicating low-risk of distant recurrence and no additional benefit of chemotherapy.  We reviewed her mammogram demonstrating no abnormalities.  Will plan on continuing letrozole.      Mammogram in 1 year.    We discussed the duration of endocrine therapy.  I discussed the results from the MA 17 looking at 5 vs 10 years of endocrine therapy.  We also discussed the NEJ publication by Na bazan al looking at 7 vs 10 years of AI.  After our discussion reviewing the pros and concs, we will plan on continuing letrozole, likely for 7 years.     # Bone Health - we  discussed repeating dexa to look at bone health.  THis may impact our overall discussion on duration of endocrine therapy.      # Postmenopausal bleeding -  Resolved.  She continues to see gyn.     # Lifestyle - Encourage regular exercise as well as maintaining healthy weight, diet.    Karen Berrios MD

## 2023-10-26 NOTE — LETTER
10/26/2023         RE: Gabriella Villarreal  [address]  [place] [address]        Dear Colleague,    Thank you for referring your patient, Gabriella Villarreal, to the [facility]. Please see a copy of my visit note below.    Oncology Visit  Date on this visit: 10/26/2023      Diagnosis: Invasive mammary carcinoma of the right breast.     Treatment to date:  1. Right wire-localized segmental mastectomy and sentinel lymph node biopsy (10/24/2018) T1c(2)N0  2. Oncotype DX 13  3. Adjuvant radiation therapy with Dr Blevins (12/5/2018 to 12/28/2018)  4. Adjuvant tamoxifen (2/4/2019 to ongoing)  5. Switched to letrozole - Dec 2021    Interval History:   Lauren returns to clinic for follow-up of breast cancer.  She had her mammogram today.  Last bone density scan Oct 2022.  She remains on letrozole with few side effects.  She thinks overall that the letrozole is being tolerated better than tamoxifen was.    She previously saw OB/GYN last winter for postmenopausal bleeding.  She had a pelvic ultrasound and subsequent biopsy which was ultimately benign.  SHe continues to see them.    She is feeling well.  No pain.  No f/c.  No chest pain or shortness of breath.  No n/v/d/c.      No new medical problems.      ROS: 10 point ROS neg other than the symptoms noted above in the HPI.      Past Medical History:   Diagnosis Date    Allergic rhinitis     Chronic cough     GERD (gastroesophageal reflux disease)     Hypothyroidism      Past Surgical History:   Procedure Laterality Date    CHOLECYSTECTOMY      L salpingooophorectomy  2015    LUMPECTOMY BREAST WITH SENTINEL NODE, COMBINED Right 10/24/2018    Procedure: Right Wire Localized Segmental Mastectomy (Lumpectomy), Right [place] Lymph Node Biopsy;  Surgeon: Kalina Oviedo MD;  [facility]         OBJECTIVE     Physical Exam  /84 (BP Location: Right arm, Patient Position: Sitting, Cuff Size: Adult Regular)   Pulse 58   Temp  "98.6  F (37  C) (Oral)   Resp 16   Ht 1.625 m (5' 3.98\")   Wt 81.3 kg (179 lb 4.8 oz)   SpO2 100%   BMI 30.80 kg/m     GENERAL:  Alert, no acute distress  HEAD/NECK:  PERRL, EOMI, no scleral icterus  LYMPH NODES: No palpable cervical, axillary, abdominal or inguinal lymph nodes  BREAST: Well healed right segmental mastectomy scar. No palpable lumps on b/l breast exam.   LUNGS:  Clear to auscultation b/l, no wheezes or crackles  CARDIOVASCULAR:  Regular rate and rhythm, normal S1, S2 without rub, gallop or murmur  EXTREMITIES:  No lower extremity edema.   NEURO: CN 3-12 grossly intact, equal motor strength in major muscle groups of all four extremities  SKIN: No rashes  PSYCH: Normal affect     Labs/Imaging: Mammogram completed today. Report benign    We reviewed her dexa scan personally, and with the patient demonstrating osteopenia with 7-10% loss in the last three years.  Results   Lumbar spine   T-score -2.2 (L1-4), BMD is 0.920 g/cm2     Left femoral neck  T-score -1.5     Right femoral neck  T-score -1.2     Left total femur  T-score -0.6 , BMD is 0.937 g/cm2     Right total femur  T-score -0.7, BMD is 0.915 g/cm2      ASSESSMENT/PLAN:  Gabriella Villarreal is a 57 year old woman with recently diagnosed Stage IA invasive mammary carcinoma involving the right breast, ER+/MD+ and Her2 non-amplified (pT1c(2)N0). She underwent wire-localized right-sided segmental mastectomy with Dr. Kalina Oviedo on 10/24/18.    She was on Tamoxifen for adjuvant endocrine therapy since (2/4/19), and then switched to an AI Jan 2022.     #  Breast cancer - ER+/MD+ and Her2 non-amplified (pT1c(2)N0). OncoTypeDX score is 13, indicating low-risk of distant recurrence and no additional benefit of chemotherapy.  We reviewed her mammogram demonstrating no abnormalities.  Will plan on continuing letrozole.      Mammogram in 1 year.    We discussed the duration of endocrine therapy.  I discussed the results from the MA 17 looking at 5 vs 10 " years of endocrine therapy.  We also discussed the NEJM publication by Na et al looking at 7 vs 10 years of AI.  After our discussion reviewing the pros and concs, we will plan on continuing letrozole, likely for 7 years.     # Bone Health - we discussed repeating dexa to look at bone health.  THis may impact our overall discussion on duration of endocrine therapy.      # Postmenopausal bleeding -  Resolved.  She continues to see gyn.     # Lifestyle - Encourage regular exercise as well as maintaining healthy weight, diet.    Karen Berrios MD

## 2024-02-16 DIAGNOSIS — Z17.0 MALIGNANT NEOPLASM OF UPPER-OUTER QUADRANT OF RIGHT BREAST IN FEMALE, ESTROGEN RECEPTOR POSITIVE (H): Primary | ICD-10-CM

## 2024-02-16 DIAGNOSIS — C50.411 MALIGNANT NEOPLASM OF UPPER-OUTER QUADRANT OF RIGHT BREAST IN FEMALE, ESTROGEN RECEPTOR POSITIVE (H): Primary | ICD-10-CM

## 2024-07-14 ENCOUNTER — HEALTH MAINTENANCE LETTER (OUTPATIENT)
Age: 59
End: 2024-07-14

## 2024-07-27 DIAGNOSIS — C50.411 MALIGNANT NEOPLASM OF UPPER-OUTER QUADRANT OF RIGHT BREAST IN FEMALE, ESTROGEN RECEPTOR POSITIVE (H): ICD-10-CM

## 2024-07-27 DIAGNOSIS — Z17.0 MALIGNANT NEOPLASM OF UPPER-OUTER QUADRANT OF RIGHT BREAST IN FEMALE, ESTROGEN RECEPTOR POSITIVE (H): ICD-10-CM

## 2024-07-29 RX ORDER — LETROZOLE 2.5 MG/1
TABLET, FILM COATED ORAL
Qty: 90 TABLET | Refills: 3 | Status: SHIPPED | OUTPATIENT
Start: 2024-07-29

## 2024-07-31 NOTE — PATIENT INSTRUCTIONS
Please sign, samples attached      Preparing for Your Surgery      Name:  Gabriella Villarreal   MRN:  [id number]   :  1965   Today's Date:  10/19/2018     Arriving for surgery:  Surgery date:  10/24/2018  Arrival time:  8:30 am  Please come to:     [facility]  [address] [place] [id number]     Parking is available in front of the Clinics and Surgery Center building from 5:30AM to 8:00PM.  -  Proceed to the 5th floor to check into the Ambulatory Surgery Center.              >> There will be patient concierges on the 1st and 5th floor, for assistance or an escort, if you would like.              >> Please call [phone] with any questions.    What can I eat or drink?  -  You may have solid food or milk products until 8 hours prior to your surgery. (midnight)  -  You may have water, apple juice or 7up/Sprite until 2 hours prior to your surgery. ( until 8:30 am arrival time)    Which medicines can I take?        Stop Aspirin, vitamins and supplements one week prior to surgery.      Hold Ibuprofen and Naproxen for 24 hours prior to surgery.       -  Please take these medications the day of surgery:     Levothyroxine      Flonase (fluticansone)     Inhalers as usual and bring to surgery center            How do I prepare myself?  -  Take two showers: one the night before surgery; and one the morning of surgery.         Use Scrubcare or Hibiclens to wash from neck down.  You may use your own  shampoo and conditioner. No other hair products.   -  Do NOT use lotion, powder, deodorant, or antiperspirant the day of your surgery.  -  Do NOT wear any makeup, fingernail polish or jewelry.  - Do not bring your own medications to the hospital, except for inhalers and eye drops.  -  Bring your ID and insurance card.    Questions or Concerns:    -If you are scheduled at the Ambulatory Surgery Center please call [phone].    -For questions after surgery please call your surgeons office.

## 2024-08-17 NOTE — PROGRESS NOTES
NEW CONSULTATION  Sep 28, 2018    Gabriella Villarreal is a 53 year old woman who presents with a right  breast complaint.  She was self-referred.    HPI:    She noted no masses in either breast, axilla, or neck. She denies any nipple discharge or nipple inversion.    Imaging showed a 1.2 x 1.2 x 0.8 cm mass at 9:00 in the RIGHT breast.    A biopsy was performed and a clip was placed.  It showed invasive ductal carcinoma, grade 2, ER+ VA+ HER2/sirena equivocal.      She has a bilateral breast MRI ordered that will take place this evening.    BREAST-SPECIFIC HISTORY:  Prior breast biopsies: Yes, left breast in 2016 - benign  Prior breast surgeries: No  Prior radiation history: No  Hormone replacement therapy: No  Bra size: 36D  Dominant hand: Left    GYN HISTORY:    Age at 1st pregnancy: 31  Age at menarche: 14  Breastfeeding history: Yes  Menopausal? Perimenopausal - currently on OCP; L oophorectomy for benign cyst ()    FAMILY HISTORY:  Breast ca: No  Ovarian ca: Yes, paternal cousin (dx 35), maternal aunt (dx 64)  Pancreatic ca: No  Gastric ca: No  Melanoma: No  Colon ca: No  Other cancer: Yes, MGF w lung ca (smoker)    Past Medical History:   Diagnosis Date     Chronic cough      Hypothyroidism        Past Surgical History:   Procedure Laterality Date     CHOLECYSTECTOMY       L salpingooophorectomy       No GA issues    Current Outpatient Prescriptions   Medication Sig Dispense Refill     albuterol (PROAIR HFA/PROVENTIL HFA/VENTOLIN HFA) 108 (90 Base) MCG/ACT inhaler Inhale 2-4 puffs into the lungs       fluticasone (FLONASE) 50 MCG/ACT spray INSTILL 2 SPRAYS INTO BOTH NOSTRILS DAILY.       chlorpheniramine (CHLOR-TRIMETON) 4 MG tablet        diphenhydrAMINE (BENADRYL) 25 MG tablet        ibuprofen (ADVIL/MOTRIN) 200 MG tablet Take 200 mg by mouth       levothyroxine (SYNTHROID/LEVOTHROID) 100 MCG tablet            No Known Allergies     SOCIAL HISTORY:  Smokes: No  EtOH: Yes, 2 drinks per week  Illicit  "drugs: No    She works as a  for [employer].    ROS:  Back pain: No  Headache: Yes - mild attributes to stress, not nocturnal, spontaneously resolves  Abdominal pain: No  Unexpected weight loss: No  Easy bruising/bleeding: No    /80 (BP Location: Right arm, Patient Position: Chair, Cuff Size: Adult Large)  Pulse 93  Temp 97.4  F (36.3  C) (Oral)  Resp 16  Ht 1.626 m (5' 4\")  Wt 77.6 kg (171 lb)  SpO2 98%  BMI 29.35 kg/m2   Physical Exam   Constitutional: She is well-developed, well-nourished, and in no distress.   Pulmonary/Chest: Effort normal. No respiratory distress. Right breast exhibits no inverted nipple, no mass, no nipple discharge, no skin change and no tenderness. Left breast exhibits no inverted nipple, no mass, no nipple discharge, no skin change and no tenderness.       Patient was examined in both upright and supine positions.   Lymphadenopathy:     She has no cervical adenopathy.        Right cervical: No superficial cervical, no deep cervical and no posterior cervical adenopathy present.       Left cervical: No superficial cervical, no deep cervical and no posterior cervical adenopathy present.     She has no axillary adenopathy.        Right axillary: No pectoral and no lateral adenopathy present.        Left axillary: No pectoral and no lateral adenopathy present.       Right: No supraclavicular adenopathy present.        Left: No supraclavicular adenopathy present.   No lymphedema in bilateral upper extremities.    Skin: Skin is warm and dry.        INVESTIGATIONS:    Diagnostic Mammogram & Ultrasound from [facility] (9/19/2018) showed:  Right full-field digital diagnostic mammogram performed. There are scattered areas of fibroglandular density.  Additional mammographic views including spot compression in the craniocaudal and mediolateral oblique projections were obtained. The additional images confirm presence of a spiculated density at the 9:00 position middle " depth with associated architectural distortion.    Targeted right breast ultrasound was performed by the ultrasonographer and by the radiologist.  In the right breat at the 9:00 position, there is an irregularly marginated hypoechoic solid area 1.2 x 1.2 x 0.8 cm.  This corresponds to the density on mammogram and is suspicious for malignancy. No lymphadenopathy is seen.  BI-RADS 4    Screening Mammogram (9/13/2018) showed:  FINDINGS:  The breasts have scattered areas of fibroglandular density. There is an asymmetry in the right breast at the 9:00 position at middle depth. There are benign appearing calcifications.  BI-RADS 0    I personally reviewed her images with our in-house breast radiologist today. The spiculated mass measures slightly larger than the mass seen on ultrasound. In addition, on the ultrasound images, there appears to be additional hypoechoic areas immediately adjacent to the cancer, totaling an area of 3.2 cm.    Biopsy from [facility] (9/19/2018) showed:  Breast, right 9:00, zone 2, needle core biopsy  Invasive ductal carcionma, grade 1  ER positive (>75%)  AZ positive (>75%)  HER2/sirena equivocal (2+ on IHC)    ASSESSMENT:  Gabriella Villarreal is a 53 year old woman with right breast cancer.    I reviewed the imaging, diagnosis, staging, and management of breast cancer with Gabriella Villarreal and her , Raghu.      It is not entirely clear the T stage of her tumor given the imaging review performed today.  We discussed that she has a Stage I breast cancer; the MRI tonight will further determine the size of the primary tumor (1.2 cm vs 3 cm).  We discussed that if the tumor was >2.5 cm, then she would be eligible for the I-SPY2 clinical trial here.    The cancer was HER2/sirena equivocal on IHC; the FISH has been ordered at the outside facility and the result is pending.  We reviewed that if she had a HER2/sirena amplified breast cancer, then she would be a candidate for systemic therapy  "(chemotherapy + targeted therapy). We discussed the advantages of neoadjuvant systemic therapy in this setting.  Gabriella Villarreal will phone her local facility to follow up on her HER2/sirena result.  If the cancer is HER2/sirena non-amplified, then the decision regarding adjuvant chemotherapy will be made after surgery.    The mainstay of treatment for resectable breast cancer is surgical resection, in the form of either breast conservation (segmental mastectomy plus radiation) or mastectomy.  We reviewed that the two strategies are equivalent in terms of overall survival.  The advantages and disadvantages of each were discussed.   Gabriella Villarreal IS a candidate for breast conservation therapy.  We discussed that this involves two necessary components: the lumpectomy (or \"segmental mastectomy\"), and 6 weeks of whole breast radiation therapy.  We discussed that the overall survival after breast conservation therapy is identical to mastectomy and that local recurrence rates are significantly higher if segmental mastectomy was performed without subsequent radiation.  We also discussed the significance of clear margins and that a subsequent procedure may be necessary to achieve this.    Because the lesion is not palpable, a wire-localized approach would be taken for breast conservation.  She would present on the day of surgery for an image-guided wire placement, followed by a surgical excision in the operating room.  The risks of a wire-localized segmental mastectomy were discussed with the patient and family, including the risks of bleeding, wound infection, wound dehiscence, and post-operative contour change to the breast.      Alternatively, we also discussed the various types of mastectomy, including total, skin-sparing, and nipple-sparing mastectomy.  We reviewed that the nipple-sparing technique is cosmetic; sensation and contractility will likely be lost.  Gabriella Villarreal is not an ideal candidate for nipple-sparing " mastectomy due to relative large size of her breasts.  The risks of a mastectomy were discussed with the patient and family, including the risks of bleeding, wound infection, wound dehiscence, skin flap/nipple necrosis, and seroma formation.    The option of having immediate versus delayed reconstruction was also discussed.   We reviewed that the advantages of immediate reconstruction includes superior cosmetics, as the skin is preserved.  However, the major disadvantage is increased postoperative risks, including skin flap ischemia and expander infection, which can potentially delay adjuvant oncologic treatments which may be needed post-surgically. Gabriella Villarreal is leaning towards breast conservation.      In addition to the surgical management of the breast, a sentinel lymph node biopsy is recommended for ryan staging of the axilla.  This is performed with the combination of the radioactive colloid and lymphazurin. The risks of a sentinel lymph node biopsy were discussed with the patient and family, including the risks of lymphedema (5-10%), bleeding, wound infection, wound dehiscence, seroma formation, and paresthesias. There is also a small risk of anaphylaxis with lymphazurin injection as part of the procedure. There is an approximately 10% false negative rate associated with sentinel lymph node biopsy as published in the literature.  The findings of the sentinel lymph node biopsy may result in the need for further surgery (i.e. Axillary lymph node dissection) or radiation. There is a 5-10% chance of patients whose sentinel lymph nodes do not map despite dual tracer (radiocolloid and lymphazurin). Should this be the case, we discussed that I would proceed with an axillary lymph node dissection at the index procedure.  The higher risks of an axillary lymph node dissection were also reviewed, including lymphedema (20-30%), bleeding, wound infection, wound dehiscence, seroma formation, nerve injury, limited  arm range of motion and paresthesias. We discussed that a drain would be placed intra-operatively should an axillary lymph node dissection be performed.     In addition, she has a significant family history of ovarian cancer.  I have recommended genetic counseling +/- testing.  The natural history of BRCA mutations and breast cancer were discussed with the patient. Should a deleterious mutation be identified, she would no longer be a good candidate for breast conservation.  We also reviewed the risk reduction benefits of a prophylactic mastectomy in this situation.     Finally, we discussed that she has an ER-positive breast cancer, and oral contraceptive pills are contraindicated. She understands and will stop taking the OCP.    All of the above was discussed with Gabriella Villarreal and all questions were answered.  She elected to proceed with genetic counseling +/- testing.  She currently prefers breast conservation, but will likely opt for bilateral mastectomy if her genetic testing demonstrates a deleterious BRCA mutation.    Total time spent with the patient was 90 minutes, of which more than half was counseling.     PLAN:  1. Breast MRI tonight to clarify T stage  2. If HER2 amplified, neoadjuvant systemic therapy  3. If cancer >2.5 cm and HER2 non-amplified, referral to medical oncology for possible I-SPY2  4. Genetic counseling +/- testing  5. Patient currently prefers breast conservation (RIGHT wire-localized segmental mastectomy, RIGHT axillary sentinel lymph node biopsy, possible axillary lymph node dissection)  6. Stop oral contraceptive pills    Kalina Oviedo MD MSc [facility] FACS    Division of Surgical Oncology  [facility]    [Fatigue] : fatigue [Diarrhea: Grade 0] : Diarrhea: Grade 0 [Negative] : Allergic/Immunologic [Fever] : no fever [Chills] : no chills [Night Sweats] : no night sweats [Recent Change In Weight] : ~T no recent weight change [Eye Pain] : no eye pain [Vision Problems] : no vision problems [Dysphagia] : no dysphagia [Loss of Hearing] : no loss of hearing [Nosebleeds] : no nosebleeds [Hoarseness] : no hoarseness [Odynophagia] : no odynophagia [Mucosal Pain] : no mucosal pain [Skin Rash] : no skin rash [Skin Wound] : no skin wound [FreeTextEntry4] : ageusia and dry mouth  [de-identified] : mild swelling and discoloration of neck skin

## 2024-10-28 ENCOUNTER — ANCILLARY PROCEDURE (OUTPATIENT)
Dept: BONE DENSITY | Facility: CLINIC | Age: 59
End: 2024-10-28
Attending: INTERNAL MEDICINE
Payer: COMMERCIAL

## 2024-10-28 ENCOUNTER — ANCILLARY PROCEDURE (OUTPATIENT)
Dept: MAMMOGRAPHY | Facility: CLINIC | Age: 59
End: 2024-10-28
Attending: INTERNAL MEDICINE
Payer: COMMERCIAL

## 2024-10-28 ENCOUNTER — ONCOLOGY VISIT (OUTPATIENT)
Dept: ONCOLOGY | Facility: CLINIC | Age: 59
End: 2024-10-28
Attending: INTERNAL MEDICINE
Payer: COMMERCIAL

## 2024-10-28 VITALS
SYSTOLIC BLOOD PRESSURE: 118 MMHG | HEART RATE: 69 BPM | BODY MASS INDEX: 29.08 KG/M2 | TEMPERATURE: 98.6 F | DIASTOLIC BLOOD PRESSURE: 79 MMHG | RESPIRATION RATE: 16 BRPM | OXYGEN SATURATION: 99 % | WEIGHT: 169.3 LBS

## 2024-10-28 DIAGNOSIS — Z17.0 MALIGNANT NEOPLASM OF UPPER-OUTER QUADRANT OF RIGHT BREAST IN FEMALE, ESTROGEN RECEPTOR POSITIVE (H): ICD-10-CM

## 2024-10-28 DIAGNOSIS — Z79.811 USE OF AROMATASE INHIBITORS: ICD-10-CM

## 2024-10-28 DIAGNOSIS — C50.411 MALIGNANT NEOPLASM OF UPPER-OUTER QUADRANT OF RIGHT BREAST IN FEMALE, ESTROGEN RECEPTOR POSITIVE (H): Primary | ICD-10-CM

## 2024-10-28 DIAGNOSIS — C50.411 MALIGNANT NEOPLASM OF UPPER-OUTER QUADRANT OF RIGHT BREAST IN FEMALE, ESTROGEN RECEPTOR POSITIVE (H): ICD-10-CM

## 2024-10-28 DIAGNOSIS — Z17.0 MALIGNANT NEOPLASM OF UPPER-OUTER QUADRANT OF RIGHT BREAST IN FEMALE, ESTROGEN RECEPTOR POSITIVE (H): Primary | ICD-10-CM

## 2024-10-28 PROCEDURE — 77063 BREAST TOMOSYNTHESIS BI: CPT | Mod: GC | Performed by: RADIOLOGY

## 2024-10-28 PROCEDURE — G2211 COMPLEX E/M VISIT ADD ON: HCPCS | Performed by: INTERNAL MEDICINE

## 2024-10-28 PROCEDURE — 77067 SCR MAMMO BI INCL CAD: CPT | Mod: GC | Performed by: RADIOLOGY

## 2024-10-28 PROCEDURE — 99213 OFFICE O/P EST LOW 20 MIN: CPT | Performed by: INTERNAL MEDICINE

## 2024-10-28 PROCEDURE — 99214 OFFICE O/P EST MOD 30 MIN: CPT | Performed by: INTERNAL MEDICINE

## 2024-10-28 PROCEDURE — 77080 DXA BONE DENSITY AXIAL: CPT | Performed by: INTERNAL MEDICINE

## 2024-10-28 ASSESSMENT — PAIN SCALES - GENERAL: PAINLEVEL_OUTOF10: MILD PAIN (2)

## 2024-10-28 NOTE — LETTER
"10/28/2024      Gabriella Villarreal  [address]  [place] [address]      Dear Colleague,    Thank you for referring your patient, Gabriella Villarreal, to the [facility]. Please see a copy of my visit note below.    Oncology Visit  Date on this visit: 10/28/2024      Diagnosis: Invasive mammary carcinoma of the right breast.     Treatment to date:  1. Right wire-localized segmental mastectomy and sentinel lymph node biopsy (10/24/2018) T1c(2)N0  2. Oncotype DX 13  3. Adjuvant radiation therapy with Dr Blevins (12/5/2018 to 12/28/2018)  4. Adjuvant tamoxifen (2/4/2019 to ongoing)  5. Switched to letrozole - Dec 2021    Interval History:   Lauren returns to clinic for follow-up of breast cancer.  She had her mammogram and dexa today.       She remains on letrozole with few side effects.  She thinks overall that the letrozole is being tolerated better than tamoxifen was.    She has concerns about her bone health, as well as continuation of her letrozole.  Overall, she is noticing more arthralgias in her hips and knees.  She is feeling more stiff, and \"older.\"  + hot flashes.      She is feeling well.  No pain.  No f/c.  No chest pain or shortness of breath.  No n/v/d/c.      No new medical problems.      ROS: 10 point ROS neg other than the symptoms noted above in the HPI.      Past Medical History:   Diagnosis Date    Allergic rhinitis     Chronic cough     GERD (gastroesophageal reflux disease)     Hypothyroidism      Past Surgical History:   Procedure Laterality Date    CHOLECYSTECTOMY      L salpingooophorectomy  2015    LUMPECTOMY BREAST WITH SENTINEL NODE, COMBINED Right 10/24/2018    Procedure: Right Wire Localized Segmental Mastectomy (Lumpectomy), Right [place] Lymph Node Biopsy;  Surgeon: Kalina Oviedo MD;  Location: [facility] OR         OBJECTIVE     Physical Exam  /79 (BP Location: Right arm, Patient Position: Sitting, Cuff Size: Adult Regular)   Pulse 69   Temp " 98.6  F (37  C)   Resp 16   Wt 76.8 kg (169 lb 4.8 oz)   SpO2 99%   BMI 29.08 kg/m     GENERAL:  Alert, no acute distress  HEAD/NECK:  PERRL, EOMI, no scleral icterus  LYMPH NODES: No palpable cervical, axillary, abdominal or inguinal lymph nodes  BREAST: Well healed right segmental mastectomy scar. No palpable lumps on b/l breast exam.   LUNGS:  Clear to auscultation b/l, no wheezes or crackles  CARDIOVASCULAR:  Regular rate and rhythm, normal S1, S2 without rub, gallop or murmur  EXTREMITIES:  No lower extremity edema.   NEURO: CN 3-12 grossly intact, equal motor strength in major muscle groups of all four extremities  SKIN: No rashes  PSYCH: Normal affect     Labs/Imaging:      I reviewed her imaging with radiology personally, benign    We reviewed her dexa scan (final report pending); stable osteopenia in hips.  4% decrease in lumbar spine.  Osteoporosis.    ASSESSMENT/PLAN:  Gabriella Villarreal is a 59 year old woman with recently diagnosed Stage IA invasive mammary carcinoma involving the right breast, ER+/SC+ and Her2 non-amplified (pT1c(2)N0). She underwent wire-localized right-sided segmental mastectomy with Dr. Kalina Oviedo on 10/24/18.    She was on Tamoxifen for adjuvant endocrine therapy since (2/4/19), and then switched to an AI Jan 2022.     #  Breast cancer - ER+/SC+ and Her2 non-amplified (pT1c(2)N0). OncoTypeDX score is 13, indicating low-risk of distant recurrence and no additional benefit of chemotherapy.  We reviewed her mammogram demonstrating no abnormalities.  We discussed the pros and cons of remaining on endocrine therapy, as well as the concerns regarding her osteoporosis.  Ultimately, we discussed obtaining breast cancer index to help determine whether prolonged endocrine therapy would be beneficial.       I will call her with these results.      Return in 6 months.     # Bone Health - we reviewed her dexa as above.  Given her osteoporosis, if she remains on letrozole, I advised adding  zometa q 6 months x 3 years.  She does not want to take any medications for her bones.  She is working out avidly in the gym and is lifting weights three times per week.          The longitudinal plan of care for the diagnosis(es)/condition(s) as documented were addressed during this visit. Due to the added complexity in care, I will continue to support Lauren in the subsequent management and with ongoing continuity of care.          Again, thank you for allowing me to participate in the care of your patient.      Sincerely,    Karen Berrios MD

## 2024-10-28 NOTE — NURSING NOTE
"Oncology Rooming Note    October 28, 2024 9:46 AM   Gabriella Villarreal is a 59 year old female who presents for:    Chief Complaint   Patient presents with    Oncology Clinic Visit     Malignant neoplasm of upper-outer quadrant of right breast in female, estrogen receptor positive     Initial Vitals: /79 (BP Location: Right arm, Patient Position: Sitting, Cuff Size: Adult Regular)   Pulse 69   Temp 98.6  F (37  C)   Resp 16   Wt 76.8 kg (169 lb 4.8 oz)   SpO2 99%   BMI 29.08 kg/m   Estimated body mass index is 29.08 kg/m  as calculated from the following:    Height as of 10/26/23: 1.625 m (5' 3.98\").    Weight as of this encounter: 76.8 kg (169 lb 4.8 oz). Body surface area is 1.86 meters squared.  Mild Pain (2) Comment: worse in the morning, gets better later in the day   No LMP recorded. Patient is perimenopausal.  Allergies reviewed: Yes  Medications reviewed: Yes    Medications: MEDICATION REFILLS NEEDED TODAY. Provider was notified.  Pharmacy name entered into [facility]:    [facility] [place], [place] - [address] [place] [address] [phone]  [id number] [place] [phone] [facility] [address] [place] [address] [place] - [address] [place] [address] [facility] [place] - [facility] AT PORTAL TO REGISTERED [employer] [facility]    Frailty Screening:   Is the patient here for a new oncology consult visit in cancer care? 2. No      Clinical concerns: Refill: Letrozole      Jaclyn Hall, EMT  10/28/2024              "

## 2024-10-28 NOTE — PROGRESS NOTES
"Oncology Visit  Date on this visit: 10/28/2024      Diagnosis: Invasive mammary carcinoma of the right breast.     Treatment to date:  1. Right wire-localized segmental mastectomy and sentinel lymph node biopsy (10/24/2018) T1c(2)N0  2. Oncotype DX 13  3. Adjuvant radiation therapy with Dr Blevins (12/5/2018 to 12/28/2018)  4. Adjuvant tamoxifen (2/4/2019 to ongoing)  5. Switched to letrozole - Dec 2021    Interval History:   Lauren returns to clinic for follow-up of breast cancer.  She had her mammogram and dexa today.       She remains on letrozole with few side effects.  She thinks overall that the letrozole is being tolerated better than tamoxifen was.    She has concerns about her bone health, as well as continuation of her letrozole.  Overall, she is noticing more arthralgias in her hips and knees.  She is feeling more stiff, and \"older.\"  + hot flashes.      She is feeling well.  No pain.  No f/c.  No chest pain or shortness of breath.  No n/v/d/c.      No new medical problems.      ROS: 10 point ROS neg other than the symptoms noted above in the HPI.      Past Medical History:   Diagnosis Date    Allergic rhinitis     Chronic cough     GERD (gastroesophageal reflux disease)     Hypothyroidism      Past Surgical History:   Procedure Laterality Date    CHOLECYSTECTOMY      L salpingooophorectomy  2015    LUMPECTOMY BREAST WITH SENTINEL NODE, COMBINED Right 10/24/2018    Procedure: Right Wire Localized Segmental Mastectomy (Lumpectomy), Right [place] Lymph Node Biopsy;  Surgeon: Kalina Oviedo MD;  Location: [facility] OR         OBJECTIVE     Physical Exam  /79 (BP Location: Right arm, Patient Position: Sitting, Cuff Size: Adult Regular)   Pulse 69   Temp 98.6  F (37  C)   Resp 16   Wt 76.8 kg (169 lb 4.8 oz)   SpO2 99%   BMI 29.08 kg/m     GENERAL:  Alert, no acute distress  HEAD/NECK:  PERRL, EOMI, no scleral icterus  LYMPH NODES: No palpable cervical, axillary, abdominal or inguinal lymph " nodes  BREAST: Well healed right segmental mastectomy scar. No palpable lumps on b/l breast exam.   LUNGS:  Clear to auscultation b/l, no wheezes or crackles  CARDIOVASCULAR:  Regular rate and rhythm, normal S1, S2 without rub, gallop or murmur  EXTREMITIES:  No lower extremity edema.   NEURO: CN 3-12 grossly intact, equal motor strength in major muscle groups of all four extremities  SKIN: No rashes  PSYCH: Normal affect     Labs/Imaging:      I reviewed her imaging with radiology personally, benign    We reviewed her dexa scan (final report pending); stable osteopenia in hips.  4% decrease in lumbar spine.  Osteoporosis.    ASSESSMENT/PLAN:  Gabriella Villarreal is a 59 year old woman with recently diagnosed Stage IA invasive mammary carcinoma involving the right breast, ER+/KS+ and Her2 non-amplified (pT1c(2)N0). She underwent wire-localized right-sided segmental mastectomy with Dr. Kalina Oviedo on 10/24/18.    She was on Tamoxifen for adjuvant endocrine therapy since (2/4/19), and then switched to an AI Jan 2022.     #  Breast cancer - ER+/KS+ and Her2 non-amplified (pT1c(2)N0). OncoTypeDX score is 13, indicating low-risk of distant recurrence and no additional benefit of chemotherapy.  We reviewed her mammogram demonstrating no abnormalities.  We discussed the pros and cons of remaining on endocrine therapy, as well as the concerns regarding her osteoporosis.  Ultimately, we discussed obtaining breast cancer index to help determine whether prolonged endocrine therapy would be beneficial.       I will call her with these results.      Return in 6 months.     # Bone Health - we reviewed her dexa as above.  Given her osteoporosis, if she remains on letrozole, I advised adding zometa q 6 months x 3 years.  She does not want to take any medications for her bones.  She is working out avidly in the gym and is lifting weights three times per week.          Karen Berrios MD     The longitudinal plan of care for the  diagnosis(es)/condition(s) as documented were addressed during this visit. Due to the added complexity in care, I will continue to support Lauren in the subsequent management and with ongoing continuity of care.

## 2024-10-30 ENCOUNTER — PATIENT OUTREACH (OUTPATIENT)
Dept: ONCOLOGY | Facility: CLINIC | Age: 59
End: 2024-10-30
Payer: COMMERCIAL

## 2024-10-30 NOTE — PROGRESS NOTES
"[facility]: Cancer Care                                                                                          BCI submitted at Dr Berrios request. Fax [phone] \"ok\" right fax    Yumiko Cruz MSN, RN ,OCN  Lead RN Care Coordinator - [facility]  [phone]    "

## 2025-05-24 NOTE — LETTER
11/21/2018       RE: Gabriella Villarreal  [address] [facility] [address]     Dear Colleague,    Thank you for referring your patient, Gabriella Villarreal, to the [facility] DERMATOLOGY at [facility]. Please see a copy of my visit note below.    [facility] Dermatology Note      Dermatology Problem List:  1. Allergic contact dermatitis    Encounter Date: Nov 21, 2018    CC:  Chief Complaint   Patient presents with     Allergies     Lauren is here for patch tetsing day 3         History of Present Illness:  Ms. Gabriella Villarreal is a 53 year old female who presents as a follow-up for rash on the right breast. The patient was last seen 10/31. She underwent lumpectomy with sentinel node removal on 10/24, and developed a vesicular maculopapular rash between the two incision sites on 10/26. The rash spread to her neck, torso, and right arm, it was warm to the touch, and pruritic. She was seen by surgical oncology and her wounds were not concerning for infection. She does have a history of atopy. At her last visit on 10/31 (see photo in Media), she was started on Gentian violet BID, triamcinolone 0.1% cream BID, and keflex per surgical oncology. She has been unable to get the gentian, but has been using the triamcinolone on her entire rash and taking keflex as prescribed. She also has been switching off between benadryl and zyrtec to control the pruritis.    Today she returns to clinic with much improvement in the erythema and swelling. She is concerned that the rash is continuing to spread very slowly down her right arm, around toward her back, across her chest, and down her abdomen. The rash is also persistently pruritic. However, she believes it has mostly been improving. She has not experienced fevers, chills, sweats, or increased pain at her incision sites. There has been no more drainage from the incision sites, nor has her rash been weeping or  4 Eyes Skin Assessment Completed by MIREYA, RN and RAMON RN.    Skin assessment is primarily focused on high risk bony prominences. Pay special attention to skin beneath and around medical devices, high risk bony prominences, skin to skin areas and areas where the patient lacks sensation to feel pain and areas where the patient previously had breakdown.     Head (Occipital):  WDL   Ears (Under Medical Devices): WDL   Nose (Under Medical Devices): WDL   Mouth:  WDL   Neck: WDL   Breast/Chest:  Scab   Shoulder Blades:  WDL   Spine:   WDL   (R) Arm/Elbow/Hand: WDL   (L) Arm/Elbow/Hand: Scab fistula    Abdomen: Scab   Pannus/Groin:  WDL   Sacrum/Coccyx:      (R) Ischial Tuberosity (Sit Bones):  WDL   (L) Ischial Tuberosity (Sit Bones):  WDL   (R) Leg:  Scab   (L) Leg:  Scab   (R) Heel:  WDL   (R) Foot/Toe: WDL   (L) Heel: WDL   (L) Foot/Toe:  WDL                         DEVICES IN USE:   Respiratory Devices:  NA, patient on room air  Feeding Devices:  N/A   Lines & BP Monitoring Devices:  Central line    Orthopedic Devices:  N/A  Miscellaneous Devices:  N/A    PROTOCOL INTERVENTIONS:   Offloading Dressing - Sacrum:  Already in place  Offloading Dressing - Heel:  Already in place  Float Heels with Pillows:  Already in place  Barrier Paste:  Already in place  DEBO: Already in place  WOUND PHOTOS:   Completed and in EPIC     WOUND CONSULT:   N/A, no advanced wound care needs identified   bleeding.    Of note, she had a small operation during which they used surgical glue in 2015. She did not react at that time.    Past Medical History:   Patient Active Problem List   Diagnosis     Malignant neoplasm of upper-outer quadrant of right breast in female, estrogen receptor positive (H)     Past Medical History:   Diagnosis Date     Allergic rhinitis      Chronic cough      GERD (gastroesophageal reflux disease)      Hypothyroidism      Past Surgical History:   Procedure Laterality Date     CHOLECYSTECTOMY       L salpingooophorectomy  2015     LUMPECTOMY BREAST WITH SENTINEL NODE, COMBINED Right 10/24/2018    Procedure: Right Wire Localized Segmental Mastectomy (Lumpectomy), Right [place] Lymph Node Biopsy;  Surgeon: Kalina Oviedo MD;  Location:  OR       Social History:  Patient  reports that she has never smoked. She has never used smokeless tobacco. She reports that she drinks alcohol. She reports that she does not use illicit drugs.    Family History:  Family History   Problem Relation Age of Onset     Myocardial Infarction Mother      Coronary Artery Disease Mother      Arrhythmia Father      Prostate Cancer Father      Alcoholism Father      Alcoholism Sister      Diabetes Maternal Grandmother      No Known Problems Maternal Grandfather      No Known Problems Paternal Grandmother      HEART DISEASE Paternal Grandfather      Skin Cancer Other      Ovarian Cancer Maternal Aunt      Ovarian Cancer Other      Melanoma No family hx of        Medications:  Current Outpatient Prescriptions   Medication Sig Dispense Refill     ACETAMINOPHEN PO Take 1,000 mg by mouth       albuterol (PROAIR HFA/PROVENTIL HFA/VENTOLIN HFA) 108 (90 Base) MCG/ACT inhaler Inhale 2-4 puffs into the lungs       cephALEXin (KEFLEX) 500 MG capsule Take 1 capsule (500 mg) by mouth 4 times daily (Patient not taking: Reported on 11/19/2018) 28 capsule 0     chlorpheniramine (CHLOR-TRIMETON) 4 MG tablet Take 4 mg by mouth         diphenhydrAMINE (BENADRYL) 25 MG tablet        emollient (VANICREAM) cream Apply topically as needed for other (for dry skin) 57 g 0     fluticasone (FLONASE) 50 MCG/ACT spray INSTILL 2 SPRAYS INTO BOTH NOSTRILS DAILY.       gentian violet 1 % solution Apply 0.5 mLs topically 3 times daily (Patient not taking: Reported on 11/6/2018) 59 mL 1     gentian violet 2 % solution Apply 1 mL topically 2 times daily For affected area 1 - 2 times a day. Apply to area before applying triamcinolone cream (Patient not taking: Reported on 11/6/2018) 59 mL 3     ibuprofen (ADVIL/MOTRIN) 200 MG tablet Take 200 mg by mouth       levothyroxine (SYNTHROID/LEVOTHROID) 100 MCG tablet Take 100 mcg by mouth every morning        oxyCODONE IR (ROXICODONE) 5 MG tablet Take 1-2 tablets (5-10 mg) by mouth every 4 hours as needed for moderate to severe pain (Patient not taking: Reported on 11/6/2018) 6 tablet 0     ranitidine (ZANTAC) 150 MG tablet Take 150 mg by mouth 2 times daily  60 tablet 1     senna-docusate (SENOKOT-S;PERICOLACE) 8.6-50 MG per tablet Take 1-2 tablets by mouth 2 times daily (Patient not taking: Reported on 11/6/2018) 30 tablet 0     triamcinolone (KENALOG) 0.1 % cream Apply topically 2 times daily 80 g 1     Allergies   Allergen Reactions     Seasonal Allergies          Review of Systems:  -As per HPI  -Constitutional: The patient denies fatigue, fevers, chills, unintended weight loss, and night sweats.  -HEENT: Patient denies nonhealing oral sores.  -Skin: As above in HPI. No additional skin concerns.    Physical exam:  Vitals: LMP 10/26/2018 (Exact Date)  GEN: This is a well developed, well-nourished female in no acute distress, in a pleasant mood.    SKIN: Waist-up skin, which includes the head/face, neck, both arms, chest, back, abdomen, digits and/or nails was examined.  - Maculopapular rash involving right breast, spreading to neck, right arm immediately distal to antecubital fossa, down to naval, and around to  the right side.  - in the moment no active eczemtous lesions anymore, no oozing and no blisters. Some re-activation upper arm and axillar area and starting elbow flexural area    Impression/Plan:  1. Allergic Contact dermatitis    Start Gentian violet BID when able to get it    Continue triamcinolone 0.1% BID for 3-4 more days    Plan for allergy patch test when symptoms resolve, orders as below    Continue Keflex as prescribed by surgical oncology     Use vanicream on skin after rash resolves    Okay to take both benadryl and zyrtec     Order for PATCH TESTS     [x] Outpatient                                              [] Inpatient: Ibrahim..../ Bed ....                                        Skin Atopy (atopic dermatitis)                  [] Yes   [] No  Rhinitis/Sinusitis:                                                            [x] Yes   [] No --> seasonal allergies (chronic coughing any time in the day ==> DDx Reflux)  Allergic Asthma:                                                              [] Yes   [x] No  Food Allergy:                                                                   [] Yes   [x] No  Leg ulcers:                                                                       [] Yes   [x] No  Hand eczema:                                                                  [] Yes   [x] No                                           Leading hand:   [] R   [] L       [] Ambidextrous                         Reason for tests (suspected allergy): adhesives (acrylates) or disinfectants (Chlorhexidine wipes)  Known previous allergies: none     Standardized panels  [x] Standard panel (40 tests)  [x] Preservatives & Antimicrobials (31 tests)  [] Emulsifiers & Additives (25 tests)   [] Perfumes/Flavours & Plants (25 tests)  [] Hairdresser panel (12 tests)  [] Rubber Chemicals (22 tests)  [x] Plastics (26 tests)  [] Colorants/Dyes/Food additives (20 tests)  [] Metals (implants/dental) (23 tests)  [] Local  anaesthetics/NSAIDs (12 tests)  [x] Antibiotics & Antimycotics (14 tests)   [] Corticosteroids (15 tests)   [] Photopatch test (32 tests)   [] others: ...       RESULTS & EVALUATION of PATCH TESTS    Patch test readings after     [x] 2 days, [] 3 days [x] 4 days, [] 5 days,    Applied patch tests with results (import here the list of patch tests):  Date/time of application:11/19/18  Physician/Nurse:  / Candace Gupta LPN               Localization of application: Back, in the moment without eczematous lesions    STANDARD Series         No Substance 2 days 4 days remarks   1 Clement Mix [C] - -    2 Colophony - -    3  2-Mercaptobenzothiazole  - -     4 Methylisothiazolinone - -    5 Carba Mix - -    6 Thiuram Mix [A] - -    7 Bisphenol A Epoxy Resin - -    8 G-Nftq-Cjoshukaymq-Formaldehyde Resin - -    9 Mercapto Mix [A] - -    10 Black Rubber Mix- PPD [B] - -    11 Potassium Dichromate  -  -    12 Balsam of [place] (Myroxylon Pereirae Resin) - -    13 Nickel Sulphate Hexahydrate - -    14 Mixed Dialkyl Thiourea - -    15 Paraben Mix [B] - -    16 Methyldibromo Glutaronitrile - -    17 Fragrance Mix - -    18 2-Bromo-2-Nitropropane-1,3-Diol (Bronopol) - -    19 Lyral - -    20 Tixocortol-21- Pivalate - -    21 Diazolidiyl Urea (Germall II) - -    22 Methyl Methacrylate - -    23 Cobalt (II) Chloride Hexahydrate - -    24 Fragrance Mix II  - -    25 Compositae Mix - -    26 Benzoyl Peroxide (+) -    27 Bacitracin - -    28 Formaldehyde - -    29 Methylchloroisothiazolinone / Methylisothiazolinone - -    30 Corticosteroid Mix - -    31 Sodium Lauryl Sulfate - -    32 Lanolin Alcohol - -    33 Turpentine - -    34 Cetylstearylalcohol - -    35 Chlorhexidine Dicluconate - -    36 Budenoside - -    37 Imidazolidinyl Urea  - -    38 Ethyl-2 Cyanoacrylate - -    39 Quaternium 15 (Dowicil 200) - -    40 Decyl Glucoside - -      PLASTICS         No Substance 2 days 4 days remarks    Acrylates - -    41  2-Hydroxyethyl Methacrylate (HEMA) - -    42 1,4-Butandioldimethacrylate (BUDMA) - -    43  2-Ethylhexyl Acrylate - -    44 Bisphenol-A-Dimethacrylate  - -    45 Diurethane-Dimethacrylate - -    46 Ethyleneglycoldimethacrylate (EGDMA) - -    47 Pentaerythritoltriacrylate (MIRANDA) - -    48 Triethylene Glycol Dimethacrylate (TEGDMA) - -     Synthetic material/additives       49 Y-Ynnv-Xydpiarlvck - -    50 Tricresyl Phosphate - -    51 2-Pdlu-Ebtliswtnqinv - -    52 Bis (2-Ethylhexyl) Phthalate - -    53 Dibutylphthalate - -    54 Dimethylphthalate - -    55 Toluene-2,4-Diisocyanate - -    56 Diphenylmethane-4,4''-Diisocyanate - -     EPOXY RESIN SYSTEMS       Reactive Solvents - -    57 Cresyl Glycidyl Ether - -    58 Butyl Glycidyl Ether - -    59 Phenyl Glycidyl Ether - -    60 1,4-Butanediol Diglycidyl Ether - -    61 1,6-Hexanediole Diglycidyl Ether - -     Hardener / Accelerator - -    62 Ethylenediamine Dihydrochloride - -    63 Triethylenetetramine - -    64 Diethylenetriamine - -    65 Isophorone Diamine (IPD) - -    66 N,N-Dimethyl-P-Toluidine - -      ANTIBIOTICS & ANTIMYCOTICS         No Substance 2 days 4 days remarks   67 Erythromycine - -    68 Framycetine Sulphate - -    69 Fusidic Acid Sodium Salt - -    70 Gentamicin Sulphate - -    71 Neomycine Sulphate - -    72 Oxytetracycline  - -    73 Polymyxin B Sulphate - -    74 Tetracycline-HCL - -    75 Sulfanilamide - -    76 Metronidazole - -    77 Oxyquinoline Mix (+) -    78 Nitrofurazone - -    79 Nystatin - -    80 Clotrimazole - -      PRESERVATIVES & ANTIMICROBIALS         No Substance 2 days 4 days remarks   81  1,2-Benzisothiazoline-3-One, Sodium Salt - -    82  1,3,5-Osmin (2-Hydroxyethyl) - Hexahydrotriazine (Grotan BK) - -    83 7-Vnxutgnvzzrzw-8-Nitro-1, 3-Propanediol - -    84  3, 4, 4' - Triclocarban - -    85 4 - Chloro - 3 - Cresol - -    86 4 - Chloro - 4 - Xylenol (PCMX) - -    87 7-Ethylbicyclooxazolidine ([facility]) - -    88  Benzalkonium Chloride - -    89 Benzyl Alcohol - -    90 Cetalkonium Chloride - -    91 Cetylpyrimidine Chloride  - -    92 Chloroacetamide - -    93 DMDM Hydantoin - -    94 Glutaraldehyde - -    95 Triclosan - -    96 Glyoxal Trimeric Dihydrate - -    97 Iodopropynyl Butylcarbamate - -    98 Octylisothiazoline ++ -    99 Iodoform - -    100 (Nitrobutyl) Morpholine/(Ethylnitro-Trimethylene) Dimorpholine (Bioban P 1487) - -    101 Phenoxyethanol - -    102 Phenyl Salicylate - -    103 Povidone Iodine - -    104 Sodium Benzoate - -    105 Sodium Disulfite - -    106 Sorbic Acid - -    107 Thimerosal - -     Parabens      108 Butyl-P-Hydroxybenzoate - -    109 Ethyl-P-Hydroxybenzoate - -    110 Methyl-P-Hydroxybenzoate - -    111 Propyl-P-Hydroxybenzoate - -      Results of patch tests:                         Interpretation:    - Negative                    A    = Allergic      (+) Erythema    TI   = Toxic/irritant   + E + Infiltration    RaP = Relevance at Present     ++ E/I + Papulovesicle   Rpr  = Relevance Previously     +++ E/I/P + Blister     nR   = No Relevance    [] No relevant allergic reaction observed    [x] Allergic reaction diagnosed against: Octylisothiazolinone      Interpretation/ remarks:   Octylisothiazolinone = Preservative in paints, inks, textiles and can be found in acrylic sealants (adhesives?), paints/lacquers    [] Patient information given   [] ACSD information   [x] SmartPractice information: Octylisothiazolinone    ==> final Diagnosis:  See later    ==> Treatment prescribed/Plan:  See later      These conclusionsare made at the best of ones knowledge and belief  based on the provided evidence such as patients history and allergy test results and they can change over time or can be incomplete because of missing informations.      Pictures were placed in Pt's chart today for future reference.                            Again, thank you for allowing me to participate in the care of your  patient.      Sincerely,    Mj Simon MD

## 2025-07-19 ENCOUNTER — HEALTH MAINTENANCE LETTER (OUTPATIENT)
Age: 60
End: 2025-07-19

## 2025-07-22 DIAGNOSIS — C50.411 MALIGNANT NEOPLASM OF UPPER-OUTER QUADRANT OF RIGHT BREAST IN FEMALE, ESTROGEN RECEPTOR POSITIVE (H): ICD-10-CM

## 2025-07-22 DIAGNOSIS — Z17.0 MALIGNANT NEOPLASM OF UPPER-OUTER QUADRANT OF RIGHT BREAST IN FEMALE, ESTROGEN RECEPTOR POSITIVE (H): ICD-10-CM

## 2025-07-22 RX ORDER — LETROZOLE 2.5 MG/1
1 TABLET, FILM COATED ORAL DAILY
Qty: 90 TABLET | Refills: 3 | Status: SHIPPED | OUTPATIENT
Start: 2025-07-22

## 2025-07-22 NOTE — TELEPHONE ENCOUNTER
Pending Prescriptions:                       Disp   Refills    letrozole (FEMARA) 2.5 MG tablet [Pharmac*90 tab*3            Sig: TAKE 1 TABLET DAILY    Last prescribing provider:     Last clinic visit date: 10/28/24 w/    Recommendations for requested medication (if none, N/A): N/A    Any other pertinent information (if none, N/A): N/A    Refilled: Y/N, if NO, why?

## (undated) DEVICE — SU DERMABOND PRINEO 22CM CLR222US

## (undated) DEVICE — SU MONOCRYL 4-0 PS-2 18" UND Y496G

## (undated) DEVICE — CLIP HORIZON SM RED WIDE SLOT 001201

## (undated) DEVICE — MARKER MARGIN MARKER STD 6 COLOR SGL USE MMS6

## (undated) DEVICE — GLOVE PROTEXIS BLUE W/NEU-THERA 6.0  2D73EB60

## (undated) DEVICE — GLOVE PROTEXIS MICRO 5.5  2D73PM55

## (undated) DEVICE — NDL 30GA 0.5" 305106

## (undated) DEVICE — CLIP HORIZON MED BLUE 002200

## (undated) DEVICE — SU VICRYL 3-0 SH 27" UND J416H

## (undated) DEVICE — ESU ELEC BLADE 2.75" COATED/INSULATED E1455

## (undated) DEVICE — LINEN TOWEL PACK X5 5464

## (undated) DEVICE — DRSG STERI STRIP 1/2X4" R1547

## (undated) DEVICE — GOWN LG DISP 9515

## (undated) DEVICE — SUCTION MANIFOLD NEPTUNE 2 SYS 1 PORT 702-025-000

## (undated) DEVICE — SPONGE RAY-TEC 4X8" 7318

## (undated) DEVICE — DRAPE IOBAN INCISE 23X17" 6650EZ

## (undated) DEVICE — ESU GROUND PAD ADULT W/CORD E7507

## (undated) DEVICE — SOL NACL 0.9% IRRIG 500ML BOTTLE 2F7123

## (undated) DEVICE — PREP CHLORAPREP 26ML TINTED ORANGE  260815

## (undated) DEVICE — DRSG TELFA 3X8" 1238

## (undated) DEVICE — BASIN EMESIS STERILE  SSK9005A

## (undated) DEVICE — DRAPE SHEET MED 44X70" 9355

## (undated) DEVICE — DRAPE U SPLIT 74X120" 29440

## (undated) DEVICE — SU MONOCRYL 4-0 P-3 18" UND Y494G

## (undated) DEVICE — STRAP KNEE/BODY 31143004

## (undated) DEVICE — PACK MINOR CUSTOM ASC

## (undated) DEVICE — ESU PENCIL SMOKE EVAC W/ROCKER SWITCH 0703-047-000

## (undated) DEVICE — DRSG PRIMAPORE 03 1/8X6" 66000318

## (undated) RX ORDER — GLYCOPYRROLATE 0.2 MG/ML
INJECTION INTRAMUSCULAR; INTRAVENOUS
Status: DISPENSED
Start: 2018-10-24

## (undated) RX ORDER — LIDOCAINE HYDROCHLORIDE AND EPINEPHRINE 10; 10 MG/ML; UG/ML
INJECTION, SOLUTION INFILTRATION; PERINEURAL
Status: DISPENSED
Start: 2018-10-24

## (undated) RX ORDER — FENTANYL CITRATE 50 UG/ML
INJECTION, SOLUTION INTRAMUSCULAR; INTRAVENOUS
Status: DISPENSED
Start: 2018-10-24

## (undated) RX ORDER — ISOSULFAN BLUE 50 MG/5ML
INJECTION, SOLUTION SUBCUTANEOUS
Status: DISPENSED
Start: 2018-10-24

## (undated) RX ORDER — PROPOFOL 10 MG/ML
INJECTION, EMULSION INTRAVENOUS
Status: DISPENSED
Start: 2018-10-24

## (undated) RX ORDER — CEFAZOLIN SODIUM 1 G/50ML
SOLUTION INTRAVENOUS
Status: DISPENSED
Start: 2018-10-24

## (undated) RX ORDER — DEXAMETHASONE SODIUM PHOSPHATE 4 MG/ML
INJECTION, SOLUTION INTRA-ARTICULAR; INTRALESIONAL; INTRAMUSCULAR; INTRAVENOUS; SOFT TISSUE
Status: DISPENSED
Start: 2018-10-24

## (undated) RX ORDER — OXYCODONE HYDROCHLORIDE 5 MG/1
TABLET ORAL
Status: DISPENSED
Start: 2018-10-24

## (undated) RX ORDER — GABAPENTIN 300 MG/1
CAPSULE ORAL
Status: DISPENSED
Start: 2018-10-24

## (undated) RX ORDER — ONDANSETRON 2 MG/ML
INJECTION INTRAMUSCULAR; INTRAVENOUS
Status: DISPENSED
Start: 2018-10-24

## (undated) RX ORDER — BUPIVACAINE HYDROCHLORIDE 2.5 MG/ML
INJECTION, SOLUTION EPIDURAL; INFILTRATION; INTRACAUDAL
Status: DISPENSED
Start: 2018-10-24

## (undated) RX ORDER — ACETAMINOPHEN 325 MG/1
TABLET ORAL
Status: DISPENSED
Start: 2018-10-24